# Patient Record
Sex: MALE | Race: WHITE | NOT HISPANIC OR LATINO | Employment: UNEMPLOYED | URBAN - METROPOLITAN AREA
[De-identification: names, ages, dates, MRNs, and addresses within clinical notes are randomized per-mention and may not be internally consistent; named-entity substitution may affect disease eponyms.]

---

## 2023-12-28 ENCOUNTER — HOSPITAL ENCOUNTER (INPATIENT)
Age: 73
Discharge: HOME | DRG: 384 | End: 2023-12-28
Attending: STUDENT IN AN ORGANIZED HEALTH CARE EDUCATION/TRAINING PROGRAM | Admitting: FAMILY MEDICINE

## 2023-12-28 DIAGNOSIS — K25.3 ACUTE GASTRIC ULCER, UNSPECIFIED WHETHER GASTRIC ULCER HEMORRHAGE OR PERFORATION PRESENT: Primary | ICD-10-CM

## 2023-12-28 DIAGNOSIS — N17.9 ACUTE KIDNEY INJURY (CMD): ICD-10-CM

## 2023-12-28 DIAGNOSIS — R33.8 ACUTE URINARY RETENTION: ICD-10-CM

## 2023-12-28 DIAGNOSIS — D64.9 ANEMIA, UNSPECIFIED TYPE: ICD-10-CM

## 2023-12-28 PROCEDURE — 96375 TX/PRO/DX INJ NEW DRUG ADDON: CPT

## 2023-12-28 PROCEDURE — 10002800 HB RX 250 W HCPCS: Performed by: STUDENT IN AN ORGANIZED HEALTH CARE EDUCATION/TRAINING PROGRAM

## 2023-12-28 PROCEDURE — 82272 OCCULT BLD FECES 1-3 TESTS: CPT

## 2023-12-28 PROCEDURE — 86850 RBC ANTIBODY SCREEN: CPT | Performed by: STUDENT IN AN ORGANIZED HEALTH CARE EDUCATION/TRAINING PROGRAM

## 2023-12-28 PROCEDURE — 10002807 HB RX 258: Performed by: STUDENT IN AN ORGANIZED HEALTH CARE EDUCATION/TRAINING PROGRAM

## 2023-12-28 PROCEDURE — 83690 ASSAY OF LIPASE: CPT | Performed by: STUDENT IN AN ORGANIZED HEALTH CARE EDUCATION/TRAINING PROGRAM

## 2023-12-28 PROCEDURE — 85025 COMPLETE CBC W/AUTO DIFF WBC: CPT | Performed by: STUDENT IN AN ORGANIZED HEALTH CARE EDUCATION/TRAINING PROGRAM

## 2023-12-28 PROCEDURE — 99285 EMERGENCY DEPT VISIT HI MDM: CPT

## 2023-12-28 PROCEDURE — 96374 THER/PROPH/DIAG INJ IV PUSH: CPT

## 2023-12-28 PROCEDURE — 80053 COMPREHEN METABOLIC PANEL: CPT | Performed by: STUDENT IN AN ORGANIZED HEALTH CARE EDUCATION/TRAINING PROGRAM

## 2023-12-28 PROCEDURE — 96361 HYDRATE IV INFUSION ADD-ON: CPT

## 2023-12-28 RX ORDER — ONDANSETRON 2 MG/ML
4 INJECTION INTRAMUSCULAR; INTRAVENOUS ONCE
Status: COMPLETED | OUTPATIENT
Start: 2023-12-29 | End: 2023-12-29

## 2023-12-28 RX ADMIN — ONDANSETRON 4 MG: 2 INJECTION INTRAMUSCULAR; INTRAVENOUS at 23:55

## 2023-12-28 RX ADMIN — SODIUM CHLORIDE 1000 ML: 9 INJECTION, SOLUTION INTRAVENOUS at 23:55

## 2023-12-29 ENCOUNTER — APPOINTMENT (OUTPATIENT)
Dept: CARDIOLOGY | Age: 73
DRG: 384 | End: 2023-12-29
Attending: INTERNAL MEDICINE

## 2023-12-29 ENCOUNTER — APPOINTMENT (OUTPATIENT)
Dept: GENERAL RADIOLOGY | Age: 73
DRG: 384 | End: 2023-12-29
Attending: SURGERY

## 2023-12-29 ENCOUNTER — APPOINTMENT (OUTPATIENT)
Dept: CT IMAGING | Age: 73
DRG: 384 | End: 2023-12-29
Attending: STUDENT IN AN ORGANIZED HEALTH CARE EDUCATION/TRAINING PROGRAM

## 2023-12-29 PROBLEM — K25.3 ACUTE GASTRIC ULCER, UNSPECIFIED WHETHER GASTRIC ULCER HEMORRHAGE OR PERFORATION PRESENT: Status: ACTIVE | Noted: 2023-12-29

## 2023-12-29 PROBLEM — K56.609 SBO (SMALL BOWEL OBSTRUCTION) (CMD): Status: ACTIVE | Noted: 2023-12-29

## 2023-12-29 LAB
ABO + RH BLD: NORMAL
ALBUMIN SERPL-MCNC: 2.8 G/DL (ref 3.6–5.1)
ALBUMIN/GLOB SERPL: 0.6 {RATIO} (ref 1–2.4)
ALP SERPL-CCNC: 115 UNITS/L (ref 45–117)
ALT SERPL-CCNC: 26 UNITS/L
ANION GAP SERPL CALC-SCNC: 10 MMOL/L (ref 7–19)
ANION GAP SERPL CALC-SCNC: 11 MMOL/L (ref 7–19)
APPEARANCE UR: CLEAR
AST SERPL-CCNC: 19 UNITS/L
ATRIAL RATE (BPM): 254
BACTERIA #/AREA URNS HPF: ABNORMAL /HPF
BASOPHILS # BLD: 0 K/MCL (ref 0–0.3)
BASOPHILS # BLD: 0.1 K/MCL (ref 0–0.3)
BASOPHILS NFR BLD: 0 %
BASOPHILS NFR BLD: 0 %
BILIRUB SERPL-MCNC: 0.3 MG/DL (ref 0.2–1)
BILIRUB UR QL STRIP: NEGATIVE
BLD GP AB SCN SERPL QL GEL: NEGATIVE
BUN SERPL-MCNC: 40 MG/DL (ref 6–20)
BUN SERPL-MCNC: 44 MG/DL (ref 6–20)
BUN/CREAT SERPL: 26 (ref 7–25)
BUN/CREAT SERPL: 26 (ref 7–25)
CALCIUM SERPL-MCNC: 8.7 MG/DL (ref 8.4–10.2)
CALCIUM SERPL-MCNC: 8.9 MG/DL (ref 8.4–10.2)
CHLORIDE SERPL-SCNC: 112 MMOL/L (ref 97–110)
CHLORIDE SERPL-SCNC: 115 MMOL/L (ref 97–110)
CO2 SERPL-SCNC: 22 MMOL/L (ref 21–32)
CO2 SERPL-SCNC: 23 MMOL/L (ref 21–32)
COLOR UR: ABNORMAL
CREAT SERPL-MCNC: 1.52 MG/DL (ref 0.67–1.17)
CREAT SERPL-MCNC: 1.67 MG/DL (ref 0.67–1.17)
DEPRECATED RDW RBC: 40.8 FL (ref 39–50)
DEPRECATED RDW RBC: 41 FL (ref 39–50)
EGFRCR SERPLBLD CKD-EPI 2021: 43 ML/MIN/{1.73_M2}
EGFRCR SERPLBLD CKD-EPI 2021: 48 ML/MIN/{1.73_M2}
EOSINOPHIL # BLD: 0.2 K/MCL (ref 0–0.5)
EOSINOPHIL # BLD: 0.3 K/MCL (ref 0–0.5)
EOSINOPHIL NFR BLD: 2 %
EOSINOPHIL NFR BLD: 2 %
ERYTHROCYTE [DISTWIDTH] IN BLOOD: 13.6 % (ref 11–15)
ERYTHROCYTE [DISTWIDTH] IN BLOOD: 13.7 % (ref 11–15)
FASTING DURATION TIME PATIENT: ABNORMAL H
FASTING DURATION TIME PATIENT: ABNORMAL H
GLOBULIN SER-MCNC: 4.9 G/DL (ref 2–4)
GLUCOSE BLDC GLUCOMTR-MCNC: 76 MG/DL (ref 70–99)
GLUCOSE SERPL-MCNC: 110 MG/DL (ref 70–99)
GLUCOSE SERPL-MCNC: 112 MG/DL (ref 70–99)
GLUCOSE UR STRIP-MCNC: NEGATIVE MG/DL
HCT VFR BLD CALC: 25.1 % (ref 39–51)
HCT VFR BLD CALC: 25.7 % (ref 39–51)
HGB BLD-MCNC: 7.7 G/DL (ref 13–17)
HGB BLD-MCNC: 7.9 G/DL (ref 13–17)
HGB UR QL STRIP: NEGATIVE
HYALINE CASTS #/AREA URNS LPF: ABNORMAL /LPF
IMM GRANULOCYTES # BLD AUTO: 0 K/MCL (ref 0–0.2)
IMM GRANULOCYTES # BLD AUTO: 0 K/MCL (ref 0–0.2)
IMM GRANULOCYTES # BLD: 0 %
IMM GRANULOCYTES # BLD: 0 %
KETONES UR STRIP-MCNC: NEGATIVE MG/DL
LACTATE BLDV-SCNC: 0.7 MMOL/L (ref 0–2)
LEUKOCYTE ESTERASE UR QL STRIP: ABNORMAL
LIPASE SERPL-CCNC: 110 UNITS/L (ref 15–77)
LYMPHOCYTES # BLD: 1.3 K/MCL (ref 1–4)
LYMPHOCYTES # BLD: 1.3 K/MCL (ref 1–4)
LYMPHOCYTES NFR BLD: 10 %
LYMPHOCYTES NFR BLD: 13 %
MAGNESIUM SERPL-MCNC: 2.1 MG/DL (ref 1.7–2.4)
MCH RBC QN AUTO: 25 PG (ref 26–34)
MCH RBC QN AUTO: 25.5 PG (ref 26–34)
MCHC RBC AUTO-ENTMCNC: 30.7 G/DL (ref 32–36.5)
MCHC RBC AUTO-ENTMCNC: 30.7 G/DL (ref 32–36.5)
MCV RBC AUTO: 81.5 FL (ref 78–100)
MCV RBC AUTO: 82.9 FL (ref 78–100)
MONOCYTES # BLD: 0.8 K/MCL (ref 0.3–0.9)
MONOCYTES # BLD: 0.9 K/MCL (ref 0.3–0.9)
MONOCYTES NFR BLD: 7 %
MONOCYTES NFR BLD: 8 %
MUCOUS THREADS URNS QL MICRO: PRESENT
NEUTROPHILS # BLD: 10.5 K/MCL (ref 1.8–7.7)
NEUTROPHILS # BLD: 7.5 K/MCL (ref 1.8–7.7)
NEUTROPHILS NFR BLD: 77 %
NEUTROPHILS NFR BLD: 81 %
NITRITE UR QL STRIP: NEGATIVE
NRBC BLD MANUAL-RTO: 0 /100 WBC
NRBC BLD MANUAL-RTO: 0 /100 WBC
PH UR STRIP: 5 [PH] (ref 5–7)
PHOSPHATE SERPL-MCNC: 3.9 MG/DL (ref 2.4–4.7)
PLATELET # BLD AUTO: 360 K/MCL (ref 140–450)
PLATELET # BLD AUTO: 425 K/MCL (ref 140–450)
POTASSIUM SERPL-SCNC: 4.4 MMOL/L (ref 3.4–5.1)
POTASSIUM SERPL-SCNC: 4.6 MMOL/L (ref 3.4–5.1)
PROT SERPL-MCNC: 7.7 G/DL (ref 6.4–8.2)
PROT UR STRIP-MCNC: NEGATIVE MG/DL
QRS-INTERVAL (MSEC): 104
QT-INTERVAL (MSEC): 316
QTC: 459
R AXIS (DEGREES): 86
RAINBOW EXTRA TUBES HOLD SPECIMEN: NORMAL
RBC # BLD: 3.08 MIL/MCL (ref 4.5–5.9)
RBC # BLD: 3.1 MIL/MCL (ref 4.5–5.9)
RBC #/AREA URNS HPF: ABNORMAL /HPF
REPORT TEXT: NORMAL
SODIUM SERPL-SCNC: 140 MMOL/L (ref 135–145)
SODIUM SERPL-SCNC: 144 MMOL/L (ref 135–145)
SP GR UR STRIP: 1.01 (ref 1–1.03)
SQUAMOUS #/AREA URNS HPF: ABNORMAL /HPF
T AXIS (DEGREES): -31
TRIGL SERPL-MCNC: 69 MG/DL
TYPE AND SCREEN EXPIRATION DATE: NORMAL
UROBILINOGEN UR STRIP-MCNC: 0.2 MG/DL
VENTRICULAR RATE EKG/MIN (BPM): 127
WBC # BLD: 13.1 K/MCL (ref 4.2–11)
WBC # BLD: 9.9 K/MCL (ref 4.2–11)
WBC #/AREA URNS HPF: ABNORMAL /HPF

## 2023-12-29 PROCEDURE — 10002801 HB RX 250 W/O HCPCS: Performed by: STUDENT IN AN ORGANIZED HEALTH CARE EDUCATION/TRAINING PROGRAM

## 2023-12-29 PROCEDURE — 93005 ELECTROCARDIOGRAM TRACING: CPT | Performed by: STUDENT IN AN ORGANIZED HEALTH CARE EDUCATION/TRAINING PROGRAM

## 2023-12-29 PROCEDURE — 87086 URINE CULTURE/COLONY COUNT: CPT | Performed by: STUDENT IN AN ORGANIZED HEALTH CARE EDUCATION/TRAINING PROGRAM

## 2023-12-29 PROCEDURE — 84100 ASSAY OF PHOSPHORUS: CPT

## 2023-12-29 PROCEDURE — 10004651 HB RX, NO CHARGE ITEM: Performed by: FAMILY MEDICINE

## 2023-12-29 PROCEDURE — 83735 ASSAY OF MAGNESIUM: CPT | Performed by: FAMILY MEDICINE

## 2023-12-29 PROCEDURE — 80048 BASIC METABOLIC PNL TOTAL CA: CPT | Performed by: FAMILY MEDICINE

## 2023-12-29 PROCEDURE — 81001 URINALYSIS AUTO W/SCOPE: CPT | Performed by: STUDENT IN AN ORGANIZED HEALTH CARE EDUCATION/TRAINING PROGRAM

## 2023-12-29 PROCEDURE — 10006031 HB ROOM CHARGE TELEMETRY

## 2023-12-29 PROCEDURE — 87040 BLOOD CULTURE FOR BACTERIA: CPT | Performed by: STUDENT IN AN ORGANIZED HEALTH CARE EDUCATION/TRAINING PROGRAM

## 2023-12-29 PROCEDURE — 84478 ASSAY OF TRIGLYCERIDES: CPT

## 2023-12-29 PROCEDURE — 10002800 HB RX 250 W HCPCS

## 2023-12-29 PROCEDURE — 83605 ASSAY OF LACTIC ACID: CPT | Performed by: STUDENT IN AN ORGANIZED HEALTH CARE EDUCATION/TRAINING PROGRAM

## 2023-12-29 PROCEDURE — 99223 1ST HOSP IP/OBS HIGH 75: CPT | Performed by: SURGERY

## 2023-12-29 PROCEDURE — 10004281 HB COUNTER-STAFF TIME PER 15 MIN

## 2023-12-29 PROCEDURE — 10002805 HB CONTRAST AGENT: Performed by: STUDENT IN AN ORGANIZED HEALTH CARE EDUCATION/TRAINING PROGRAM

## 2023-12-29 PROCEDURE — 3E0336Z INTRODUCTION OF NUTRITIONAL SUBSTANCE INTO PERIPHERAL VEIN, PERCUTANEOUS APPROACH: ICD-10-PCS | Performed by: SURGERY

## 2023-12-29 PROCEDURE — C9113 INJ PANTOPRAZOLE SODIUM, VIA: HCPCS

## 2023-12-29 PROCEDURE — 74177 CT ABD & PELVIS W/CONTRAST: CPT

## 2023-12-29 PROCEDURE — 85025 COMPLETE CBC W/AUTO DIFF WBC: CPT | Performed by: FAMILY MEDICINE

## 2023-12-29 PROCEDURE — 10002800 HB RX 250 W HCPCS: Performed by: STUDENT IN AN ORGANIZED HEALTH CARE EDUCATION/TRAINING PROGRAM

## 2023-12-29 PROCEDURE — 99232 SBSQ HOSP IP/OBS MODERATE 35: CPT | Performed by: STUDENT IN AN ORGANIZED HEALTH CARE EDUCATION/TRAINING PROGRAM

## 2023-12-29 PROCEDURE — 10002801 HB RX 250 W/O HCPCS

## 2023-12-29 PROCEDURE — 10002807 HB RX 258: Performed by: FAMILY MEDICINE

## 2023-12-29 PROCEDURE — 0D9670Z DRAINAGE OF STOMACH WITH DRAINAGE DEVICE, VIA NATURAL OR ARTIFICIAL OPENING: ICD-10-PCS | Performed by: SURGERY

## 2023-12-29 PROCEDURE — 10002807 HB RX 258

## 2023-12-29 PROCEDURE — 99223 1ST HOSP IP/OBS HIGH 75: CPT | Performed by: FAMILY MEDICINE

## 2023-12-29 PROCEDURE — 10002801 HB RX 250 W/O HCPCS: Performed by: FAMILY MEDICINE

## 2023-12-29 PROCEDURE — 10002800 HB RX 250 W HCPCS: Performed by: FAMILY MEDICINE

## 2023-12-29 PROCEDURE — 71045 X-RAY EXAM CHEST 1 VIEW: CPT

## 2023-12-29 PROCEDURE — 10002807 HB RX 258: Performed by: STUDENT IN AN ORGANIZED HEALTH CARE EDUCATION/TRAINING PROGRAM

## 2023-12-29 RX ORDER — ONDANSETRON 4 MG/1
4 TABLET, ORALLY DISINTEGRATING ORAL EVERY 12 HOURS PRN
Status: DISCONTINUED | OUTPATIENT
Start: 2023-12-29 | End: 2024-01-05 | Stop reason: HOSPADM

## 2023-12-29 RX ORDER — BISACODYL 10 MG
10 SUPPOSITORY, RECTAL RECTAL DAILY PRN
Status: DISCONTINUED | OUTPATIENT
Start: 2023-12-29 | End: 2024-01-05 | Stop reason: HOSPADM

## 2023-12-29 RX ORDER — CEFAZOLIN SODIUM/WATER 2 G/20 ML
2000 SYRINGE (ML) INTRAVENOUS ONCE
Status: COMPLETED | OUTPATIENT
Start: 2023-12-29 | End: 2023-12-29

## 2023-12-29 RX ORDER — POLYETHYLENE GLYCOL 3350 17 G/17G
17 POWDER, FOR SOLUTION ORAL DAILY PRN
Status: DISCONTINUED | OUTPATIENT
Start: 2023-12-29 | End: 2024-01-05 | Stop reason: HOSPADM

## 2023-12-29 RX ORDER — ACETAMINOPHEN 325 MG/1
650 TABLET ORAL EVERY 4 HOURS PRN
Status: DISCONTINUED | OUTPATIENT
Start: 2023-12-29 | End: 2024-01-05 | Stop reason: HOSPADM

## 2023-12-29 RX ORDER — ONDANSETRON 2 MG/ML
4 INJECTION INTRAMUSCULAR; INTRAVENOUS EVERY 6 HOURS PRN
Status: DISCONTINUED | OUTPATIENT
Start: 2023-12-29 | End: 2024-01-05 | Stop reason: HOSPADM

## 2023-12-29 RX ORDER — METRONIDAZOLE 500 MG/1
500 TABLET ORAL ONCE
Status: DISCONTINUED | OUTPATIENT
Start: 2023-12-29 | End: 2023-12-29

## 2023-12-29 RX ORDER — CEFAZOLIN SODIUM/WATER 2 G/20 ML
2000 SYRINGE (ML) INTRAVENOUS NIGHTLY
Status: COMPLETED | OUTPATIENT
Start: 2023-12-29 | End: 2024-01-04

## 2023-12-29 RX ORDER — 0.9 % SODIUM CHLORIDE 0.9 %
2 VIAL (ML) INJECTION EVERY 12 HOURS SCHEDULED
Status: DISCONTINUED | OUTPATIENT
Start: 2023-12-29 | End: 2024-01-05 | Stop reason: HOSPADM

## 2023-12-29 RX ORDER — AMOXICILLIN 250 MG
2 CAPSULE ORAL 2 TIMES DAILY PRN
Status: DISCONTINUED | OUTPATIENT
Start: 2023-12-29 | End: 2024-01-05 | Stop reason: HOSPADM

## 2023-12-29 RX ORDER — SODIUM CHLORIDE, SODIUM LACTATE, POTASSIUM CHLORIDE, CALCIUM CHLORIDE 600; 310; 30; 20 MG/100ML; MG/100ML; MG/100ML; MG/100ML
INJECTION, SOLUTION INTRAVENOUS CONTINUOUS
Status: ACTIVE | OUTPATIENT
Start: 2023-12-29 | End: 2023-12-29

## 2023-12-29 RX ORDER — ACETAMINOPHEN 650 MG/1
650 SUPPOSITORY RECTAL EVERY 4 HOURS PRN
Status: DISCONTINUED | OUTPATIENT
Start: 2023-12-29 | End: 2024-01-05 | Stop reason: HOSPADM

## 2023-12-29 RX ORDER — LIDOCAINE HYDROCHLORIDE 20 MG/ML
10 JELLY TOPICAL
Status: DISCONTINUED | OUTPATIENT
Start: 2023-12-29 | End: 2024-01-05 | Stop reason: HOSPADM

## 2023-12-29 RX ORDER — PANTOPRAZOLE SODIUM 40 MG/10ML
40 INJECTION, POWDER, LYOPHILIZED, FOR SOLUTION INTRAVENOUS EVERY 12 HOURS SCHEDULED
Status: DISCONTINUED | OUTPATIENT
Start: 2023-12-29 | End: 2024-01-05 | Stop reason: HOSPADM

## 2023-12-29 RX ORDER — ENOXAPARIN SODIUM 100 MG/ML
40 INJECTION SUBCUTANEOUS DAILY
Status: DISCONTINUED | OUTPATIENT
Start: 2023-12-29 | End: 2024-01-05 | Stop reason: HOSPADM

## 2023-12-29 RX ORDER — DEXTROSE MONOHYDRATE 100 MG/ML
1000 INJECTION, SOLUTION INTRAVENOUS CONTINUOUS PRN
Status: DISCONTINUED | OUTPATIENT
Start: 2023-12-29 | End: 2024-01-05

## 2023-12-29 RX ORDER — LANOLIN ALCOHOL/MO/W.PET/CERES
3 CREAM (GRAM) TOPICAL NIGHTLY PRN
Status: DISCONTINUED | OUTPATIENT
Start: 2023-12-29 | End: 2024-01-05 | Stop reason: HOSPADM

## 2023-12-29 RX ADMIN — SODIUM CHLORIDE, PRESERVATIVE FREE 2 ML: 5 INJECTION INTRAVENOUS at 20:42

## 2023-12-29 RX ADMIN — PANTOPRAZOLE SODIUM 40 MG: 40 INJECTION, POWDER, FOR SOLUTION INTRAVENOUS at 20:36

## 2023-12-29 RX ADMIN — SODIUM CHLORIDE, POTASSIUM CHLORIDE, SODIUM LACTATE AND CALCIUM CHLORIDE: 600; 310; 30; 20 INJECTION, SOLUTION INTRAVENOUS at 14:51

## 2023-12-29 RX ADMIN — IOHEXOL 75 ML: 350 INJECTION, SOLUTION INTRAVENOUS at 01:26

## 2023-12-29 RX ADMIN — METRONIDAZOLE 500 MG: 500 INJECTION, SOLUTION INTRAVENOUS at 02:35

## 2023-12-29 RX ADMIN — MORPHINE SULFATE 2 MG: 2 INJECTION, SOLUTION INTRAMUSCULAR; INTRAVENOUS at 07:25

## 2023-12-29 RX ADMIN — MORPHINE SULFATE 4 MG: 4 INJECTION INTRAVENOUS at 11:25

## 2023-12-29 RX ADMIN — MORPHINE SULFATE 4 MG: 4 INJECTION INTRAVENOUS at 00:05

## 2023-12-29 RX ADMIN — CEFTRIAXONE SODIUM 2000 MG: 10 INJECTION, POWDER, FOR SOLUTION INTRAVENOUS at 20:37

## 2023-12-29 RX ADMIN — METRONIDAZOLE 500 MG: 500 INJECTION, SOLUTION INTRAVENOUS at 20:40

## 2023-12-29 RX ADMIN — METRONIDAZOLE 500 MG: 500 INJECTION, SOLUTION INTRAVENOUS at 11:39

## 2023-12-29 RX ADMIN — SODIUM CHLORIDE, PRESERVATIVE FREE 2 ML: 5 INJECTION INTRAVENOUS at 09:00

## 2023-12-29 RX ADMIN — CEFTRIAXONE SODIUM 2000 MG: 10 INJECTION, POWDER, FOR SOLUTION INTRAVENOUS at 02:33

## 2023-12-29 RX ADMIN — PANTOPRAZOLE SODIUM 40 MG: 40 INJECTION, POWDER, FOR SOLUTION INTRAVENOUS at 08:58

## 2023-12-29 RX ADMIN — SODIUM CHLORIDE 1000 ML: 9 INJECTION, SOLUTION INTRAVENOUS at 01:50

## 2023-12-29 RX ADMIN — SODIUM CHLORIDE, POTASSIUM CHLORIDE, SODIUM LACTATE AND CALCIUM CHLORIDE: 600; 310; 30; 20 INJECTION, SOLUTION INTRAVENOUS at 03:46

## 2023-12-29 RX ADMIN — ASCORBIC ACID, VITAMIN A PALMITATE, CHOLECALCIFEROL, THIAMINE HYDROCHLORIDE, RIBOFLAVIN-5 PHOSPHATE SODIUM, PYRIDOXINE HYDROCHLORIDE, NIACINAMIDE, DEXPANTHENOL, ALPHA-TOCOPHEROL ACETATE, VITAMIN K1, FOLIC ACID, BIOTIN, CYANOCOBALAMIN: 200; 3300; 200; 6; 3.6; 6; 40; 15; 10; 150; 600; 60; 5 INJECTION, SOLUTION INTRAVENOUS at 22:45

## 2023-12-29 RX ADMIN — LIDOCAINE HYDROCHLORIDE 10 ML: 20 JELLY TOPICAL at 02:30

## 2023-12-29 RX ADMIN — Medication 15 ML: at 00:06

## 2023-12-29 RX ADMIN — Medication 10 MG/HR: at 05:31

## 2023-12-29 SDOH — HEALTH STABILITY: PHYSICAL HEALTH: DO YOU HAVE SERIOUS DIFFICULTY WALKING OR CLIMBING STAIRS?: NO

## 2023-12-29 SDOH — ECONOMIC STABILITY: HOUSING INSECURITY: WHAT IS YOUR LIVING SITUATION TODAY?: I HAVE A STEADY PLACE TO LIVE

## 2023-12-29 SDOH — HEALTH STABILITY: GENERAL
BECAUSE OF A PHYSICAL, MENTAL, OR EMOTIONAL CONDITION, DO YOU HAVE SERIOUS DIFFICULTY CONCENTRATING, REMEMBERING OR MAKING DECISIONS?: NO

## 2023-12-29 SDOH — HEALTH STABILITY: PHYSICAL HEALTH: DO YOU HAVE DIFFICULTY DRESSING OR BATHING?: NO

## 2023-12-29 SDOH — ECONOMIC STABILITY: FOOD INSECURITY: WITHIN THE PAST 12 MONTHS, THE FOOD YOU BOUGHT JUST DIDN'T LAST AND YOU DIDN'T HAVE MONEY TO GET MORE.: SOMETIMES TRUE

## 2023-12-29 SDOH — ECONOMIC STABILITY: TRANSPORTATION INSECURITY
IN THE PAST 12 MONTHS, HAS LACK OF RELIABLE TRANSPORTATION KEPT YOU FROM MEDICAL APPOINTMENTS, MEETINGS, WORK OR FROM GETTING THINGS NEEDED FOR DAILY LIVING?: NO

## 2023-12-29 SDOH — SOCIAL STABILITY: SOCIAL INSECURITY: HOW OFTEN DOES ANYONE, INCLUDING FAMILY AND FRIENDS, SCREAM OR CURSE AT YOU?: NEVER

## 2023-12-29 SDOH — SOCIAL STABILITY: SOCIAL NETWORK
HOW OFTEN DO YOU SEE OR TALK TO PEOPLE THAT YOU CARE ABOUT AND FEEL CLOSE TO? (FOR EXAMPLE: TALKING TO FRIENDS ON THE PHONE, VISITING FRIENDS OR FAMILY, GOING TO CHURCH OR CLUB MEETINGS): 1 OR 2 TIMES A WEEK

## 2023-12-29 SDOH — ECONOMIC STABILITY: HOUSING INSECURITY: WHAT IS YOUR LIVING SITUATION TODAY?: SPOUSE

## 2023-12-29 SDOH — ECONOMIC STABILITY: HOUSING INSECURITY: DO YOU HAVE PROBLEMS WITH ANY OF THE FOLLOWING?: NONE OF THE ABOVE

## 2023-12-29 SDOH — ECONOMIC STABILITY: GENERAL

## 2023-12-29 SDOH — ECONOMIC STABILITY: INCOME INSECURITY: IN THE PAST 12 MONTHS, HAS THE ELECTRIC, GAS, OIL, OR WATER COMPANY THREATENED TO SHUT OFF SERVICE IN YOUR HOME?: NO

## 2023-12-29 SDOH — HEALTH STABILITY: GENERAL: BECAUSE OF A PHYSICAL, MENTAL, OR EMOTIONAL CONDITION, DO YOU HAVE DIFFICULTY DOING ERRANDS ALONE?: NO

## 2023-12-29 SDOH — SOCIAL STABILITY: SOCIAL INSECURITY: HOW OFTEN DOES ANYONE, INCLUDING FAMILY AND FRIENDS, THREATEN YOU WITH HARM?: NEVER

## 2023-12-29 SDOH — ECONOMIC STABILITY: GENERAL: WOULD YOU LIKE HELP WITH ANY OF THE FOLLOWING NEEDS?: I DON'T WANT HELP WITH ANY OF THESE

## 2023-12-29 SDOH — ECONOMIC STABILITY: HOUSING INSECURITY: WHAT IS YOUR LIVING SITUATION TODAY?: APARTMENT

## 2023-12-29 SDOH — SOCIAL STABILITY: SOCIAL INSECURITY: HOW OFTEN DOES ANYONE, INCLUDING FAMILY AND FRIENDS, PHYSICALLY HURT YOU?: NEVER

## 2023-12-29 SDOH — SOCIAL STABILITY: SOCIAL INSECURITY: HOW OFTEN DOES ANYONE, INCLUDING FAMILY AND FRIENDS, INSULT OR TALK DOWN TO YOU?: NEVER

## 2023-12-29 SDOH — SOCIAL STABILITY: SOCIAL NETWORK: SUPPORT SYSTEMS: CHILDREN;FAMILY MEMBERS;SPOUSE

## 2023-12-29 ASSESSMENT — PAIN SCALES - GENERAL
PAINLEVEL_OUTOF10: 5
PAINLEVEL_OUTOF10: 8
PAINLEVEL_OUTOF10: 8
PAINLEVEL_OUTOF10: 0
PAINLEVEL_OUTOF10: 6
PAINLEVEL_OUTOF10: 7

## 2023-12-29 ASSESSMENT — LIFESTYLE VARIABLES
HOW OFTEN DO YOU HAVE A DRINK CONTAINING ALCOHOL: MONTHLY OR LESS
HOW OFTEN DO YOU HAVE 6 OR MORE DRINKS ON ONE OCCASION: NEVER
HOW MANY STANDARD DRINKS CONTAINING ALCOHOL DO YOU HAVE ON A TYPICAL DAY: 0,1 OR 2
ALCOHOL_USE_STATUS: NO OR LOW RISK WITH VALIDATED TOOL
HOW OFTEN DO YOU HAVE A DRINK CONTAINING ALCOHOL: NEVER
HOW OFTEN DO YOU HAVE 6 OR MORE DRINKS ON ONE OCCASION: NEVER
HOW OFTEN DO YOU HAVE A DRINK CONTAINING ALCOHOL: MONTHLY OR LESS
HOW MANY STANDARD DRINKS CONTAINING ALCOHOL DO YOU HAVE ON A TYPICAL DAY: 0,1 OR 2
HOW MANY STANDARD DRINKS CONTAINING ALCOHOL DO YOU HAVE ON A TYPICAL DAY: 0,1 OR 2
AUDIT-C TOTAL SCORE: 1
ALCOHOL_USE_STATUS: NO OR LOW RISK WITH VALIDATED TOOL
HOW OFTEN DO YOU HAVE 6 OR MORE DRINKS ON ONE OCCASION: NEVER
AUDIT-C TOTAL SCORE: 1
ALCOHOL_USE_STATUS: NO OR LOW RISK WITH VALIDATED TOOL
AUDIT-C TOTAL SCORE: 0

## 2023-12-29 ASSESSMENT — ACTIVITIES OF DAILY LIVING (ADL)
RECENT_DECLINE_ADL: NO
ADL_SHORT_OF_BREATH: NO
ADL_SHORT_OF_BREATH: NO
ADL_SCORE: 12
ADL_BEFORE_ADMISSION: INDEPENDENT
RECENT_DECLINE_ADL: NO
ADL_SCORE: 12
ADL_BEFORE_ADMISSION: INDEPENDENT

## 2023-12-29 ASSESSMENT — COLUMBIA-SUICIDE SEVERITY RATING SCALE - C-SSRS
IS THE PATIENT ABLE TO COMPLETE C-SSRS: YES
1. WITHIN THE PAST MONTH, HAVE YOU WISHED YOU WERE DEAD OR WISHED YOU COULD GO TO SLEEP AND NOT WAKE UP?: NO
6. HAVE YOU EVER DONE ANYTHING, STARTED TO DO ANYTHING, OR PREPARED TO DO ANYTHING TO END YOUR LIFE?: NO
2. HAVE YOU ACTUALLY HAD ANY THOUGHTS OF KILLING YOURSELF?: NO

## 2023-12-29 ASSESSMENT — ORIENTATION MEMORY CONCENTRATION TEST (OMCT)
COUNT BACKWARDS FROM 20 TO 1: CORRECT
OMCT SCORE: 0
OMCT INTERPRETATION: 0-6: NO SIGNIFICANT IMPAIRMENT
REPEAT THE NAME AND ADDRESS I ASKED YOU TO REMEMBER: CORRECT
SAY THE MONTHS IN REVERSE ORDER STARTING WITH LAST MONTH: CORRECT
WHAT TIME IS IT (NO WATCH OR CLOCK): CORRECT
WHAT MONTH IS IT NOW: CORRECT
WHAT YEAR IS IT NOW (MUST BE EXACT): CORRECT

## 2023-12-29 ASSESSMENT — PATIENT HEALTH QUESTIONNAIRE - PHQ9
1. LITTLE INTEREST OR PLEASURE IN DOING THINGS: NOT AT ALL
IS PATIENT ABLE TO COMPLETE PHQ2 OR PHQ9: YES
SUM OF ALL RESPONSES TO PHQ9 QUESTIONS 1 AND 2: 0
2. FEELING DOWN, DEPRESSED OR HOPELESS: NOT AT ALL
CLINICAL INTERPRETATION OF PHQ2 SCORE: NO FURTHER SCREENING NEEDED
SUM OF ALL RESPONSES TO PHQ9 QUESTIONS 1 AND 2: 0

## 2023-12-29 ASSESSMENT — ENCOUNTER SYMPTOMS
NAUSEA: 1
ABDOMINAL PAIN: 1

## 2023-12-29 ASSESSMENT — COGNITIVE AND FUNCTIONAL STATUS - GENERAL
BECAUSE OF A PHYSICAL, MENTAL, OR EMOTIONAL CONDITION, DO YOU HAVE SERIOUS DIFFICULTY CONCENTRATING, REMEMBERING OR MAKING DECISIONS: NO
DO YOU HAVE SERIOUS DIFFICULTY WALKING OR CLIMBING STAIRS: NO
BECAUSE OF A PHYSICAL, MENTAL, OR EMOTIONAL CONDITION, DO YOU HAVE DIFFICULTY DOING ERRANDS ALONE: NO

## 2023-12-30 ENCOUNTER — APPOINTMENT (OUTPATIENT)
Dept: CARDIOLOGY | Age: 73
DRG: 384 | End: 2023-12-30
Attending: INTERNAL MEDICINE

## 2023-12-30 VITALS
DIASTOLIC BLOOD PRESSURE: 70 MMHG | TEMPERATURE: 98.6 F | SYSTOLIC BLOOD PRESSURE: 145 MMHG | OXYGEN SATURATION: 95 % | BODY MASS INDEX: 22.25 KG/M2 | HEART RATE: 78 BPM | HEIGHT: 72 IN | RESPIRATION RATE: 16 BRPM | WEIGHT: 164.24 LBS

## 2023-12-30 LAB
ANION GAP SERPL CALC-SCNC: 10 MMOL/L (ref 7–19)
AORTIC VALVE AREA (AVA): 0.55
AORTIC VALVE AREA: 3.22
AV MEAN GRADIENT (AVMG): 4
AV MEAN VELOCITY (AVMV): 0.98
AV PEAK GRADIENT (AVPG): 9
AV PEAK VELOCITY (AVPV): 1.51
AV STENOSIS SEVERITY TEXT: NORMAL
AVI LVOT PEAK GRADIENT (LVOTMG): 0.9
BACTERIA BLD CULT: NORMAL
BACTERIA BLD CULT: NORMAL
BACTERIA UR CULT: NO GROWTH
BUN SERPL-MCNC: 32 MG/DL (ref 6–20)
BUN/CREAT SERPL: 23 (ref 7–25)
CALCIUM SERPL-MCNC: 8.9 MG/DL (ref 8.4–10.2)
CHLORIDE SERPL-SCNC: 112 MMOL/L (ref 97–110)
CO2 SERPL-SCNC: 25 MMOL/L (ref 21–32)
CREAT SERPL-MCNC: 1.4 MG/DL (ref 0.67–1.17)
DEPRECATED RDW RBC: 41.2 FL (ref 39–50)
E WAVE DECELARATION TIME (MDT): 10.28
EGFRCR SERPLBLD CKD-EPI 2021: 53 ML/MIN/{1.73_M2}
ERYTHROCYTE [DISTWIDTH] IN BLOOD: 13.5 % (ref 11–15)
FASTING DURATION TIME PATIENT: ABNORMAL H
GLUCOSE BLDC GLUCOMTR-MCNC: 138 MG/DL (ref 70–99)
GLUCOSE BLDC GLUCOMTR-MCNC: 150 MG/DL (ref 70–99)
GLUCOSE BLDC GLUCOMTR-MCNC: 155 MG/DL (ref 70–99)
GLUCOSE BLDC GLUCOMTR-MCNC: 160 MG/DL (ref 70–99)
GLUCOSE SERPL-MCNC: 137 MG/DL (ref 70–99)
HCT VFR BLD CALC: 25.5 % (ref 39–51)
HGB BLD-MCNC: 7.8 G/DL (ref 13–17)
INTERVENTRICULAR SEPTUM IN END DIASTOLE (IVSD): 3.11
LEFT INTERNAL DIMENSION IN SYSTOLE (LVSD): 1.2
LEFT VENTRICULAR INTERNAL DIMENSION IN DIASTOLE (LVDD): 3.6
LEFT VENTRICULAR POSTERIOR WALL IN END DIASTOLE (LVPW): 5.3
LV EF: NORMAL %
LVOT 2D (LVOTD): 20.3
LVOT VTI (LVOTVTI): 1.1
MAGNESIUM SERPL-MCNC: 1.8 MG/DL (ref 1.7–2.4)
MCH RBC QN AUTO: 25.7 PG (ref 26–34)
MCHC RBC AUTO-ENTMCNC: 30.6 G/DL (ref 32–36.5)
MCV RBC AUTO: 83.9 FL (ref 78–100)
MV E TISSUE VEL MED (MESV): 10.4
MV E WAVE VEL/E TISSUE VEL MED(MSR): 5.33
MV PEAK A VELOCITY (MVPAV): 227
MV PEAK E VELOCITY (MVPEV): 0.71
NRBC BLD MANUAL-RTO: 0 /100 WBC
PHOSPHATE SERPL-MCNC: 4 MG/DL (ref 2.4–4.7)
PLATELET # BLD AUTO: 460 K/MCL (ref 140–450)
POTASSIUM SERPL-SCNC: 4.3 MMOL/L (ref 3.4–5.1)
RAINBOW EXTRA TUBES HOLD SPECIMEN: NORMAL
RBC # BLD: 3.04 MIL/MCL (ref 4.5–5.9)
RV END SYSTOLIC LONGITUDINAL STRAIN FREE WALL (RVGS): 2.4
SODIUM SERPL-SCNC: 143 MMOL/L (ref 135–145)
TRICUSPID VALVE PEAK REGURGITATION VELOCITY (TRPV): 3.3
TV ESTIMATED RIGHT ARTERIAL PRESSURE (RAP): 17.3
WBC # BLD: 8.7 K/MCL (ref 4.2–11)

## 2023-12-30 PROCEDURE — 80048 BASIC METABOLIC PNL TOTAL CA: CPT

## 2023-12-30 PROCEDURE — 36415 COLL VENOUS BLD VENIPUNCTURE: CPT | Performed by: FAMILY MEDICINE

## 2023-12-30 PROCEDURE — 93306 TTE W/DOPPLER COMPLETE: CPT | Performed by: INTERNAL MEDICINE

## 2023-12-30 PROCEDURE — 83735 ASSAY OF MAGNESIUM: CPT

## 2023-12-30 PROCEDURE — 76376 3D RENDER W/INTRP POSTPROCES: CPT | Performed by: INTERNAL MEDICINE

## 2023-12-30 PROCEDURE — 84100 ASSAY OF PHOSPHORUS: CPT

## 2023-12-30 PROCEDURE — 10004281 HB COUNTER-STAFF TIME PER 15 MIN

## 2023-12-30 PROCEDURE — 10002801 HB RX 250 W/O HCPCS: Performed by: FAMILY MEDICINE

## 2023-12-30 PROCEDURE — 76376 3D RENDER W/INTRP POSTPROCES: CPT

## 2023-12-30 PROCEDURE — 10004651 HB RX, NO CHARGE ITEM: Performed by: FAMILY MEDICINE

## 2023-12-30 PROCEDURE — 10002801 HB RX 250 W/O HCPCS

## 2023-12-30 PROCEDURE — 10002807 HB RX 258

## 2023-12-30 PROCEDURE — 99233 SBSQ HOSP IP/OBS HIGH 50: CPT

## 2023-12-30 PROCEDURE — 93306 TTE W/DOPPLER COMPLETE: CPT

## 2023-12-30 PROCEDURE — 85027 COMPLETE CBC AUTOMATED: CPT

## 2023-12-30 PROCEDURE — 10002800 HB RX 250 W HCPCS

## 2023-12-30 PROCEDURE — 10006031 HB ROOM CHARGE TELEMETRY

## 2023-12-30 PROCEDURE — C9113 INJ PANTOPRAZOLE SODIUM, VIA: HCPCS

## 2023-12-30 PROCEDURE — 10002800 HB RX 250 W HCPCS: Performed by: FAMILY MEDICINE

## 2023-12-30 PROCEDURE — 96372 THER/PROPH/DIAG INJ SC/IM: CPT | Performed by: FAMILY MEDICINE

## 2023-12-30 PROCEDURE — 99233 SBSQ HOSP IP/OBS HIGH 50: CPT | Performed by: FAMILY MEDICINE

## 2023-12-30 RX ADMIN — CEFTRIAXONE SODIUM 2000 MG: 10 INJECTION, POWDER, FOR SOLUTION INTRAVENOUS at 21:45

## 2023-12-30 RX ADMIN — SODIUM CHLORIDE, PRESERVATIVE FREE 2 ML: 5 INJECTION INTRAVENOUS at 21:50

## 2023-12-30 RX ADMIN — SOYBEAN OIL 250 ML: 20 INJECTION, SOLUTION INTRAVENOUS at 22:04

## 2023-12-30 RX ADMIN — SODIUM CHLORIDE, PRESERVATIVE FREE 2 ML: 5 INJECTION INTRAVENOUS at 09:02

## 2023-12-30 RX ADMIN — PANTOPRAZOLE SODIUM 40 MG: 40 INJECTION, POWDER, FOR SOLUTION INTRAVENOUS at 21:44

## 2023-12-30 RX ADMIN — ENOXAPARIN SODIUM 40 MG: 100 INJECTION SUBCUTANEOUS at 09:02

## 2023-12-30 RX ADMIN — METRONIDAZOLE 500 MG: 500 INJECTION, SOLUTION INTRAVENOUS at 05:31

## 2023-12-30 RX ADMIN — PANTOPRAZOLE SODIUM 40 MG: 40 INJECTION, POWDER, FOR SOLUTION INTRAVENOUS at 09:02

## 2023-12-30 RX ADMIN — ASCORBIC ACID, VITAMIN A PALMITATE, CHOLECALCIFEROL, THIAMINE HYDROCHLORIDE, RIBOFLAVIN-5 PHOSPHATE SODIUM, PYRIDOXINE HYDROCHLORIDE, NIACINAMIDE, DEXPANTHENOL, ALPHA-TOCOPHEROL ACETATE, VITAMIN K1, FOLIC ACID, BIOTIN, CYANOCOBALAMIN: 200; 3300; 200; 6; 3.6; 6; 40; 15; 10; 150; 600; 60; 5 INJECTION, SOLUTION INTRAVENOUS at 21:58

## 2023-12-30 RX ADMIN — METRONIDAZOLE 500 MG: 500 INJECTION, SOLUTION INTRAVENOUS at 21:53

## 2023-12-30 ASSESSMENT — PAIN SCALES - GENERAL
PAINLEVEL_OUTOF10: 0
PAINLEVEL_OUTOF10: 0

## 2023-12-31 LAB
ANION GAP SERPL CALC-SCNC: 9 MMOL/L (ref 7–19)
BUN SERPL-MCNC: 34 MG/DL (ref 6–20)
BUN/CREAT SERPL: 24 (ref 7–25)
CALCIUM SERPL-MCNC: 9.4 MG/DL (ref 8.4–10.2)
CHLORIDE SERPL-SCNC: 111 MMOL/L (ref 97–110)
CO2 SERPL-SCNC: 27 MMOL/L (ref 21–32)
CREAT SERPL-MCNC: 1.39 MG/DL (ref 0.67–1.17)
DEPRECATED RDW RBC: 40.8 FL (ref 39–50)
EGFRCR SERPLBLD CKD-EPI 2021: 54 ML/MIN/{1.73_M2}
ERYTHROCYTE [DISTWIDTH] IN BLOOD: 13.6 % (ref 11–15)
FASTING DURATION TIME PATIENT: ABNORMAL H
GLUCOSE BLDC GLUCOMTR-MCNC: 164 MG/DL (ref 70–99)
GLUCOSE BLDC GLUCOMTR-MCNC: 165 MG/DL (ref 70–99)
GLUCOSE BLDC GLUCOMTR-MCNC: 169 MG/DL (ref 70–99)
GLUCOSE SERPL-MCNC: 168 MG/DL (ref 70–99)
HCT VFR BLD CALC: 25.6 % (ref 39–51)
HGB BLD-MCNC: 8 G/DL (ref 13–17)
MAGNESIUM SERPL-MCNC: 1.7 MG/DL (ref 1.7–2.4)
MCH RBC QN AUTO: 25.6 PG (ref 26–34)
MCHC RBC AUTO-ENTMCNC: 31.3 G/DL (ref 32–36.5)
MCV RBC AUTO: 82.1 FL (ref 78–100)
NRBC BLD MANUAL-RTO: 0 /100 WBC
PHOSPHATE SERPL-MCNC: 4 MG/DL (ref 2.4–4.7)
PLATELET # BLD AUTO: 509 K/MCL (ref 140–450)
POTASSIUM SERPL-SCNC: 4.4 MMOL/L (ref 3.4–5.1)
RAINBOW EXTRA TUBES HOLD SPECIMEN: NORMAL
RBC # BLD: 3.12 MIL/MCL (ref 4.5–5.9)
SODIUM SERPL-SCNC: 143 MMOL/L (ref 135–145)
WBC # BLD: 11.2 K/MCL (ref 4.2–11)

## 2023-12-31 PROCEDURE — 36415 COLL VENOUS BLD VENIPUNCTURE: CPT

## 2023-12-31 PROCEDURE — 10002801 HB RX 250 W/O HCPCS

## 2023-12-31 PROCEDURE — 99233 SBSQ HOSP IP/OBS HIGH 50: CPT | Performed by: SURGERY

## 2023-12-31 PROCEDURE — 80048 BASIC METABOLIC PNL TOTAL CA: CPT

## 2023-12-31 PROCEDURE — 10004651 HB RX, NO CHARGE ITEM: Performed by: FAMILY MEDICINE

## 2023-12-31 PROCEDURE — 10002801 HB RX 250 W/O HCPCS: Performed by: FAMILY MEDICINE

## 2023-12-31 PROCEDURE — C9113 INJ PANTOPRAZOLE SODIUM, VIA: HCPCS

## 2023-12-31 PROCEDURE — 84100 ASSAY OF PHOSPHORUS: CPT

## 2023-12-31 PROCEDURE — 10002800 HB RX 250 W HCPCS: Performed by: FAMILY MEDICINE

## 2023-12-31 PROCEDURE — 10002807 HB RX 258

## 2023-12-31 PROCEDURE — 96372 THER/PROPH/DIAG INJ SC/IM: CPT | Performed by: FAMILY MEDICINE

## 2023-12-31 PROCEDURE — 83735 ASSAY OF MAGNESIUM: CPT

## 2023-12-31 PROCEDURE — 10002800 HB RX 250 W HCPCS

## 2023-12-31 PROCEDURE — 10006031 HB ROOM CHARGE TELEMETRY

## 2023-12-31 PROCEDURE — 85027 COMPLETE CBC AUTOMATED: CPT

## 2023-12-31 PROCEDURE — 99233 SBSQ HOSP IP/OBS HIGH 50: CPT | Performed by: FAMILY MEDICINE

## 2023-12-31 RX ADMIN — SODIUM CHLORIDE, PRESERVATIVE FREE 2 ML: 5 INJECTION INTRAVENOUS at 10:11

## 2023-12-31 RX ADMIN — CEFTRIAXONE SODIUM 2000 MG: 10 INJECTION, POWDER, FOR SOLUTION INTRAVENOUS at 20:48

## 2023-12-31 RX ADMIN — ENOXAPARIN SODIUM 40 MG: 100 INJECTION SUBCUTANEOUS at 10:10

## 2023-12-31 RX ADMIN — METRONIDAZOLE 500 MG: 500 INJECTION, SOLUTION INTRAVENOUS at 05:55

## 2023-12-31 RX ADMIN — SODIUM CHLORIDE, PRESERVATIVE FREE 2 ML: 5 INJECTION INTRAVENOUS at 20:48

## 2023-12-31 RX ADMIN — MAGNESIUM SULFATE HEPTAHYDRATE 2 G: 40 INJECTION, SOLUTION INTRAVENOUS at 10:18

## 2023-12-31 RX ADMIN — PANTOPRAZOLE SODIUM 40 MG: 40 INJECTION, POWDER, FOR SOLUTION INTRAVENOUS at 10:04

## 2023-12-31 RX ADMIN — SOYBEAN OIL 250 ML: 20 INJECTION, SOLUTION INTRAVENOUS at 22:19

## 2023-12-31 RX ADMIN — METRONIDAZOLE 500 MG: 500 INJECTION, SOLUTION INTRAVENOUS at 22:19

## 2023-12-31 RX ADMIN — PANTOPRAZOLE SODIUM 40 MG: 40 INJECTION, POWDER, FOR SOLUTION INTRAVENOUS at 20:48

## 2023-12-31 RX ADMIN — METRONIDAZOLE 500 MG: 500 INJECTION, SOLUTION INTRAVENOUS at 14:55

## 2023-12-31 RX ADMIN — ASCORBIC ACID, VITAMIN A PALMITATE, CHOLECALCIFEROL, THIAMINE HYDROCHLORIDE, RIBOFLAVIN-5 PHOSPHATE SODIUM, PYRIDOXINE HYDROCHLORIDE, NIACINAMIDE, DEXPANTHENOL, ALPHA-TOCOPHEROL ACETATE, VITAMIN K1, FOLIC ACID, BIOTIN, CYANOCOBALAMIN: 200; 3300; 200; 6; 3.6; 6; 40; 15; 10; 150; 600; 60; 5 INJECTION, SOLUTION INTRAVENOUS at 22:20

## 2023-12-31 ASSESSMENT — PAIN SCALES - GENERAL
PAINLEVEL_OUTOF10: 0
PAINLEVEL_OUTOF10: 0

## 2024-01-01 LAB
ALBUMIN SERPL-MCNC: 2.7 G/DL (ref 3.6–5.1)
ALBUMIN/GLOB SERPL: 0.5 {RATIO} (ref 1–2.4)
ALP SERPL-CCNC: 93 UNITS/L (ref 45–117)
ALT SERPL-CCNC: 20 UNITS/L
ANION GAP SERPL CALC-SCNC: 10 MMOL/L (ref 7–19)
AST SERPL-CCNC: 17 UNITS/L
BASOPHILS # BLD: 0.1 K/MCL (ref 0–0.3)
BASOPHILS NFR BLD: 1 %
BILIRUB SERPL-MCNC: 0.1 MG/DL (ref 0.2–1)
BUN SERPL-MCNC: 41 MG/DL (ref 6–20)
BUN/CREAT SERPL: 26 (ref 7–25)
CA-I ADJ PH7.4 SERPL-SCNC: 1.23 MMOL/L (ref 1.15–1.29)
CA-I SERPL ISE-SCNC: 1.27 MMOL/L (ref 1.15–1.29)
CALCIUM SERPL-MCNC: 9.3 MG/DL (ref 8.4–10.2)
CHLORIDE SERPL-SCNC: 111 MMOL/L (ref 97–110)
CO2 SERPL-SCNC: 27 MMOL/L (ref 21–32)
CREAT SERPL-MCNC: 1.55 MG/DL (ref 0.67–1.17)
DEPRECATED RDW RBC: 41.2 FL (ref 39–50)
EGFRCR SERPLBLD CKD-EPI 2021: 47 ML/MIN/{1.73_M2}
EOSINOPHIL # BLD: 0.6 K/MCL (ref 0–0.5)
EOSINOPHIL NFR BLD: 6 %
ERYTHROCYTE [DISTWIDTH] IN BLOOD: 13.6 % (ref 11–15)
FASTING DURATION TIME PATIENT: ABNORMAL H
GLOBULIN SER-MCNC: 5.4 G/DL (ref 2–4)
GLUCOSE BLDC GLUCOMTR-MCNC: 144 MG/DL (ref 70–99)
GLUCOSE BLDC GLUCOMTR-MCNC: 152 MG/DL (ref 70–99)
GLUCOSE BLDC GLUCOMTR-MCNC: 152 MG/DL (ref 70–99)
GLUCOSE SERPL-MCNC: 167 MG/DL (ref 70–99)
HCT VFR BLD CALC: 27.7 % (ref 39–51)
HGB BLD-MCNC: 8.4 G/DL (ref 13–17)
IMM GRANULOCYTES # BLD AUTO: 0 K/MCL (ref 0–0.2)
IMM GRANULOCYTES # BLD: 0 %
INR PPP: 1.2
LYMPHOCYTES # BLD: 1.3 K/MCL (ref 1–4)
LYMPHOCYTES NFR BLD: 12 %
MAGNESIUM SERPL-MCNC: 2.1 MG/DL (ref 1.7–2.4)
MAGNESIUM SERPL-MCNC: 2.1 MG/DL (ref 1.7–2.4)
MCH RBC QN AUTO: 25.2 PG (ref 26–34)
MCHC RBC AUTO-ENTMCNC: 30.3 G/DL (ref 32–36.5)
MCV RBC AUTO: 83.2 FL (ref 78–100)
MONOCYTES # BLD: 0.9 K/MCL (ref 0.3–0.9)
MONOCYTES NFR BLD: 8 %
NEUTROPHILS # BLD: 8.1 K/MCL (ref 1.8–7.7)
NEUTROPHILS NFR BLD: 73 %
NRBC BLD MANUAL-RTO: 0 /100 WBC
PHOSPHATE SERPL-MCNC: 4.6 MG/DL (ref 2.4–4.7)
PLATELET # BLD AUTO: 483 K/MCL (ref 140–450)
POTASSIUM SERPL-SCNC: 4.2 MMOL/L (ref 3.4–5.1)
PROT SERPL-MCNC: 8.1 G/DL (ref 6.4–8.2)
PROTHROMBIN TIME: 12.8 SEC (ref 9.7–11.8)
RBC # BLD: 3.33 MIL/MCL (ref 4.5–5.9)
SODIUM SERPL-SCNC: 144 MMOL/L (ref 135–145)
TRIGL SERPL-MCNC: 172 MG/DL
WBC # BLD: 11.1 K/MCL (ref 4.2–11)

## 2024-01-01 PROCEDURE — 10002800 HB RX 250 W HCPCS: Performed by: FAMILY MEDICINE

## 2024-01-01 PROCEDURE — 99232 SBSQ HOSP IP/OBS MODERATE 35: CPT | Performed by: FAMILY MEDICINE

## 2024-01-01 PROCEDURE — 10006031 HB ROOM CHARGE TELEMETRY

## 2024-01-01 PROCEDURE — 83735 ASSAY OF MAGNESIUM: CPT

## 2024-01-01 PROCEDURE — 85025 COMPLETE CBC W/AUTO DIFF WBC: CPT

## 2024-01-01 PROCEDURE — 10004651 HB RX, NO CHARGE ITEM: Performed by: FAMILY MEDICINE

## 2024-01-01 PROCEDURE — C9113 INJ PANTOPRAZOLE SODIUM, VIA: HCPCS

## 2024-01-01 PROCEDURE — 10002800 HB RX 250 W HCPCS

## 2024-01-01 PROCEDURE — 85610 PROTHROMBIN TIME: CPT

## 2024-01-01 PROCEDURE — 83735 ASSAY OF MAGNESIUM: CPT | Performed by: FAMILY MEDICINE

## 2024-01-01 PROCEDURE — 36415 COLL VENOUS BLD VENIPUNCTURE: CPT | Performed by: FAMILY MEDICINE

## 2024-01-01 PROCEDURE — 10002807 HB RX 258

## 2024-01-01 PROCEDURE — 80053 COMPREHEN METABOLIC PANEL: CPT

## 2024-01-01 PROCEDURE — 96372 THER/PROPH/DIAG INJ SC/IM: CPT | Performed by: FAMILY MEDICINE

## 2024-01-01 PROCEDURE — 99233 SBSQ HOSP IP/OBS HIGH 50: CPT | Performed by: SURGERY

## 2024-01-01 PROCEDURE — 84100 ASSAY OF PHOSPHORUS: CPT

## 2024-01-01 PROCEDURE — 10002801 HB RX 250 W/O HCPCS: Performed by: FAMILY MEDICINE

## 2024-01-01 PROCEDURE — 10002801 HB RX 250 W/O HCPCS

## 2024-01-01 PROCEDURE — 82330 ASSAY OF CALCIUM: CPT

## 2024-01-01 PROCEDURE — 84478 ASSAY OF TRIGLYCERIDES: CPT

## 2024-01-01 RX ADMIN — METRONIDAZOLE 500 MG: 500 INJECTION, SOLUTION INTRAVENOUS at 05:56

## 2024-01-01 RX ADMIN — SOYBEAN OIL 250 ML: 20 INJECTION, SOLUTION INTRAVENOUS at 22:03

## 2024-01-01 RX ADMIN — METRONIDAZOLE 500 MG: 500 INJECTION, SOLUTION INTRAVENOUS at 21:56

## 2024-01-01 RX ADMIN — MORPHINE SULFATE 4 MG: 4 INJECTION INTRAVENOUS at 05:53

## 2024-01-01 RX ADMIN — SODIUM CHLORIDE, PRESERVATIVE FREE 2 ML: 5 INJECTION INTRAVENOUS at 21:58

## 2024-01-01 RX ADMIN — PANTOPRAZOLE SODIUM 40 MG: 40 INJECTION, POWDER, FOR SOLUTION INTRAVENOUS at 21:48

## 2024-01-01 RX ADMIN — ASCORBIC ACID, VITAMIN A PALMITATE, CHOLECALCIFEROL, THIAMINE HYDROCHLORIDE, RIBOFLAVIN-5 PHOSPHATE SODIUM, PYRIDOXINE HYDROCHLORIDE, NIACINAMIDE, DEXPANTHENOL, ALPHA-TOCOPHEROL ACETATE, VITAMIN K1, FOLIC ACID, BIOTIN, CYANOCOBALAMIN: 200; 3300; 200; 6; 3.6; 6; 40; 15; 10; 150; 600; 60; 5 INJECTION, SOLUTION INTRAVENOUS at 22:01

## 2024-01-01 RX ADMIN — SODIUM CHLORIDE, PRESERVATIVE FREE 2 ML: 5 INJECTION INTRAVENOUS at 09:11

## 2024-01-01 RX ADMIN — PANTOPRAZOLE SODIUM 40 MG: 40 INJECTION, POWDER, FOR SOLUTION INTRAVENOUS at 09:07

## 2024-01-01 RX ADMIN — METRONIDAZOLE 500 MG: 500 INJECTION, SOLUTION INTRAVENOUS at 14:18

## 2024-01-01 RX ADMIN — ENOXAPARIN SODIUM 40 MG: 100 INJECTION SUBCUTANEOUS at 09:11

## 2024-01-01 RX ADMIN — CEFTRIAXONE SODIUM 2000 MG: 10 INJECTION, POWDER, FOR SOLUTION INTRAVENOUS at 21:48

## 2024-01-01 ASSESSMENT — PAIN SCALES - GENERAL
PAINLEVEL_OUTOF10: 2
PAINLEVEL_OUTOF10: 7
PAINLEVEL_OUTOF10: 2
PAINLEVEL_OUTOF10: 4

## 2024-01-02 ENCOUNTER — TELEPHONE (OUTPATIENT)
Dept: GASTROENTEROLOGY | Age: 74
End: 2024-01-02

## 2024-01-02 LAB
ALBUMIN SERPL-MCNC: 2.7 G/DL (ref 3.6–5.1)
ALBUMIN/GLOB SERPL: 0.5 {RATIO} (ref 1–2.4)
ALP SERPL-CCNC: 89 UNITS/L (ref 45–117)
ALT SERPL-CCNC: 23 UNITS/L
ANION GAP SERPL CALC-SCNC: 9 MMOL/L (ref 7–19)
AST SERPL-CCNC: 22 UNITS/L
BILIRUB SERPL-MCNC: 0.2 MG/DL (ref 0.2–1)
BUN SERPL-MCNC: 51 MG/DL (ref 6–20)
BUN/CREAT SERPL: 34 (ref 7–25)
CALCIUM SERPL-MCNC: 9.1 MG/DL (ref 8.4–10.2)
CHLORIDE SERPL-SCNC: 112 MMOL/L (ref 97–110)
CO2 SERPL-SCNC: 26 MMOL/L (ref 21–32)
CREAT SERPL-MCNC: 1.5 MG/DL (ref 0.67–1.17)
DEPRECATED RDW RBC: 41.6 FL (ref 39–50)
EGFRCR SERPLBLD CKD-EPI 2021: 49 ML/MIN/{1.73_M2}
ERYTHROCYTE [DISTWIDTH] IN BLOOD: 13.7 % (ref 11–15)
FASTING DURATION TIME PATIENT: ABNORMAL H
GLOBULIN SER-MCNC: 5.2 G/DL (ref 2–4)
GLUCOSE BLDC GLUCOMTR-MCNC: 172 MG/DL (ref 70–99)
GLUCOSE BLDC GLUCOMTR-MCNC: 58 MG/DL (ref 70–99)
GLUCOSE SERPL-MCNC: 158 MG/DL (ref 70–99)
HCT VFR BLD CALC: 28.1 % (ref 39–51)
HGB BLD-MCNC: 8.5 G/DL (ref 13–17)
MAGNESIUM SERPL-MCNC: 2.2 MG/DL (ref 1.7–2.4)
MCH RBC QN AUTO: 25.1 PG (ref 26–34)
MCHC RBC AUTO-ENTMCNC: 30.2 G/DL (ref 32–36.5)
MCV RBC AUTO: 82.9 FL (ref 78–100)
NRBC BLD MANUAL-RTO: 0 /100 WBC
PHOSPHATE SERPL-MCNC: 4.7 MG/DL (ref 2.4–4.7)
PLATELET # BLD AUTO: 538 K/MCL (ref 140–450)
POTASSIUM SERPL-SCNC: 4.1 MMOL/L (ref 3.4–5.1)
PROT SERPL-MCNC: 7.9 G/DL (ref 6.4–8.2)
RAINBOW EXTRA TUBES HOLD SPECIMEN: NORMAL
RBC # BLD: 3.39 MIL/MCL (ref 4.5–5.9)
SODIUM SERPL-SCNC: 143 MMOL/L (ref 135–145)
WBC # BLD: 10.6 K/MCL (ref 4.2–11)

## 2024-01-02 PROCEDURE — 10002800 HB RX 250 W HCPCS: Performed by: FAMILY MEDICINE

## 2024-01-02 PROCEDURE — 80053 COMPREHEN METABOLIC PANEL: CPT

## 2024-01-02 PROCEDURE — 83735 ASSAY OF MAGNESIUM: CPT

## 2024-01-02 PROCEDURE — 99233 SBSQ HOSP IP/OBS HIGH 50: CPT

## 2024-01-02 PROCEDURE — 85027 COMPLETE CBC AUTOMATED: CPT | Performed by: NURSE PRACTITIONER

## 2024-01-02 PROCEDURE — 10004651 HB RX, NO CHARGE ITEM: Performed by: FAMILY MEDICINE

## 2024-01-02 PROCEDURE — 10006031 HB ROOM CHARGE TELEMETRY

## 2024-01-02 PROCEDURE — 99232 SBSQ HOSP IP/OBS MODERATE 35: CPT | Performed by: FAMILY MEDICINE

## 2024-01-02 PROCEDURE — 10006023 HB SUPPLY 272

## 2024-01-02 PROCEDURE — 36415 COLL VENOUS BLD VENIPUNCTURE: CPT

## 2024-01-02 PROCEDURE — C1751 CATH, INF, PER/CENT/MIDLINE: HCPCS

## 2024-01-02 PROCEDURE — 10002801 HB RX 250 W/O HCPCS: Performed by: FAMILY MEDICINE

## 2024-01-02 PROCEDURE — 10004281 HB COUNTER-STAFF TIME PER 15 MIN

## 2024-01-02 PROCEDURE — 10002807 HB RX 258

## 2024-01-02 PROCEDURE — 84100 ASSAY OF PHOSPHORUS: CPT

## 2024-01-02 PROCEDURE — 10002801 HB RX 250 W/O HCPCS: Performed by: INTERNAL MEDICINE

## 2024-01-02 PROCEDURE — C9113 INJ PANTOPRAZOLE SODIUM, VIA: HCPCS

## 2024-01-02 PROCEDURE — 99223 1ST HOSP IP/OBS HIGH 75: CPT | Performed by: INTERNAL MEDICINE

## 2024-01-02 PROCEDURE — 10002800 HB RX 250 W HCPCS

## 2024-01-02 PROCEDURE — 36573 INSJ PICC RS&I 5 YR+: CPT

## 2024-01-02 PROCEDURE — 96372 THER/PROPH/DIAG INJ SC/IM: CPT | Performed by: FAMILY MEDICINE

## 2024-01-02 RX ORDER — 0.9 % SODIUM CHLORIDE 0.9 %
10 VIAL (ML) INJECTION PRN
Status: DISCONTINUED | OUTPATIENT
Start: 2024-01-02 | End: 2024-01-05 | Stop reason: HOSPADM

## 2024-01-02 RX ORDER — DEXTROSE MONOHYDRATE 25 G/50ML
25 INJECTION, SOLUTION INTRAVENOUS PRN
Status: DISCONTINUED | OUTPATIENT
Start: 2024-01-02 | End: 2024-01-05 | Stop reason: HOSPADM

## 2024-01-02 RX ORDER — 0.9 % SODIUM CHLORIDE 0.9 %
10 VIAL (ML) INJECTION EVERY 12 HOURS SCHEDULED
Status: DISCONTINUED | OUTPATIENT
Start: 2024-01-02 | End: 2024-01-05 | Stop reason: HOSPADM

## 2024-01-02 RX ORDER — NICOTINE POLACRILEX 4 MG
15 LOZENGE BUCCAL PRN
Status: DISCONTINUED | OUTPATIENT
Start: 2024-01-02 | End: 2024-01-05 | Stop reason: HOSPADM

## 2024-01-02 RX ORDER — NICOTINE POLACRILEX 4 MG
30 LOZENGE BUCCAL PRN
Status: DISCONTINUED | OUTPATIENT
Start: 2024-01-02 | End: 2024-01-05 | Stop reason: HOSPADM

## 2024-01-02 RX ORDER — DEXTROSE MONOHYDRATE 25 G/50ML
12.5 INJECTION, SOLUTION INTRAVENOUS PRN
Status: DISCONTINUED | OUTPATIENT
Start: 2024-01-02 | End: 2024-01-05 | Stop reason: HOSPADM

## 2024-01-02 RX ORDER — 0.9 % SODIUM CHLORIDE 0.9 %
20 VIAL (ML) INJECTION PRN
Status: DISCONTINUED | OUTPATIENT
Start: 2024-01-02 | End: 2024-01-05 | Stop reason: HOSPADM

## 2024-01-02 RX ADMIN — LYSINE, LEUCINE, PHENYLALANINE, VALINE, HISTIDINE, ISOLEUCINE, METHIONINE, THREONINE, TRYPTOPHAN, ALANINE, ARGININE, GLYCINE, PROLINE, GLUTAMIC ACID, SERINE, ASPARTIC ACID, TYROSINE
1.18; 1.04; 1.04; 960; 894; 749; 749; 749; 250; 2.17; 1.47; 1.04; 894; 749; 592; 434; 39 INJECTION, SOLUTION INTRAVENOUS at 22:05

## 2024-01-02 RX ADMIN — PANTOPRAZOLE SODIUM 40 MG: 40 INJECTION, POWDER, FOR SOLUTION INTRAVENOUS at 08:30

## 2024-01-02 RX ADMIN — METRONIDAZOLE 500 MG: 500 INJECTION, SOLUTION INTRAVENOUS at 13:51

## 2024-01-02 RX ADMIN — SODIUM CHLORIDE, PRESERVATIVE FREE 10 ML: 5 INJECTION INTRAVENOUS at 13:52

## 2024-01-02 RX ADMIN — CEFTRIAXONE SODIUM 2000 MG: 10 INJECTION, POWDER, FOR SOLUTION INTRAVENOUS at 20:58

## 2024-01-02 RX ADMIN — DEXTROSE MONOHYDRATE 12.5 G: 25 INJECTION, SOLUTION INTRAVENOUS at 21:52

## 2024-01-02 RX ADMIN — METRONIDAZOLE 500 MG: 500 INJECTION, SOLUTION INTRAVENOUS at 21:55

## 2024-01-02 RX ADMIN — SODIUM CHLORIDE, PRESERVATIVE FREE 2 ML: 5 INJECTION INTRAVENOUS at 21:53

## 2024-01-02 RX ADMIN — PANTOPRAZOLE SODIUM 40 MG: 40 INJECTION, POWDER, FOR SOLUTION INTRAVENOUS at 20:55

## 2024-01-02 RX ADMIN — SOYBEAN OIL 250 ML: 20 INJECTION, SOLUTION INTRAVENOUS at 22:05

## 2024-01-02 RX ADMIN — ENOXAPARIN SODIUM 40 MG: 100 INJECTION SUBCUTANEOUS at 08:30

## 2024-01-02 RX ADMIN — METRONIDAZOLE 500 MG: 500 INJECTION, SOLUTION INTRAVENOUS at 06:00

## 2024-01-02 RX ADMIN — SODIUM CHLORIDE, PRESERVATIVE FREE 2 ML: 5 INJECTION INTRAVENOUS at 08:30

## 2024-01-02 RX ADMIN — SODIUM CHLORIDE, PRESERVATIVE FREE 10 ML: 5 INJECTION INTRAVENOUS at 20:55

## 2024-01-02 ASSESSMENT — PAIN SCALES - GENERAL
PAINLEVEL_OUTOF10: 3
PAINLEVEL_OUTOF10: 2

## 2024-01-03 LAB
ALBUMIN SERPL-MCNC: 2.5 G/DL (ref 3.6–5.1)
ALBUMIN/GLOB SERPL: 0.5 {RATIO} (ref 1–2.4)
ALP SERPL-CCNC: 83 UNITS/L (ref 45–117)
ALT SERPL-CCNC: 21 UNITS/L
ANION GAP SERPL CALC-SCNC: 9 MMOL/L (ref 7–19)
AST SERPL-CCNC: 16 UNITS/L
BACTERIA BLD CULT: NORMAL
BACTERIA BLD CULT: NORMAL
BILIRUB SERPL-MCNC: 0.2 MG/DL (ref 0.2–1)
BUN SERPL-MCNC: 54 MG/DL (ref 6–20)
BUN/CREAT SERPL: 41 (ref 7–25)
CALCIUM SERPL-MCNC: 8.3 MG/DL (ref 8.4–10.2)
CHLORIDE SERPL-SCNC: 114 MMOL/L (ref 97–110)
CO2 SERPL-SCNC: 25 MMOL/L (ref 21–32)
CREAT SERPL-MCNC: 1.33 MG/DL (ref 0.67–1.17)
EGFRCR SERPLBLD CKD-EPI 2021: 57 ML/MIN/{1.73_M2}
FASTING DURATION TIME PATIENT: ABNORMAL H
GLOBULIN SER-MCNC: 4.7 G/DL (ref 2–4)
GLUCOSE BLDC GLUCOMTR-MCNC: 119 MG/DL (ref 70–99)
GLUCOSE BLDC GLUCOMTR-MCNC: 128 MG/DL (ref 70–99)
GLUCOSE BLDC GLUCOMTR-MCNC: 139 MG/DL (ref 70–99)
GLUCOSE BLDC GLUCOMTR-MCNC: 166 MG/DL (ref 70–99)
GLUCOSE SERPL-MCNC: 160 MG/DL (ref 70–99)
MAGNESIUM SERPL-MCNC: 2 MG/DL (ref 1.7–2.4)
PHOSPHATE SERPL-MCNC: 3.8 MG/DL (ref 2.4–4.7)
POTASSIUM SERPL-SCNC: 3.6 MMOL/L (ref 3.4–5.1)
PROT SERPL-MCNC: 7.2 G/DL (ref 6.4–8.2)
SODIUM SERPL-SCNC: 144 MMOL/L (ref 135–145)

## 2024-01-03 PROCEDURE — 99233 SBSQ HOSP IP/OBS HIGH 50: CPT | Performed by: FAMILY MEDICINE

## 2024-01-03 PROCEDURE — 10004651 HB RX, NO CHARGE ITEM: Performed by: FAMILY MEDICINE

## 2024-01-03 PROCEDURE — 96372 THER/PROPH/DIAG INJ SC/IM: CPT | Performed by: FAMILY MEDICINE

## 2024-01-03 PROCEDURE — 83735 ASSAY OF MAGNESIUM: CPT

## 2024-01-03 PROCEDURE — 99232 SBSQ HOSP IP/OBS MODERATE 35: CPT | Performed by: STUDENT IN AN ORGANIZED HEALTH CARE EDUCATION/TRAINING PROGRAM

## 2024-01-03 PROCEDURE — 10002801 HB RX 250 W/O HCPCS: Performed by: FAMILY MEDICINE

## 2024-01-03 PROCEDURE — 10006031 HB ROOM CHARGE TELEMETRY

## 2024-01-03 PROCEDURE — 80053 COMPREHEN METABOLIC PANEL: CPT

## 2024-01-03 PROCEDURE — 10002800 HB RX 250 W HCPCS: Performed by: FAMILY MEDICINE

## 2024-01-03 PROCEDURE — 84100 ASSAY OF PHOSPHORUS: CPT

## 2024-01-03 PROCEDURE — 10002807 HB RX 258

## 2024-01-03 PROCEDURE — C9113 INJ PANTOPRAZOLE SODIUM, VIA: HCPCS

## 2024-01-03 PROCEDURE — 10002800 HB RX 250 W HCPCS

## 2024-01-03 RX ORDER — POTASSIUM CHLORIDE 14.9 MG/ML
20 INJECTION INTRAVENOUS ONCE
Status: COMPLETED | OUTPATIENT
Start: 2024-01-03 | End: 2024-01-03

## 2024-01-03 RX ADMIN — SODIUM CHLORIDE, PRESERVATIVE FREE 10 ML: 5 INJECTION INTRAVENOUS at 08:46

## 2024-01-03 RX ADMIN — SODIUM CHLORIDE, PRESERVATIVE FREE 10 ML: 5 INJECTION INTRAVENOUS at 21:43

## 2024-01-03 RX ADMIN — ENOXAPARIN SODIUM 40 MG: 100 INJECTION SUBCUTANEOUS at 08:47

## 2024-01-03 RX ADMIN — METRONIDAZOLE 500 MG: 500 INJECTION, SOLUTION INTRAVENOUS at 21:32

## 2024-01-03 RX ADMIN — CEFTRIAXONE SODIUM 2000 MG: 10 INJECTION, POWDER, FOR SOLUTION INTRAVENOUS at 21:25

## 2024-01-03 RX ADMIN — PANTOPRAZOLE SODIUM 40 MG: 40 INJECTION, POWDER, FOR SOLUTION INTRAVENOUS at 21:28

## 2024-01-03 RX ADMIN — SODIUM CHLORIDE, PRESERVATIVE FREE 2 ML: 5 INJECTION INTRAVENOUS at 08:46

## 2024-01-03 RX ADMIN — LYSINE, LEUCINE, PHENYLALANINE, VALINE, HISTIDINE, ISOLEUCINE, METHIONINE, THREONINE, TRYPTOPHAN, ALANINE, ARGININE, GLYCINE, PROLINE, GLUTAMIC ACID, SERINE, ASPARTIC ACID, TYROSINE
1.18; 1.04; 1.04; 960; 894; 749; 749; 749; 250; 2.17; 1.47; 1.04; 894; 749; 592; 434; 39 INJECTION, SOLUTION INTRAVENOUS at 21:38

## 2024-01-03 RX ADMIN — POTASSIUM CHLORIDE 20 MEQ: 14.9 INJECTION, SOLUTION INTRAVENOUS at 12:23

## 2024-01-03 RX ADMIN — PANTOPRAZOLE SODIUM 40 MG: 40 INJECTION, POWDER, FOR SOLUTION INTRAVENOUS at 08:47

## 2024-01-03 RX ADMIN — SODIUM CHLORIDE, PRESERVATIVE FREE 2 ML: 5 INJECTION INTRAVENOUS at 21:43

## 2024-01-03 RX ADMIN — SOYBEAN OIL 250 ML: 20 INJECTION, SOLUTION INTRAVENOUS at 21:42

## 2024-01-03 RX ADMIN — METRONIDAZOLE 500 MG: 500 INJECTION, SOLUTION INTRAVENOUS at 05:51

## 2024-01-03 RX ADMIN — POTASSIUM CHLORIDE 20 MEQ: 14.9 INJECTION, SOLUTION INTRAVENOUS at 10:19

## 2024-01-03 RX ADMIN — METRONIDAZOLE 500 MG: 500 INJECTION, SOLUTION INTRAVENOUS at 13:35

## 2024-01-03 ASSESSMENT — PAIN SCALES - GENERAL
PAINLEVEL_OUTOF10: 0
PAINLEVEL_OUTOF10: 0

## 2024-01-04 ENCOUNTER — APPOINTMENT (OUTPATIENT)
Dept: GENERAL RADIOLOGY | Age: 74
DRG: 384 | End: 2024-01-04

## 2024-01-04 LAB
ALBUMIN SERPL-MCNC: 2.7 G/DL (ref 3.6–5.1)
ALBUMIN/GLOB SERPL: 0.5 {RATIO} (ref 1–2.4)
ALP SERPL-CCNC: 92 UNITS/L (ref 45–117)
ALT SERPL-CCNC: 22 UNITS/L
ANION GAP SERPL CALC-SCNC: 9 MMOL/L (ref 7–19)
AST SERPL-CCNC: 16 UNITS/L
BILIRUB SERPL-MCNC: 0.3 MG/DL (ref 0.2–1)
BUN SERPL-MCNC: 55 MG/DL (ref 6–20)
BUN/CREAT SERPL: 40 (ref 7–25)
CALCIUM SERPL-MCNC: 9.2 MG/DL (ref 8.4–10.2)
CHLORIDE SERPL-SCNC: 111 MMOL/L (ref 97–110)
CO2 SERPL-SCNC: 28 MMOL/L (ref 21–32)
CREAT SERPL-MCNC: 1.39 MG/DL (ref 0.67–1.17)
EGFRCR SERPLBLD CKD-EPI 2021: 54 ML/MIN/{1.73_M2}
FASTING DURATION TIME PATIENT: ABNORMAL H
GLOBULIN SER-MCNC: 5.2 G/DL (ref 2–4)
GLUCOSE BLDC GLUCOMTR-MCNC: 134 MG/DL (ref 70–99)
GLUCOSE BLDC GLUCOMTR-MCNC: 149 MG/DL (ref 70–99)
GLUCOSE SERPL-MCNC: 153 MG/DL (ref 70–99)
MAGNESIUM SERPL-MCNC: 2.2 MG/DL (ref 1.7–2.4)
MAGNESIUM SERPL-MCNC: 2.2 MG/DL (ref 1.7–2.4)
PHOSPHATE SERPL-MCNC: 3.7 MG/DL (ref 2.4–4.7)
PHOSPHATE SERPL-MCNC: 3.8 MG/DL (ref 2.4–4.7)
POTASSIUM SERPL-SCNC: 4 MMOL/L (ref 3.4–5.1)
PROT SERPL-MCNC: 7.9 G/DL (ref 6.4–8.2)
SODIUM SERPL-SCNC: 144 MMOL/L (ref 135–145)

## 2024-01-04 PROCEDURE — 10002805 HB CONTRAST AGENT

## 2024-01-04 PROCEDURE — 10002807 HB RX 258

## 2024-01-04 PROCEDURE — 10006031 HB ROOM CHARGE TELEMETRY

## 2024-01-04 PROCEDURE — C9113 INJ PANTOPRAZOLE SODIUM, VIA: HCPCS

## 2024-01-04 PROCEDURE — 10004651 HB RX, NO CHARGE ITEM: Performed by: FAMILY MEDICINE

## 2024-01-04 PROCEDURE — 10002801 HB RX 250 W/O HCPCS: Performed by: FAMILY MEDICINE

## 2024-01-04 PROCEDURE — 84100 ASSAY OF PHOSPHORUS: CPT

## 2024-01-04 PROCEDURE — 74240 X-RAY XM UPR GI TRC 1CNTRST: CPT

## 2024-01-04 PROCEDURE — 80053 COMPREHEN METABOLIC PANEL: CPT

## 2024-01-04 PROCEDURE — 99232 SBSQ HOSP IP/OBS MODERATE 35: CPT | Performed by: FAMILY MEDICINE

## 2024-01-04 PROCEDURE — 96372 THER/PROPH/DIAG INJ SC/IM: CPT | Performed by: FAMILY MEDICINE

## 2024-01-04 PROCEDURE — 99233 SBSQ HOSP IP/OBS HIGH 50: CPT

## 2024-01-04 PROCEDURE — 10002800 HB RX 250 W HCPCS: Performed by: FAMILY MEDICINE

## 2024-01-04 PROCEDURE — 83735 ASSAY OF MAGNESIUM: CPT

## 2024-01-04 PROCEDURE — 10002800 HB RX 250 W HCPCS

## 2024-01-04 RX ADMIN — SODIUM CHLORIDE, PRESERVATIVE FREE 10 ML: 5 INJECTION INTRAVENOUS at 22:46

## 2024-01-04 RX ADMIN — IOHEXOL 100 ML: 350 INJECTION, SOLUTION INTRAVENOUS at 09:51

## 2024-01-04 RX ADMIN — PANTOPRAZOLE SODIUM 40 MG: 40 INJECTION, POWDER, FOR SOLUTION INTRAVENOUS at 08:00

## 2024-01-04 RX ADMIN — METRONIDAZOLE 500 MG: 500 INJECTION, SOLUTION INTRAVENOUS at 13:34

## 2024-01-04 RX ADMIN — CEFTRIAXONE SODIUM 2000 MG: 10 INJECTION, POWDER, FOR SOLUTION INTRAVENOUS at 21:03

## 2024-01-04 RX ADMIN — PANTOPRAZOLE SODIUM 40 MG: 40 INJECTION, POWDER, FOR SOLUTION INTRAVENOUS at 21:02

## 2024-01-04 RX ADMIN — LYSINE, LEUCINE, PHENYLALANINE, VALINE, HISTIDINE, ISOLEUCINE, METHIONINE, THREONINE, TRYPTOPHAN, ALANINE, ARGININE, GLYCINE, PROLINE, GLUTAMIC ACID, SERINE, ASPARTIC ACID, TYROSINE
1.18; 1.04; 1.04; 960; 894; 749; 749; 749; 250; 2.17; 1.47; 1.04; 894; 749; 592; 434; 39 INJECTION, SOLUTION INTRAVENOUS at 22:43

## 2024-01-04 RX ADMIN — SODIUM CHLORIDE, PRESERVATIVE FREE 10 ML: 5 INJECTION INTRAVENOUS at 08:00

## 2024-01-04 RX ADMIN — SODIUM CHLORIDE, PRESERVATIVE FREE 2 ML: 5 INJECTION INTRAVENOUS at 22:46

## 2024-01-04 RX ADMIN — METRONIDAZOLE 500 MG: 500 INJECTION, SOLUTION INTRAVENOUS at 05:23

## 2024-01-04 RX ADMIN — ENOXAPARIN SODIUM 40 MG: 100 INJECTION SUBCUTANEOUS at 08:00

## 2024-01-04 RX ADMIN — SOYBEAN OIL 250 ML: 20 INJECTION, SOLUTION INTRAVENOUS at 22:46

## 2024-01-04 RX ADMIN — METRONIDAZOLE 500 MG: 500 INJECTION, SOLUTION INTRAVENOUS at 21:06

## 2024-01-04 RX ADMIN — SODIUM CHLORIDE, PRESERVATIVE FREE 10 ML: 5 INJECTION INTRAVENOUS at 08:04

## 2024-01-04 RX ADMIN — SODIUM CHLORIDE, PRESERVATIVE FREE 2 ML: 5 INJECTION INTRAVENOUS at 08:04

## 2024-01-04 ASSESSMENT — PAIN SCALES - GENERAL
PAINLEVEL_OUTOF10: 0
PAINLEVEL_OUTOF10: 0

## 2024-01-05 ENCOUNTER — APPOINTMENT (OUTPATIENT)
Dept: CARDIOLOGY | Age: 74
DRG: 384 | End: 2024-01-05
Attending: NURSE PRACTITIONER

## 2024-01-05 VITALS
DIASTOLIC BLOOD PRESSURE: 61 MMHG | RESPIRATION RATE: 16 BRPM | OXYGEN SATURATION: 94 % | HEIGHT: 72 IN | WEIGHT: 169.75 LBS | BODY MASS INDEX: 22.99 KG/M2 | HEART RATE: 78 BPM | TEMPERATURE: 98.6 F | SYSTOLIC BLOOD PRESSURE: 125 MMHG

## 2024-01-05 LAB
ANION GAP SERPL CALC-SCNC: 10 MMOL/L (ref 7–19)
BUN SERPL-MCNC: 57 MG/DL (ref 6–20)
BUN/CREAT SERPL: 40 (ref 7–25)
CALCIUM SERPL-MCNC: 8.6 MG/DL (ref 8.4–10.2)
CHLORIDE SERPL-SCNC: 107 MMOL/L (ref 97–110)
CO2 SERPL-SCNC: 28 MMOL/L (ref 21–32)
CREAT SERPL-MCNC: 1.43 MG/DL (ref 0.67–1.17)
EGFRCR SERPLBLD CKD-EPI 2021: 52 ML/MIN/{1.73_M2}
FASTING DURATION TIME PATIENT: ABNORMAL H
GLUCOSE BLDC GLUCOMTR-MCNC: 170 MG/DL (ref 70–99)
GLUCOSE SERPL-MCNC: 175 MG/DL (ref 70–99)
MAGNESIUM SERPL-MCNC: 1.9 MG/DL (ref 1.7–2.4)
PHOSPHATE SERPL-MCNC: 3.4 MG/DL (ref 2.4–4.7)
POTASSIUM SERPL-SCNC: 3.9 MMOL/L (ref 3.4–5.1)
SODIUM SERPL-SCNC: 141 MMOL/L (ref 135–145)

## 2024-01-05 PROCEDURE — 84100 ASSAY OF PHOSPHORUS: CPT

## 2024-01-05 PROCEDURE — 99233 SBSQ HOSP IP/OBS HIGH 50: CPT

## 2024-01-05 PROCEDURE — 10002800 HB RX 250 W HCPCS

## 2024-01-05 PROCEDURE — C9113 INJ PANTOPRAZOLE SODIUM, VIA: HCPCS

## 2024-01-05 PROCEDURE — 83735 ASSAY OF MAGNESIUM: CPT

## 2024-01-05 PROCEDURE — 10004651 HB RX, NO CHARGE ITEM: Performed by: FAMILY MEDICINE

## 2024-01-05 PROCEDURE — 10002801 HB RX 250 W/O HCPCS: Performed by: FAMILY MEDICINE

## 2024-01-05 PROCEDURE — 10002800 HB RX 250 W HCPCS: Performed by: FAMILY MEDICINE

## 2024-01-05 PROCEDURE — 99239 HOSP IP/OBS DSCHRG MGMT >30: CPT | Performed by: FAMILY MEDICINE

## 2024-01-05 PROCEDURE — 96372 THER/PROPH/DIAG INJ SC/IM: CPT | Performed by: FAMILY MEDICINE

## 2024-01-05 PROCEDURE — 80048 BASIC METABOLIC PNL TOTAL CA: CPT

## 2024-01-05 RX ORDER — OMEPRAZOLE 40 MG/1
40 CAPSULE, DELAYED RELEASE ORAL 2 TIMES DAILY
Qty: 60 CAPSULE | Refills: 1 | Status: SHIPPED | OUTPATIENT
Start: 2024-01-05 | End: 2024-03-05

## 2024-01-05 RX ADMIN — SODIUM CHLORIDE, PRESERVATIVE FREE 2 ML: 5 INJECTION INTRAVENOUS at 08:34

## 2024-01-05 RX ADMIN — METRONIDAZOLE 500 MG: 500 INJECTION, SOLUTION INTRAVENOUS at 06:04

## 2024-01-05 RX ADMIN — SODIUM CHLORIDE, PRESERVATIVE FREE 10 ML: 5 INJECTION INTRAVENOUS at 08:33

## 2024-01-05 RX ADMIN — SODIUM CHLORIDE, PRESERVATIVE FREE 10 ML: 5 INJECTION INTRAVENOUS at 08:34

## 2024-01-05 RX ADMIN — PANTOPRAZOLE SODIUM 40 MG: 40 INJECTION, POWDER, FOR SOLUTION INTRAVENOUS at 08:29

## 2024-01-05 RX ADMIN — ENOXAPARIN SODIUM 40 MG: 100 INJECTION SUBCUTANEOUS at 08:29

## 2024-01-05 ASSESSMENT — PAIN SCALES - GENERAL: PAINLEVEL_OUTOF10: 0

## 2024-01-10 ENCOUNTER — TELEPHONE (OUTPATIENT)
Dept: CARDIOLOGY | Age: 74
End: 2024-01-10

## 2024-01-12 ENCOUNTER — TELEPHONE (OUTPATIENT)
Dept: CARDIOLOGY | Age: 74
End: 2024-01-12

## 2024-01-15 ENCOUNTER — TELEPHONE (OUTPATIENT)
Dept: GASTROENTEROLOGY | Age: 74
End: 2024-01-15

## 2024-01-15 DIAGNOSIS — K25.9 GASTRIC ULCER, UNSPECIFIED CHRONICITY, UNSPECIFIED WHETHER GASTRIC ULCER HEMORRHAGE OR PERFORATION PRESENT: Primary | ICD-10-CM

## 2024-01-24 ENCOUNTER — OFFICE VISIT (OUTPATIENT)
Dept: CARDIOLOGY | Age: 74
End: 2024-01-24

## 2024-01-24 VITALS
WEIGHT: 178.9 LBS | DIASTOLIC BLOOD PRESSURE: 67 MMHG | HEART RATE: 85 BPM | HEIGHT: 72 IN | SYSTOLIC BLOOD PRESSURE: 141 MMHG | OXYGEN SATURATION: 97 % | BODY MASS INDEX: 24.23 KG/M2

## 2024-01-24 DIAGNOSIS — I48.92 ATRIAL FLUTTER, UNSPECIFIED TYPE (CMD): ICD-10-CM

## 2024-01-24 DIAGNOSIS — K25.3 ACUTE GASTRIC ULCER, UNSPECIFIED WHETHER GASTRIC ULCER HEMORRHAGE OR PERFORATION PRESENT: ICD-10-CM

## 2024-01-24 DIAGNOSIS — N17.9 AKI (ACUTE KIDNEY INJURY) (CMD): ICD-10-CM

## 2024-01-24 DIAGNOSIS — Z09 HOSPITAL DISCHARGE FOLLOW-UP: Primary | ICD-10-CM

## 2024-01-24 PROCEDURE — 99204 OFFICE O/P NEW MOD 45 MIN: CPT | Performed by: NURSE PRACTITIONER

## 2024-01-24 RX ORDER — TAMSULOSIN HYDROCHLORIDE 0.4 MG/1
0.4 CAPSULE ORAL DAILY
COMMUNITY
Start: 2024-01-22

## 2024-01-24 SDOH — HEALTH STABILITY: PHYSICAL HEALTH: ON AVERAGE, HOW MANY MINUTES DO YOU ENGAGE IN EXERCISE AT THIS LEVEL?: 0 MIN

## 2024-01-24 SDOH — HEALTH STABILITY: PHYSICAL HEALTH: ON AVERAGE, HOW MANY DAYS PER WEEK DO YOU ENGAGE IN MODERATE TO STRENUOUS EXERCISE (LIKE A BRISK WALK)?: 0 DAYS

## 2024-01-24 ASSESSMENT — PATIENT HEALTH QUESTIONNAIRE - PHQ9
2. FEELING DOWN, DEPRESSED OR HOPELESS: NOT AT ALL
1. LITTLE INTEREST OR PLEASURE IN DOING THINGS: NOT AT ALL
SUM OF ALL RESPONSES TO PHQ9 QUESTIONS 1 AND 2: 0
SUM OF ALL RESPONSES TO PHQ9 QUESTIONS 1 AND 2: 0
CLINICAL INTERPRETATION OF PHQ2 SCORE: NO FURTHER SCREENING NEEDED

## 2024-02-05 DIAGNOSIS — R31.0 GROSS HEMATURIA: Primary | ICD-10-CM

## 2024-02-08 ENCOUNTER — TELEPHONE (OUTPATIENT)
Dept: CARDIOLOGY | Age: 74
End: 2024-02-08

## 2024-02-13 ENCOUNTER — MED REC SCAN ONLY (OUTPATIENT)
Dept: INTERNAL MEDICINE CLINIC | Facility: CLINIC | Age: 74
End: 2024-02-13

## 2024-02-14 ENCOUNTER — ANESTHESIA EVENT (OUTPATIENT)
Dept: GASTROENTEROLOGY | Age: 74
End: 2024-02-14

## 2024-02-14 ENCOUNTER — HOSPITAL ENCOUNTER (OUTPATIENT)
Dept: GASTROENTEROLOGY | Age: 74
Discharge: HOME | End: 2024-02-14
Attending: HOSPITALIST

## 2024-02-14 ENCOUNTER — ANESTHESIA (OUTPATIENT)
Dept: GASTROENTEROLOGY | Age: 74
End: 2024-02-14

## 2024-02-14 ENCOUNTER — TELEPHONE (OUTPATIENT)
Dept: GASTROENTEROLOGY | Age: 74
End: 2024-02-14

## 2024-02-14 VITALS
RESPIRATION RATE: 18 BRPM | WEIGHT: 186.29 LBS | OXYGEN SATURATION: 99 % | BODY MASS INDEX: 25.23 KG/M2 | SYSTOLIC BLOOD PRESSURE: 167 MMHG | TEMPERATURE: 97.7 F | HEART RATE: 54 BPM | HEIGHT: 72 IN | DIASTOLIC BLOOD PRESSURE: 88 MMHG

## 2024-02-14 DIAGNOSIS — K25.9 GASTRIC ULCER, UNSPECIFIED CHRONICITY, UNSPECIFIED WHETHER GASTRIC ULCER HEMORRHAGE OR PERFORATION PRESENT: Primary | ICD-10-CM

## 2024-02-14 DIAGNOSIS — K25.9 GASTRIC ULCER, UNSPECIFIED CHRONICITY, UNSPECIFIED WHETHER GASTRIC ULCER HEMORRHAGE OR PERFORATION PRESENT: ICD-10-CM

## 2024-02-14 PROCEDURE — 10004451 HB PACU RECOVERY 1ST 30 MINUTES

## 2024-02-14 PROCEDURE — 10002800 HB RX 250 W HCPCS

## 2024-02-14 PROCEDURE — 10002807 HB RX 258: Performed by: HOSPITALIST

## 2024-02-14 PROCEDURE — 10002807 HB RX 258

## 2024-02-14 PROCEDURE — 13000008 HB ANESTHESIA MAC OUTSIDE OR

## 2024-02-14 PROCEDURE — 13000001 HB PHASE II RECOVERY EA 30 MINUTES

## 2024-02-14 PROCEDURE — 13000024 HB GI COMPLEX CASE S/U + 1ST 15 MIN

## 2024-02-14 RX ORDER — SODIUM CHLORIDE 9 MG/ML
INJECTION, SOLUTION INTRAVENOUS CONTINUOUS
Status: DISCONTINUED | OUTPATIENT
Start: 2024-02-14 | End: 2024-02-16 | Stop reason: HOSPADM

## 2024-02-14 RX ORDER — SODIUM CHLORIDE 9 MG/ML
INJECTION, SOLUTION INTRAVENOUS CONTINUOUS PRN
Status: DISCONTINUED | OUTPATIENT
Start: 2024-02-14 | End: 2024-02-14

## 2024-02-14 RX ORDER — MIDAZOLAM HYDROCHLORIDE 1 MG/ML
INJECTION, SOLUTION INTRAMUSCULAR; INTRAVENOUS PRN
Status: DISCONTINUED | OUTPATIENT
Start: 2024-02-14 | End: 2024-02-14

## 2024-02-14 RX ADMIN — SODIUM CHLORIDE: 9 INJECTION, SOLUTION INTRAVENOUS at 13:03

## 2024-02-14 RX ADMIN — MIDAZOLAM HYDROCHLORIDE 2 MG: 1 INJECTION, SOLUTION INTRAMUSCULAR; INTRAVENOUS at 13:12

## 2024-02-14 RX ADMIN — PROPOFOL 50 MCG/KG/MIN: 10 INJECTION, EMULSION INTRAVENOUS at 13:13

## 2024-02-14 RX ADMIN — SODIUM CHLORIDE: 9 INJECTION, SOLUTION INTRAVENOUS at 12:19

## 2024-02-14 SDOH — SOCIAL STABILITY: SOCIAL INSECURITY: HOW OFTEN DOES ANYONE, INCLUDING FAMILY AND FRIENDS, INSULT OR TALK DOWN TO YOU?: NEVER

## 2024-02-14 SDOH — SOCIAL STABILITY: SOCIAL INSECURITY: HOW OFTEN DOES ANYONE, INCLUDING FAMILY AND FRIENDS, SCREAM OR CURSE AT YOU?: NEVER

## 2024-02-14 SDOH — SOCIAL STABILITY: SOCIAL INSECURITY: HOW OFTEN DOES ANYONE, INCLUDING FAMILY AND FRIENDS, PHYSICALLY HURT YOU?: NEVER

## 2024-02-14 SDOH — SOCIAL STABILITY: SOCIAL INSECURITY: HOW OFTEN DOES ANYONE, INCLUDING FAMILY AND FRIENDS, THREATEN YOU WITH HARM?: NEVER

## 2024-02-14 ASSESSMENT — ACTIVITIES OF DAILY LIVING (ADL)
HISTORY OF FALLING IN THE LAST YEAR (PRIOR TO ADMISSION): NO
ADL_BEFORE_ADMISSION: INDEPENDENT
RECENT_DECLINE_ADL: NO
ADL_SHORT_OF_BREATH: NO
SENSORY_SUPPORT_DEVICES: DENTURES
CHRONIC_PAIN_PRESENT: NO
ADL_SCORE: 12
NEEDS_ASSIST: NO

## 2024-02-14 ASSESSMENT — COGNITIVE AND FUNCTIONAL STATUS - GENERAL
ARE YOU BLIND OR DO YOU HAVE SERIOUS DIFFICULTY SEEING, EVEN WHEN WEARING GLASSES: NO
ARE YOU DEAF OR DO YOU HAVE SERIOUS DIFFICULTY  HEARING: NO

## 2024-02-14 ASSESSMENT — PAIN SCALES - WONG BAKER: WONGBAKER_NUMERICALRESPONSE: 0

## 2024-02-14 ASSESSMENT — PAIN SCALES - GENERAL
PAINLEVEL_OUTOF10: 0

## 2024-02-19 LAB
ASR DISCLAIMER: NORMAL
CASE RPRT: NORMAL
CLINICAL INFO: NORMAL
PATH REPORT.FINAL DX SPEC: NORMAL
PATH REPORT.GROSS SPEC: NORMAL

## 2024-02-21 ENCOUNTER — LAB ENCOUNTER (OUTPATIENT)
Dept: LAB | Age: 74
End: 2024-02-21
Attending: INTERNAL MEDICINE
Payer: COMMERCIAL

## 2024-02-21 ENCOUNTER — HOSPITAL ENCOUNTER (OUTPATIENT)
Dept: GENERAL RADIOLOGY | Age: 74
Discharge: HOME | End: 2024-02-21
Attending: INTERNAL MEDICINE
Payer: COMMERCIAL

## 2024-02-21 ENCOUNTER — OFFICE VISIT (OUTPATIENT)
Dept: INTERNAL MEDICINE CLINIC | Facility: CLINIC | Age: 74
End: 2024-02-21

## 2024-02-21 VITALS
DIASTOLIC BLOOD PRESSURE: 76 MMHG | BODY MASS INDEX: 24.24 KG/M2 | HEART RATE: 72 BPM | SYSTOLIC BLOOD PRESSURE: 148 MMHG | HEIGHT: 72 IN | WEIGHT: 179 LBS

## 2024-02-21 DIAGNOSIS — K25.4 GASTRIC ULCER WITH HEMORRHAGE, UNSPECIFIED CHRONICITY: Primary | ICD-10-CM

## 2024-02-21 DIAGNOSIS — I10 ESSENTIAL HYPERTENSION WITH GOAL BLOOD PRESSURE LESS THAN 130/80: ICD-10-CM

## 2024-02-21 DIAGNOSIS — N40.0 BENIGN PROSTATIC HYPERPLASIA WITHOUT LOWER URINARY TRACT SYMPTOMS: ICD-10-CM

## 2024-02-21 DIAGNOSIS — K21.9 GASTROESOPHAGEAL REFLUX DISEASE WITHOUT ESOPHAGITIS: ICD-10-CM

## 2024-02-21 DIAGNOSIS — I49.8 OTHER CARDIAC ARRHYTHMIA: ICD-10-CM

## 2024-02-21 DIAGNOSIS — M25.561 RECURRENT PAIN OF RIGHT KNEE: ICD-10-CM

## 2024-02-21 DIAGNOSIS — N18.30 STAGE 3 CHRONIC KIDNEY DISEASE, UNSPECIFIED WHETHER STAGE 3A OR 3B CKD (HCC): ICD-10-CM

## 2024-02-21 LAB
ALBUMIN SERPL-MCNC: 4 G/DL (ref 3.2–4.8)
ALBUMIN/GLOB SERPL: 1 {RATIO} (ref 1–2)
ALP LIVER SERPL-CCNC: 105 U/L
ALT SERPL-CCNC: <7 U/L
ANION GAP SERPL CALC-SCNC: 7 MMOL/L (ref 0–18)
AST SERPL-CCNC: 11 U/L (ref ?–34)
BASOPHILS # BLD AUTO: 0.04 X10(3) UL (ref 0–0.2)
BASOPHILS NFR BLD AUTO: 0.5 %
BILIRUB SERPL-MCNC: 0.6 MG/DL (ref 0.2–1.1)
BUN BLD-MCNC: 16 MG/DL (ref 9–23)
BUN/CREAT SERPL: 12.9 (ref 10–20)
CALCIUM BLD-MCNC: 9.4 MG/DL (ref 8.7–10.4)
CHLORIDE SERPL-SCNC: 109 MMOL/L (ref 98–112)
CHOLEST SERPL-MCNC: 165 MG/DL (ref ?–200)
CO2 SERPL-SCNC: 28 MMOL/L (ref 21–32)
CREAT BLD-MCNC: 1.24 MG/DL
DEPRECATED RDW RBC AUTO: 46.1 FL (ref 35.1–46.3)
EGFRCR SERPLBLD CKD-EPI 2021: 61 ML/MIN/1.73M2 (ref 60–?)
EOSINOPHIL # BLD AUTO: 0.21 X10(3) UL (ref 0–0.7)
EOSINOPHIL NFR BLD AUTO: 2.8 %
ERYTHROCYTE [DISTWIDTH] IN BLOOD BY AUTOMATED COUNT: 15.9 % (ref 11–15)
FASTING PATIENT LIPID ANSWER: YES
FASTING STATUS PATIENT QL REPORTED: YES
GLOBULIN PLAS-MCNC: 3.9 G/DL (ref 2.8–4.4)
GLUCOSE BLD-MCNC: 110 MG/DL (ref 70–99)
HCT VFR BLD AUTO: 29.5 %
HDLC SERPL-MCNC: 45 MG/DL (ref 40–59)
HGB BLD-MCNC: 8.8 G/DL
IMM GRANULOCYTES # BLD AUTO: 0.02 X10(3) UL (ref 0–1)
IMM GRANULOCYTES NFR BLD: 0.3 %
LDLC SERPL CALC-MCNC: 106 MG/DL (ref ?–100)
LYMPHOCYTES # BLD AUTO: 1.23 X10(3) UL (ref 1–4)
LYMPHOCYTES NFR BLD AUTO: 16.5 %
MCH RBC QN AUTO: 23.9 PG (ref 26–34)
MCHC RBC AUTO-ENTMCNC: 29.8 G/DL (ref 31–37)
MCV RBC AUTO: 80.2 FL
MONOCYTES # BLD AUTO: 0.68 X10(3) UL (ref 0.1–1)
MONOCYTES NFR BLD AUTO: 9.1 %
NEUTROPHILS # BLD AUTO: 5.29 X10 (3) UL (ref 1.5–7.7)
NEUTROPHILS # BLD AUTO: 5.29 X10(3) UL (ref 1.5–7.7)
NEUTROPHILS NFR BLD AUTO: 70.8 %
NONHDLC SERPL-MCNC: 120 MG/DL (ref ?–130)
OSMOLALITY SERPL CALC.SUM OF ELEC: 300 MOSM/KG (ref 275–295)
PLATELET # BLD AUTO: 284 10(3)UL (ref 150–450)
POTASSIUM SERPL-SCNC: 3.9 MMOL/L (ref 3.5–5.1)
PROT SERPL-MCNC: 7.9 G/DL (ref 5.7–8.2)
RBC # BLD AUTO: 3.68 X10(6)UL
SODIUM SERPL-SCNC: 144 MMOL/L (ref 136–145)
T3FREE SERPL-MCNC: 2.82 PG/ML (ref 2.4–4.2)
T4 FREE SERPL-MCNC: 1 NG/DL (ref 0.8–1.7)
TRIGL SERPL-MCNC: 71 MG/DL (ref 30–149)
TSI SER-ACNC: 0.23 MIU/ML (ref 0.55–4.78)
URATE SERPL-MCNC: 9.3 MG/DL
VLDLC SERPL CALC-MCNC: 12 MG/DL (ref 0–30)
WBC # BLD AUTO: 7.5 X10(3) UL (ref 4–11)

## 2024-02-21 PROCEDURE — 84481 FREE ASSAY (FT-3): CPT

## 2024-02-21 PROCEDURE — 3078F DIAST BP <80 MM HG: CPT | Performed by: INTERNAL MEDICINE

## 2024-02-21 PROCEDURE — 84550 ASSAY OF BLOOD/URIC ACID: CPT

## 2024-02-21 PROCEDURE — 1111F DSCHRG MED/CURRENT MED MERGE: CPT | Performed by: INTERNAL MEDICINE

## 2024-02-21 PROCEDURE — 80053 COMPREHEN METABOLIC PANEL: CPT

## 2024-02-21 PROCEDURE — 73562 X-RAY EXAM OF KNEE 3: CPT | Performed by: INTERNAL MEDICINE

## 2024-02-21 PROCEDURE — 3077F SYST BP >= 140 MM HG: CPT | Performed by: INTERNAL MEDICINE

## 2024-02-21 PROCEDURE — 84439 ASSAY OF FREE THYROXINE: CPT

## 2024-02-21 PROCEDURE — 99215 OFFICE O/P EST HI 40 MIN: CPT | Performed by: INTERNAL MEDICINE

## 2024-02-21 PROCEDURE — 80061 LIPID PANEL: CPT

## 2024-02-21 PROCEDURE — 84443 ASSAY THYROID STIM HORMONE: CPT

## 2024-02-21 PROCEDURE — 85025 COMPLETE CBC W/AUTO DIFF WBC: CPT

## 2024-02-21 PROCEDURE — 36415 COLL VENOUS BLD VENIPUNCTURE: CPT

## 2024-02-21 PROCEDURE — 3008F BODY MASS INDEX DOCD: CPT | Performed by: INTERNAL MEDICINE

## 2024-02-21 RX ORDER — OMEPRAZOLE 40 MG/1
CAPSULE, DELAYED RELEASE ORAL
COMMUNITY

## 2024-02-21 RX ORDER — TAMSULOSIN HYDROCHLORIDE 0.4 MG/1
0.8 CAPSULE ORAL NIGHTLY
COMMUNITY

## 2024-02-21 NOTE — PROGRESS NOTES
Subjective:   Patient ID: Ashley Matos is a 74 year old male.  Chief Complaint   Patient presents with    Hospital F/U     Admitted to [facility] on 12/28/23   S/p hopitalisation on   12/28 /23 - patient was in ER [facility]  - due abdominal pain   Was  diagnosed with Gastric ulcers  was  admitted  to hospital for  treatment for almost 5-6 days  Patient  received treatment - had  EGD  and was  bleeding and had bx - no  results available yet .  Dr CHASE Verde  is gastroenterologist    Was asked to f.u in 3  months         Cardiologist patient was seen has holter monitor -\[facility]\"" f/u visit   3/24 for    f/u visit   hospital     Pre-op Diagnosis: GASTRIC ULCER on imaging  Proposed Surgery: EGD    Patient states feeling well otherwise. Denies chest pain, shortnesss of breath, palpitations, or abdominal pain, denies UTI symptoms fever or chills.         Past Medical History:  Diagnosis Date  Difficulty in urination  Enlarged prostate  Essential (primary) hypertension  Gastric ulcer  Gastroesophageal reflux disease  Primary generalized (osteo)arthritis        Diagnosis  Acute gastric ulcer, unspecified whether gastric ulcer hemorrhage or perforation present  SBO (small bowel obstruction) (CMD)  Atrial flutter (CMD)  SETH (acute kidney injury) (CMD)    Current Outpatient Medications  Medication Sig Dispense Refill  tamsulosin (FLOMAX) 0.4 MG Cap Take 0.4 mg by mouth daily.  metoPROLOL tartrate (LOPRESSOR) 25 MG tablet Take 1 tablet by mouth every 12 hours. 60 tablet 3  omeprazole (PriLOSEC) 40 MG capsule Take 1 capsule by mouth in the morning and 1 capsule in the evening. 60 capsule 1  Misc Natural Products (Pumpkin Seed Oil) Cap Take 1 capsule by mouth daily.      ALLERGIES:  No Known Allergies        History/Other:   Review of Systems   Constitutional:  Negative for chills, fatigue and fever.   HENT:  Negative for ear pain and sore throat.    Eyes:  Negative for pain and redness.    Respiratory:  Negative for cough, shortness of breath and wheezing.    Cardiovascular:  Negative for chest pain, palpitations and leg swelling.   Gastrointestinal:  Negative for abdominal pain, constipation, diarrhea, nausea and vomiting.   Genitourinary:  Negative for dysuria and frequency.   Skin:  Negative for pallor.   Neurological:  Negative for dizziness and headaches.   Psychiatric/Behavioral:  Negative for confusion. The patient is not nervous/anxious.      Current Outpatient Medications   Medication Sig Dispense Refill    Omeprazole 40 MG Oral Capsule Delayed Release TAKE 1 CAPSULE BY MOUTH IN THE MORNING AND 1 CAP IN THE EVENING      metoprolol tartrate 25 MG Oral Tab Take 1 tablet (25 mg total) by mouth Q12H.      Misc Natural Products (PUMPKIN SEED OIL OR) Take 1 capsule by mouth daily.      tamsulosin 0.4 MG Oral Cap Take 2 capsules (0.8 mg total) by mouth nightly. TAKE AT BEDTIME       Allergies:No Known Allergies    Objective:   Physical Exam  Vitals and nursing note reviewed.   Constitutional:       General: He is not in acute distress.  HENT:      Head: Normocephalic and atraumatic.      Right Ear: Tympanic membrane, ear canal and external ear normal.      Left Ear: Tympanic membrane, ear canal and external ear normal.      Nose: Nose normal.      Mouth/Throat:      Pharynx: Uvula midline. No oropharyngeal exudate or posterior oropharyngeal erythema.   Eyes:      General: No scleral icterus.        Right eye: No discharge.         Left eye: No discharge.   Cardiovascular:      Rate and Rhythm: Normal rate and regular rhythm.      Heart sounds: Normal heart sounds. No murmur heard.  Pulmonary:      Effort: Pulmonary effort is normal. No respiratory distress.      Breath sounds: Normal breath sounds. No wheezing or rales.   Abdominal:      Palpations: Abdomen is soft. There is no mass.      Tenderness: There is no abdominal tenderness. There is no right CVA tenderness or left CVA tenderness.       Comments: No hepatomegaly, no splenomegaly   Musculoskeletal:      Cervical back: Neck supple.      Right knee: Swelling present. No bony tenderness. Decreased range of motion.      Right lower leg: No edema.      Left lower leg: No edema.   Lymphadenopathy:      Cervical: No cervical adenopathy.   Skin:     General: Skin is warm and dry.   Neurological:      Mental Status: He is alert and oriented to person, place, and time.   Psychiatric:         Behavior: Behavior normal.       Blood pressure 148/76, pulse 72, height 6' (1.829 m), weight 179 lb (81.2 kg).    Assessment & Plan:     1. Gastric ulcer with hemorrhage, unspecified chronicity  Patient is seeing the gastroenterologist patient to complete  Omeprazole 40 mg twice daily  Diet education avoid spicy and acidic avoid NSAIDs ibuprofen  And aspirin-based medications  Avoid alcohol  Patient and his family wife and son-in-law verbalized understanding and compliance follow-up with the gastroenterologist  As scheduled in March 2024  Patient might have CT angiogram abdomen due to severe gastric ulceration with contrast due to patient decreased kidney function not advisable to complete the blood I will leave that up to the gastroenterologist and need for further testing.  Patient otherwise improving  Will complete today blood test to evaluate on the blood counts  - Gastro Referral - In Network    2. Essential hypertension with goal blood pressure less than 130/80  History of atrial flutter  Patient has Holter monitor-was not read yet  Continue with metoprolol 25 mg twice daily  Referred to see the cardiologist as scheduled in [facility] but patient eventually elects to come back in the area since he lives here referral provided to patient to schedule afterwards his cardiology visit.    - Cardio Referral - Internal    3. Benign prostatic hyperplasia without lower urinary tract symptoms  Patient is on tamsulosin 0.5 mg 2 capsules at night  Tolerating medication  well  Stable symptoms continue present management follow-up with urology      4. Gastroesophageal reflux disease without esophagitis  Patient advised to avoid spicy food, coffee, tea, caffeinated drinks, alcohol, acidic food/juices  Advised to avoid NSAID's - Aspirin-based medications  Advised to avoid wearing  tight clothes   Advised to elevate the head of the bed   Avoid eating at least 3 hours before bedtime   Counseling on ideal weight/BMI  Take omeprazole 40 mg twice daily 30-60  minutes before breakfast and dinner   Side effects and directions of medication discussed with patient. Patient verbalized understanding and compliance.    - Gastro Referral - In Network    5. Recurrent pain of right knee  X-ray right knee ordered  Can give Tylenol 500 mg once daily as needed  Labs   Patient seen today in significant pain-cannot be released to work he could not barely stand up from the chair to the table  Recommend patient to use Tylenol as needed and follow-up with physical therapy doctor for further evaluation and treatment  Patient also to discuss for more regarding going back to work.  With his gastroenterologist and if any bothersome still with physiatry.    6. Stage 3 chronic kidney disease, unspecified whether stage 3a or 3b CKD (HCC)  Labs to complete today  Last GFR was in 50s  Keep with good water hydration and avoid NSAIDs      7. Other cardiac arrhythmia  History in the hospital of a flutter patient has Holter monitor to follow-up with his cardiologist continue metoprolol titrate 25 mg    1 tablet twice daily  - Cardio Referral - Internal anemia likely due to bleeding -gastric  Last hemoglobin on 12/31   -8.0  --8.4 -11/24  Labs today to complete    If any persistent worsening symptoms patient to go to emergency room         Orders Placed This Encounter   Procedures    Comp Metabolic Panel (14)    CBC With Differential With Platelet    Lipid Panel    TSH W Reflex To Free T4    Uric Acid       Meds This  Visit:  Requested Prescriptions      No prescriptions requested or ordered in this encounter       Imaging & Referrals:  GASTRO - INTERNAL  CARDIO - INTERNAL  PHYSIATRY - INTERNAL

## 2024-02-22 DIAGNOSIS — M25.561 ACUTE PAIN OF RIGHT KNEE: Primary | ICD-10-CM

## 2024-02-24 ENCOUNTER — APPOINTMENT (OUTPATIENT)
Dept: ULTRASOUND IMAGING | Age: 74
End: 2024-02-24
Attending: UROLOGY

## 2024-02-24 ENCOUNTER — HOSPITAL ENCOUNTER (OUTPATIENT)
Dept: LAB | Age: 74
Discharge: HOME | End: 2024-02-24

## 2024-02-24 DIAGNOSIS — N40.1 BENIGN PROSTATIC HYPERPLASIA WITH LOWER URINARY TRACT SYMPTOMS: ICD-10-CM

## 2024-02-24 DIAGNOSIS — N40.1 BENIGN PROSTATIC HYPERPLASIA WITH LOWER URINARY TRACT SYMPTOMS: Primary | ICD-10-CM

## 2024-02-24 LAB — PSA SERPL-MCNC: 23.2 NG/ML

## 2024-02-24 PROCEDURE — 36415 COLL VENOUS BLD VENIPUNCTURE: CPT | Performed by: UROLOGY

## 2024-02-24 PROCEDURE — 84153 ASSAY OF PSA TOTAL: CPT | Performed by: UROLOGY

## 2024-03-04 DIAGNOSIS — K25.9 GASTRIC ULCER, UNSPECIFIED CHRONICITY, UNSPECIFIED WHETHER GASTRIC ULCER HEMORRHAGE OR PERFORATION PRESENT: Primary | ICD-10-CM

## 2024-03-13 ENCOUNTER — APPOINTMENT (OUTPATIENT)
Dept: CARDIOLOGY | Age: 74
End: 2024-03-13

## 2024-03-18 RX ORDER — OMEPRAZOLE 40 MG/1
40 CAPSULE, DELAYED RELEASE ORAL 2 TIMES DAILY
Qty: 180 CAPSULE | Refills: 1 | Status: SHIPPED | OUTPATIENT
Start: 2024-03-18 | End: 2024-09-14

## 2024-04-04 ENCOUNTER — HOSPITAL ENCOUNTER (OUTPATIENT)
Dept: ULTRASOUND IMAGING | Age: 74
Discharge: HOME | End: 2024-04-04
Attending: UROLOGY

## 2024-04-04 DIAGNOSIS — R31.0 GROSS HEMATURIA: ICD-10-CM

## 2024-04-04 PROCEDURE — 76775 US EXAM ABDO BACK WALL LIM: CPT

## 2024-04-17 ENCOUNTER — OFFICE VISIT (OUTPATIENT)
Dept: INTERNAL MEDICINE CLINIC | Facility: CLINIC | Age: 74
End: 2024-04-17
Payer: COMMERCIAL

## 2024-04-17 VITALS
HEART RATE: 59 BPM | WEIGHT: 180 LBS | BODY MASS INDEX: 24.38 KG/M2 | DIASTOLIC BLOOD PRESSURE: 70 MMHG | HEIGHT: 72 IN | SYSTOLIC BLOOD PRESSURE: 127 MMHG

## 2024-04-17 DIAGNOSIS — I10 ESSENTIAL HYPERTENSION WITH GOAL BLOOD PRESSURE LESS THAN 130/80: Primary | ICD-10-CM

## 2024-04-17 DIAGNOSIS — K21.9 GASTROESOPHAGEAL REFLUX DISEASE WITHOUT ESOPHAGITIS: ICD-10-CM

## 2024-04-17 DIAGNOSIS — R73.03 PREDIABETES: ICD-10-CM

## 2024-04-17 DIAGNOSIS — D50.8 OTHER IRON DEFICIENCY ANEMIA: ICD-10-CM

## 2024-04-17 PROCEDURE — 3008F BODY MASS INDEX DOCD: CPT | Performed by: INTERNAL MEDICINE

## 2024-04-17 PROCEDURE — 3078F DIAST BP <80 MM HG: CPT | Performed by: INTERNAL MEDICINE

## 2024-04-17 PROCEDURE — G2211 COMPLEX E/M VISIT ADD ON: HCPCS | Performed by: INTERNAL MEDICINE

## 2024-04-17 PROCEDURE — 99215 OFFICE O/P EST HI 40 MIN: CPT | Performed by: INTERNAL MEDICINE

## 2024-04-17 PROCEDURE — 3074F SYST BP LT 130 MM HG: CPT | Performed by: INTERNAL MEDICINE

## 2024-04-17 NOTE — PROGRESS NOTES
Subjective:   Patient ID: Ashley Matos is a 74 year old male.  Chief Complaint   Patient presents with    Hypertension     Last labs completed on 2/21/24     Patient presents today for second  f/u visit gastric ulcer /bleed   Patient is limited with language she speaks French and his daughter is on the phone interpreting-    Patient states feels much better , he is eating well and denies any stomach  pains  denies any nausea ,vomiting ,diarrhea ,constipation or blood in stool.      Patient states he has been working without difficulties just he  chronic r knee pain improved ,but not taking any ibuprofen or aspirin-based medications.  He takes Tylenol as needed for pain.    Patient states feeling well otherwise. Pt also Denies chest pain, shortnesss of breath, palpitations  -taking Metoprolol  25 mg daily   Denies abdominal pain, denies UTI symptoms fever or chills .         Past Medical History:  Diagnosis Date  Difficulty in urination  Enlarged prostate  Essential (primary) hypertension  Gastric ulcer  Gastroesophageal reflux disease  Primary generalized (osteo)arthritis        Diagnosis  Acute gastric ulcer, unspecified whether gastric ulcer hemorrhage or perforation present  SBO (small bowel obstruction) (CMD)  Atrial flutter (CMD)  SETH (acute kidney injury) (CMD)    Current Outpatient Medications  Medication Sig Dispense Refill  tamsulosin (FLOMAX) 0.4 MG Cap Take 0.4 mg by mouth daily.  metoPROLOL tartrate (LOPRESSOR) 25 MG tablet Take 1 tablet by mouth every 12 hours. 60 tablet 3  omeprazole (PriLOSEC) 40 MG capsule Take 1 capsule by mouth in the morning and 1 capsule in the evening. 60 capsule 1  Misc Natural Products (Pumpkin Seed Oil) Cap Take 1 capsule by mouth daily.      ALLERGIES:  No Known Allergies        History/Other:   Review of Systems   Constitutional:  Negative for chills, fatigue and fever.   HENT:  Negative for ear pain and sore throat.    Eyes:  Negative for pain and redness.    Respiratory:  Negative for cough, shortness of breath and wheezing.    Cardiovascular:  Negative for chest pain, palpitations and leg swelling.   Gastrointestinal:  Negative for abdominal pain, anal bleeding, blood in stool, constipation, diarrhea, nausea, rectal pain and vomiting.        Denies heartburns   Genitourinary:  Negative for dysuria and frequency.   Skin:  Negative for pallor.   Neurological:  Negative for dizziness and headaches.   Psychiatric/Behavioral:  Negative for confusion. The patient is not nervous/anxious.      Current Outpatient Medications   Medication Sig Dispense Refill    Omeprazole 40 MG Oral Capsule Delayed Release TAKE 1 CAPSULE BY MOUTH IN THE MORNING AND 1 CAP IN THE EVENING      metoprolol tartrate 25 MG Oral Tab Take 1 tablet (25 mg total) by mouth Q12H.      Misc Natural Products (PUMPKIN SEED OIL OR) Take 1 capsule by mouth daily.      tamsulosin 0.4 MG Oral Cap Take 2 capsules (0.8 mg total) by mouth nightly. TAKE AT BEDTIME       Allergies:No Known Allergies    Objective:   Physical Exam  Vitals and nursing note reviewed.   Constitutional:       General: He is not in acute distress.  HENT:      Head: Normocephalic and atraumatic.   Eyes:      General: No scleral icterus.        Right eye: No discharge.         Left eye: No discharge.   Cardiovascular:      Rate and Rhythm: Normal rate and regular rhythm.      Heart sounds: Normal heart sounds. No murmur heard.  Pulmonary:      Effort: Pulmonary effort is normal. No respiratory distress.      Breath sounds: Normal breath sounds. No wheezing or rales.   Abdominal:      Palpations: Abdomen is soft. There is no mass.      Tenderness: There is no abdominal tenderness. There is no right CVA tenderness or left CVA tenderness.      Comments: No hepatomegaly, no splenomegaly   Musculoskeletal:      Cervical back: Neck supple.      Right knee: No swelling or bony tenderness. Decreased range of motion.      Right lower leg: No edema.       Left lower leg: No edema.   Lymphadenopathy:      Cervical: No cervical adenopathy.   Skin:     General: Skin is warm and dry.   Neurological:      Mental Status: He is alert and oriented to person, place, and time.   Psychiatric:         Behavior: Behavior normal.       Blood pressure 127/70, pulse 59, height 6' (1.829 m), weight 180 lb (81.6 kg).    Assessment & Plan:     1. Gastric ulcer with hemorrhage, unspecified chronicity-stable now  Patient is seeing the gastroenterologist patient to complete  Omeprazole 40 mg twice daily--- then once daily  Diet education avoid spicy and acidic avoid NSAIDs ibuprofen  And aspirin-based medications  Avoid alcohol  follow-up with the gastroenterologist   Patient otherwise improving  Will complete today blood test to evaluate on the blood counts   - Gastro Referral - In Network  ,labs to complete      Essential hypertension with goal blood pressure less than 130/80  History of atrial flutter  Patient has Holter monitor-showers occasional  PSVT   Continue with metoprolol 25 mg twice daily   F/u with  cardiologist   - Cardio Referral - Internal     Benign prostatic hyperplasia without lower urinary tract symptoms  Patient is on tamsulosin 0.5 mg 2 capsules at night  Tolerating medication well  Stable symptoms continue present management follow-up with urology  Fluids   Stable cpm      Gastroesophageal reflux disease without esophagitis  Patient advised to avoid spicy food, coffee, tea, caffeinated drinks, alcohol, acidic food/juices  Advised to avoid NSAID's - Aspirin-based medications  Advised to avoid wearing  tight clothes   Advised to elevate the head of the bed   Avoid eating at least 3 hours before bedtime   Counseling on ideal weight/BMI  Take omeprazole 40 mg twice daily 30-60  minutes before breakfast and dinner   Side effects and directions of medication discussed with patient. Patient verbalized understanding and compliance.    Stable cpm   Labs   - Gastro Referral  - In Network    5. Right knee pain - improved   Elevated uric acid 9.3   Referred to Rheumatology   X-ray right knee - moderate fluid    Can give Tylenol 500 mg once daily for now  as needed  Avoid salty food and red meat rich food ,dry meat ,saltines..   Stable cpm         6. Stage 3 chronic kidney disease, unspecified whether stage 3a or 3b CKD (HCC)  Labs to complete today  Last GFR was in 50s  Keep with good water hydration and avoid NSAIDs       Anemia   likely due to bleeding -gastric  Last hemoglobin on 12/31   -8.0  --8.4 -11/24  Labs today to complete    If any persistent worsening symptoms patient to go to emergency room         Orders Placed This Encounter   Procedures    Comp Metabolic Panel (14)    CBC With Differential With Platelet    TSH W Reflex To Free T4       Meds This Visit:  Requested Prescriptions      No prescriptions requested or ordered in this encounter       Imaging & Referrals:  None

## 2024-04-18 DIAGNOSIS — R97.20 ELEVATED PROSTATE SPECIFIC ANTIGEN (PSA): Primary | ICD-10-CM

## 2024-05-01 ENCOUNTER — HOSPITAL ENCOUNTER (OUTPATIENT)
Dept: GASTROENTEROLOGY | Age: 74
Discharge: HOME | End: 2024-05-01
Attending: HOSPITALIST

## 2024-05-01 ENCOUNTER — TELEPHONE (OUTPATIENT)
Dept: GASTROENTEROLOGY | Age: 74
End: 2024-05-01

## 2024-05-13 ENCOUNTER — LAB ENCOUNTER (OUTPATIENT)
Dept: LAB | Age: 74
End: 2024-05-13
Attending: INTERNAL MEDICINE

## 2024-05-13 DIAGNOSIS — R73.03 PREDIABETES: ICD-10-CM

## 2024-05-13 DIAGNOSIS — D50.8 OTHER IRON DEFICIENCY ANEMIA: ICD-10-CM

## 2024-05-13 DIAGNOSIS — I10 ESSENTIAL HYPERTENSION WITH GOAL BLOOD PRESSURE LESS THAN 130/80: ICD-10-CM

## 2024-05-13 LAB
ALBUMIN SERPL-MCNC: 4.1 G/DL (ref 3.2–4.8)
ALBUMIN/GLOB SERPL: 1.2 {RATIO} (ref 1–2)
ALP LIVER SERPL-CCNC: 112 U/L
ALT SERPL-CCNC: 8 U/L
ANION GAP SERPL CALC-SCNC: 7 MMOL/L (ref 0–18)
AST SERPL-CCNC: 13 U/L (ref ?–34)
BASOPHILS # BLD AUTO: 0.05 X10(3) UL (ref 0–0.2)
BASOPHILS NFR BLD AUTO: 0.9 %
BILIRUB SERPL-MCNC: 0.3 MG/DL (ref 0.2–1.1)
BUN BLD-MCNC: 23 MG/DL (ref 9–23)
BUN/CREAT SERPL: 16.1 (ref 10–20)
CALCIUM BLD-MCNC: 9 MG/DL (ref 8.7–10.4)
CHLORIDE SERPL-SCNC: 113 MMOL/L (ref 98–112)
CO2 SERPL-SCNC: 26 MMOL/L (ref 21–32)
CREAT BLD-MCNC: 1.43 MG/DL
DEPRECATED RDW RBC AUTO: 43.3 FL (ref 35.1–46.3)
EGFRCR SERPLBLD CKD-EPI 2021: 52 ML/MIN/1.73M2 (ref 60–?)
EOSINOPHIL # BLD AUTO: 0.43 X10(3) UL (ref 0–0.7)
EOSINOPHIL NFR BLD AUTO: 7.8 %
ERYTHROCYTE [DISTWIDTH] IN BLOOD BY AUTOMATED COUNT: 15.9 % (ref 11–15)
EST. AVERAGE GLUCOSE BLD GHB EST-MCNC: 134 MG/DL (ref 68–126)
FASTING STATUS PATIENT QL REPORTED: YES
GLOBULIN PLAS-MCNC: 3.5 G/DL (ref 2–3.5)
GLUCOSE BLD-MCNC: 101 MG/DL (ref 70–99)
HBA1C MFR BLD: 6.3 % (ref ?–5.7)
HCT VFR BLD AUTO: 31.1 %
HGB BLD-MCNC: 9.2 G/DL
IMM GRANULOCYTES # BLD AUTO: 0.01 X10(3) UL (ref 0–1)
IMM GRANULOCYTES NFR BLD: 0.2 %
LYMPHOCYTES # BLD AUTO: 1.52 X10(3) UL (ref 1–4)
LYMPHOCYTES NFR BLD AUTO: 27.7 %
MCH RBC QN AUTO: 22.4 PG (ref 26–34)
MCHC RBC AUTO-ENTMCNC: 29.6 G/DL (ref 31–37)
MCV RBC AUTO: 75.7 FL
MONOCYTES # BLD AUTO: 0.39 X10(3) UL (ref 0.1–1)
MONOCYTES NFR BLD AUTO: 7.1 %
NEUTROPHILS # BLD AUTO: 3.08 X10 (3) UL (ref 1.5–7.7)
NEUTROPHILS # BLD AUTO: 3.08 X10(3) UL (ref 1.5–7.7)
NEUTROPHILS NFR BLD AUTO: 56.3 %
OSMOLALITY SERPL CALC.SUM OF ELEC: 306 MOSM/KG (ref 275–295)
PLATELET # BLD AUTO: 273 10(3)UL (ref 150–450)
POTASSIUM SERPL-SCNC: 4.3 MMOL/L (ref 3.5–5.1)
PROT SERPL-MCNC: 7.6 G/DL (ref 5.7–8.2)
RBC # BLD AUTO: 4.11 X10(6)UL
SODIUM SERPL-SCNC: 146 MMOL/L (ref 136–145)
TSI SER-ACNC: 0.7 MIU/ML (ref 0.55–4.78)
WBC # BLD AUTO: 5.5 X10(3) UL (ref 4–11)

## 2024-05-13 PROCEDURE — 80053 COMPREHEN METABOLIC PANEL: CPT

## 2024-05-13 PROCEDURE — 83036 HEMOGLOBIN GLYCOSYLATED A1C: CPT

## 2024-05-13 PROCEDURE — 36415 COLL VENOUS BLD VENIPUNCTURE: CPT

## 2024-05-13 PROCEDURE — 84443 ASSAY THYROID STIM HORMONE: CPT

## 2024-05-13 PROCEDURE — 85025 COMPLETE CBC W/AUTO DIFF WBC: CPT

## 2024-05-14 ENCOUNTER — E-ADVICE (OUTPATIENT)
Dept: GASTROENTEROLOGY | Age: 74
End: 2024-05-14

## 2024-07-03 ENCOUNTER — OFFICE VISIT (OUTPATIENT)
Dept: INTERNAL MEDICINE CLINIC | Facility: CLINIC | Age: 74
End: 2024-07-03
Payer: COMMERCIAL

## 2024-07-03 VITALS
HEART RATE: 60 BPM | SYSTOLIC BLOOD PRESSURE: 165 MMHG | BODY MASS INDEX: 25.33 KG/M2 | HEIGHT: 72 IN | DIASTOLIC BLOOD PRESSURE: 76 MMHG | WEIGHT: 187 LBS

## 2024-07-03 DIAGNOSIS — K21.9 GASTROESOPHAGEAL REFLUX DISEASE WITHOUT ESOPHAGITIS: ICD-10-CM

## 2024-07-03 DIAGNOSIS — M25.561 PAIN IN BOTH KNEES, UNSPECIFIED CHRONICITY: ICD-10-CM

## 2024-07-03 DIAGNOSIS — I10 ESSENTIAL HYPERTENSION WITH GOAL BLOOD PRESSURE LESS THAN 130/80: Primary | ICD-10-CM

## 2024-07-03 DIAGNOSIS — E55.9 VITAMIN D DEFICIENCY: ICD-10-CM

## 2024-07-03 DIAGNOSIS — M25.562 PAIN IN BOTH KNEES, UNSPECIFIED CHRONICITY: ICD-10-CM

## 2024-07-03 PROCEDURE — 3008F BODY MASS INDEX DOCD: CPT | Performed by: INTERNAL MEDICINE

## 2024-07-03 PROCEDURE — 3078F DIAST BP <80 MM HG: CPT | Performed by: INTERNAL MEDICINE

## 2024-07-03 PROCEDURE — 3077F SYST BP >= 140 MM HG: CPT | Performed by: INTERNAL MEDICINE

## 2024-07-03 PROCEDURE — 99215 OFFICE O/P EST HI 40 MIN: CPT | Performed by: INTERNAL MEDICINE

## 2024-07-03 NOTE — PROGRESS NOTES
Subjective:   Patient ID: Ashley Matos is a 74 year old male.  Chief Complaint   Patient presents with    Hypertension       Patient is limited with language he speaks [place]ian and his daughter is on the phone interpreting-    Patient states feels much better , he is eating well and denies any stomach  pains  denies any nausea ,vomiting ,diarrhea ,constipation or blood in stool.      Patient states he has been working without difficulties   just he has  chronic r knee pain improved ,but not taking any ibuprofen or aspirin-based medications.  He takes Tylenol as needed for pain.    Patient states feeling well otherwise. Pt also Denies chest pain, shortnesss of breath, palpitations    He state did not take for some time -run out of refill   Metoprolol  25 mg daily  Bp elevated today    Denies abdominal pain, denies UTI symptoms fever or chills .         Past Medical History:  Diagnosis Date  Difficulty in urination  Enlarged prostate  Essential (primary) hypertension  Gastric ulcer  Gastroesophageal reflux disease  Primary generalized (osteo)arthritis        Diagnosis  Acute gastric ulcer, unspecified whether gastric ulcer hemorrhage or perforation present  SBO (small bowel obstruction) (CMD)  Atrial flutter (CMD)  SETH (acute kidney injury) (CMD)    Current Outpatient Medications  Medication Sig Dispense Refill  tamsulosin (FLOMAX) 0.4 MG Cap Take 0.4 mg by mouth daily.  metoPROLOL tartrate (LOPRESSOR) 25 MG tablet Take 1 tablet by mouth every 12 hours. 60 tablet 3  omeprazole (PriLOSEC) 40 MG capsule Take 1 capsule by mouth in the morning and 1 capsule in the evening. 60 capsule 1  Misc Natural Products (Pumpkin Seed Oil) Cap Take 1 capsule by mouth daily.      ALLERGIES:  No Known Allergies        History/Other:   Review of Systems   Constitutional:  Negative for chills, fatigue and fever.   HENT:  Negative for ear pain and sore throat.    Eyes:  Negative for pain and redness.   Respiratory:  Negative for cough,  shortness of breath and wheezing.    Cardiovascular:  Negative for chest pain, palpitations and leg swelling.   Gastrointestinal:  Negative for abdominal pain, anal bleeding, blood in stool, constipation, diarrhea, nausea, rectal pain and vomiting.        Denies heartburns   Genitourinary:  Negative for dysuria and frequency.   Skin:  Negative for pallor.   Neurological:  Negative for dizziness and headaches.   Psychiatric/Behavioral:  Negative for confusion. The patient is not nervous/anxious.      Current Outpatient Medications   Medication Sig Dispense Refill    metoprolol tartrate 25 MG Oral Tab Take 1 tablet (25 mg total) by mouth Q12H. 180 tablet 0    Omeprazole 40 MG Oral Capsule Delayed Release TAKE 1 CAPSULE BY MOUTH IN THE MORNING AND 1 CAP IN THE EVENING      Misc Natural Products (PUMPKIN SEED OIL OR) Take 1 capsule by mouth daily.      tamsulosin 0.4 MG Oral Cap Take 2 capsules (0.8 mg total) by mouth nightly. TAKE AT BEDTIME       Allergies:No Known Allergies    Objective:   Physical Exam  Vitals and nursing note reviewed.   Constitutional:       General: He is not in acute distress.  HENT:      Head: Normocephalic and atraumatic.   Eyes:      General: No scleral icterus.        Right eye: No discharge.         Left eye: No discharge.   Cardiovascular:      Rate and Rhythm: Normal rate and regular rhythm.      Heart sounds: Normal heart sounds. No murmur heard.  Pulmonary:      Effort: Pulmonary effort is normal. No respiratory distress.      Breath sounds: Normal breath sounds. No wheezing or rales.   Abdominal:      Palpations: Abdomen is soft. There is no mass.      Tenderness: There is no abdominal tenderness. There is no right CVA tenderness or left CVA tenderness.      Comments: No hepatomegaly, no splenomegaly   Musculoskeletal:      Cervical back: Neck supple.      Right knee: Swelling (mild  erythema tendern to touch - gout ?) and bony tenderness present. No erythema or crepitus. Decreased  range of motion.      Right lower leg: No edema.      Left lower leg: No edema.   Lymphadenopathy:      Cervical: No cervical adenopathy.   Skin:     General: Skin is warm and dry.   Neurological:      Mental Status: He is alert and oriented to person, place, and time.   Psychiatric:         Behavior: Behavior normal.       Blood pressure (!) 165/76, pulse 60, height 6' (1.829 m), weight 187 lb (84.8 kg).    Assessment & Plan:       Essential hypertension with goal blood pressure less than 130/80  History of atrial flutter  Patient has Holter monitor-showers occasional  PSVT   Continue with metoprolol 25 mg twice daily  -restart   Send  refill   F/u with  cardiologist soon   - Cardio Referral - Internal     Benign prostatic hyperplasia without lower urinary tract symptoms  Patient is on tamsulosin 0.5 mg 2 capsules at night  Tolerating medication well  Stable symptoms continue present management follow-up with urology  Fluids   Stable cpm      Gastroesophageal reflux disease without esophagitis  Patient advised to avoid spicy food, coffee, tea, caffeinated drinks, alcohol, acidic food/juices  Advised to avoid NSAID's - Aspirin-based medications  Advised to avoid wearing  tight clothes   Advised to elevate the head of the bed   Avoid eating at least 3 hours before bedtime   Counseling on ideal weight/BMI  Take omeprazole 40 mg twice daily 30-60  minutes before breakfast and dinner   Side effects and directions of medication discussed with patient. Patient verbalized understanding and compliance.    Stable cpm   Labs     - Gastro Referral - has apt  8/19      Right knee pain - improved   Elevated uric acid 9.3   Gout likely   Referred to Rheumatology   - Dr Kayode KHALIL   Recommeded to schedule apt  soon !!!!   X-ray right knee - moderate fluid    Can give Tylenol 500 mg once daily for now  as needed   Start allopurinol when stable and pain medication - hx  bleeding ulcer   Avoid nsaids   Avoid salty food and red meat  rich food ,dry meat ,saltines..   Stable cpm               Stage 3 chronic kidney disease, unspecified whether stage 3a or 3b CKD (HCC)  Labs to complete today  Last GFR was in 50s  Keep with good water hydration and avoid NSAIDs       Anemia   likely due to bleeding -gastric  Last hemoglobin on 12/31   -8.0--  5/24 -9.2   Labs today to complete  Hematology - has apt  with Dr Barrett -7/24   If any persistent worsening symptoms patient to go to emergency room         Total time spent caring for the patient on the day of the encounter:  40 min  This includes pre-charting, reviewing and obtaining results, exam, plan, notes, and counseling.      Orders Placed This Encounter   Procedures    Sed Rate, Westergren (Automated)    C-RP/High Sensitivity    Uric Acid    Comp Metabolic Panel (14)    CBC With Differential With Platelet    TSH W Reflex To Free T4    Rheumatoid Arthritis Factor [E]    Connective Tissue Disease (PARTHA) Screen, Reflex Specific Antibody    Cyclic Citrullinate Pep. IGG [E]    Vitamin D       Meds This Visit:  Requested Prescriptions     Signed Prescriptions Disp Refills    metoprolol tartrate 25 MG Oral Tab 180 tablet 0     Sig: Take 1 tablet (25 mg total) by mouth Q12H.       Imaging & Referrals:  RHEUMATOLOGY - INTERNAL

## 2024-07-20 ENCOUNTER — HOSPITAL ENCOUNTER (OUTPATIENT)
Age: 74
End: 2024-07-20
Payer: COMMERCIAL

## 2024-07-20 ENCOUNTER — LAB ENCOUNTER (OUTPATIENT)
Dept: LAB | Age: 74
End: 2024-07-20
Attending: INTERNAL MEDICINE
Payer: COMMERCIAL

## 2024-07-20 DIAGNOSIS — N18.30 STAGE 3 CHRONIC KIDNEY DISEASE, UNSPECIFIED WHETHER STAGE 3A OR 3B CKD (HCC): ICD-10-CM

## 2024-07-20 DIAGNOSIS — M25.562 PAIN IN BOTH KNEES, UNSPECIFIED CHRONICITY: ICD-10-CM

## 2024-07-20 DIAGNOSIS — I10 ESSENTIAL HYPERTENSION WITH GOAL BLOOD PRESSURE LESS THAN 130/80: ICD-10-CM

## 2024-07-20 DIAGNOSIS — M25.561 PAIN IN BOTH KNEES, UNSPECIFIED CHRONICITY: ICD-10-CM

## 2024-07-20 DIAGNOSIS — K25.4 GASTRIC ULCER WITH HEMORRHAGE, UNSPECIFIED CHRONICITY: ICD-10-CM

## 2024-07-20 DIAGNOSIS — D50.8 OTHER IRON DEFICIENCY ANEMIA: ICD-10-CM

## 2024-07-20 DIAGNOSIS — E55.9 VITAMIN D DEFICIENCY: ICD-10-CM

## 2024-07-20 LAB
ALBUMIN SERPL-MCNC: 4.3 G/DL (ref 3.2–4.8)
ALBUMIN/GLOB SERPL: 1.3 {RATIO} (ref 1–2)
ALP LIVER SERPL-CCNC: 126 U/L
ALT SERPL-CCNC: 7 U/L
ANION GAP SERPL CALC-SCNC: 7 MMOL/L (ref 0–18)
AST SERPL-CCNC: 14 U/L (ref ?–34)
BASOPHILS # BLD AUTO: 0.03 X10(3) UL (ref 0–0.2)
BASOPHILS NFR BLD AUTO: 0.6 %
BILIRUB SERPL-MCNC: 0.3 MG/DL (ref 0.2–1.1)
BUN BLD-MCNC: 28 MG/DL (ref 9–23)
BUN/CREAT SERPL: 15.4 (ref 10–20)
CALCIUM BLD-MCNC: 8.9 MG/DL (ref 8.7–10.4)
CHLORIDE SERPL-SCNC: 112 MMOL/L (ref 98–112)
CO2 SERPL-SCNC: 27 MMOL/L (ref 21–32)
CREAT BLD-MCNC: 1.82 MG/DL
CRP SERPL HS-MCNC: 7.96 MG/L (ref ?–3)
DEPRECATED RDW RBC AUTO: 51 FL (ref 35.1–46.3)
EGFRCR SERPLBLD CKD-EPI 2021: 39 ML/MIN/1.73M2 (ref 60–?)
EOSINOPHIL # BLD AUTO: 0.31 X10(3) UL (ref 0–0.7)
EOSINOPHIL NFR BLD AUTO: 6.1 %
ERYTHROCYTE [DISTWIDTH] IN BLOOD BY AUTOMATED COUNT: 17.5 % (ref 11–15)
ERYTHROCYTE [SEDIMENTATION RATE] IN BLOOD: 27 MM/HR
FASTING STATUS PATIENT QL REPORTED: YES
GLOBULIN PLAS-MCNC: 3.4 G/DL (ref 2–3.5)
GLUCOSE BLD-MCNC: 100 MG/DL (ref 70–99)
HCT VFR BLD AUTO: 29.9 %
HGB BLD-MCNC: 8.9 G/DL
IMM GRANULOCYTES # BLD AUTO: 0 X10(3) UL (ref 0–1)
IMM GRANULOCYTES NFR BLD: 0 %
LYMPHOCYTES # BLD AUTO: 1.2 X10(3) UL (ref 1–4)
LYMPHOCYTES NFR BLD AUTO: 23.7 %
MCH RBC QN AUTO: 23.7 PG (ref 26–34)
MCHC RBC AUTO-ENTMCNC: 29.8 G/DL (ref 31–37)
MCV RBC AUTO: 79.5 FL
MONOCYTES # BLD AUTO: 0.43 X10(3) UL (ref 0.1–1)
MONOCYTES NFR BLD AUTO: 8.5 %
NEUTROPHILS # BLD AUTO: 3.1 X10 (3) UL (ref 1.5–7.7)
NEUTROPHILS # BLD AUTO: 3.1 X10(3) UL (ref 1.5–7.7)
NEUTROPHILS NFR BLD AUTO: 61.1 %
OSMOLALITY SERPL CALC.SUM OF ELEC: 308 MOSM/KG (ref 275–295)
PLATELET # BLD AUTO: 216 10(3)UL (ref 150–450)
POTASSIUM SERPL-SCNC: 4.6 MMOL/L (ref 3.5–5.1)
PROT SERPL-MCNC: 7.7 G/DL (ref 5.7–8.2)
RBC # BLD AUTO: 3.76 X10(6)UL
RHEUMATOID FACT SERPL-ACNC: <3.5 IU/ML (ref ?–14)
SODIUM SERPL-SCNC: 146 MMOL/L (ref 136–145)
TSI SER-ACNC: 0.63 MIU/ML (ref 0.55–4.78)
URATE SERPL-MCNC: 8.2 MG/DL
VIT D+METAB SERPL-MCNC: 9 NG/ML (ref 30–100)
WBC # BLD AUTO: 5.1 X10(3) UL (ref 4–11)

## 2024-07-20 PROCEDURE — 86200 CCP ANTIBODY: CPT

## 2024-07-20 PROCEDURE — 80053 COMPREHEN METABOLIC PANEL: CPT

## 2024-07-20 PROCEDURE — 84550 ASSAY OF BLOOD/URIC ACID: CPT

## 2024-07-20 PROCEDURE — 86431 RHEUMATOID FACTOR QUANT: CPT

## 2024-07-20 PROCEDURE — 86141 C-REACTIVE PROTEIN HS: CPT

## 2024-07-20 PROCEDURE — 85025 COMPLETE CBC W/AUTO DIFF WBC: CPT

## 2024-07-20 PROCEDURE — 86225 DNA ANTIBODY NATIVE: CPT

## 2024-07-20 PROCEDURE — 82306 VITAMIN D 25 HYDROXY: CPT

## 2024-07-20 PROCEDURE — 86038 ANTINUCLEAR ANTIBODIES: CPT

## 2024-07-20 PROCEDURE — 84443 ASSAY THYROID STIM HORMONE: CPT

## 2024-07-20 PROCEDURE — 85652 RBC SED RATE AUTOMATED: CPT

## 2024-07-20 PROCEDURE — 36415 COLL VENOUS BLD VENIPUNCTURE: CPT

## 2024-07-22 LAB
CCP IGG SERPL-ACNC: 5.6 U/ML (ref 0–6.9)
DSDNA IGG SERPL IA-ACNC: 127 IU/ML
ENA AB SER QL IA: 0.6 UG/L
ENA AB SER QL IA: POSITIVE

## 2024-07-26 ENCOUNTER — TELEPHONE (OUTPATIENT)
Dept: HEMATOLOGY/ONCOLOGY | Facility: HOSPITAL | Age: 74
End: 2024-07-26

## 2024-07-26 NOTE — TELEPHONE ENCOUNTER
I called the patient with an [facility]  and he stated he missed his appointment with Dr. Barrett on 7/24/24, because he did not know where to go and he stated that if it is not in [place] he can not come to see Dr. Barrett. I told him that we would notify Dr. TIFFANY Delaney that he would like to see a DrQuynh In [place]. Called 7/26/24 GR

## 2024-07-29 ENCOUNTER — TELEPHONE (OUTPATIENT)
Facility: CLINIC | Age: 74
End: 2024-07-29

## 2024-07-29 ENCOUNTER — TELEPHONE (OUTPATIENT)
Dept: INTERNAL MEDICINE CLINIC | Facility: CLINIC | Age: 74
End: 2024-07-29

## 2024-07-29 DIAGNOSIS — R76.8 POSITIVE ANA (ANTINUCLEAR ANTIBODY): Primary | ICD-10-CM

## 2024-07-29 NOTE — TELEPHONE ENCOUNTER
Language line interpretor  #[id number]    Unable to reach patient and patient's daughter  LMTCB on both numbers

## 2024-07-29 NOTE — TELEPHONE ENCOUNTER
Called patient's daughter Lary who has release of information permission.,verified  patient's name and date of birth.  Reviewed Dr Delaney's  recommendation from the note below to reschedule with Dr Barrett and keep gastroenterology appointment.  Provides contact information, addresses and parking instructions for both Dr Barrett and Dr Arora.  Lary verbalizes understanding. She Will talk to patient and attempt to get a helper to drive him to these appointment because it's difficult for him to navigate to a new location because of language barrier.

## 2024-07-29 NOTE — PROGRESS NOTES
Hello-could someone please reach out to the patient?  I can fit them in at my [place] clinic on Friday, 8/16 at 9:15 AM.  Additionally, if someone could please asked the patient to have nonfasting labs drawn prior to the appointment that would be helpful.  Thank you.

## 2024-07-29 NOTE — TELEPHONE ENCOUNTER
RE: Consult Referral Update  Received: 2 days ago  Marcie Delaney MD Diaz, Mariko, RN; P Em Rn Triage; P Em Onc Arnold Rns; P Em Gi Clinical Staff  Cc: Boone Barrett MD  Referrals and  address  was discuses with daughter   was given instructions and  address for  Dr Barrett and  other visits   no  hematology is available in [place] .    Patient  need  to schedule visit asap ,  since his anemia is getting  worse .  Hx   stomach ulcer and bleed .   To  see  gastro also asap          Previous Messages       ----- Message -----  From: Rebeca Sarabia RN  Sent: 7/26/2024  10:23 AM CDT  To: Boone Barrett MD; Marcie Delaney MD  Subject: Consult Referral Update                          Hi Dr. Delaney,    This new consult patient you referred to Dr. Barrett for iron deficiency anemia missed his consult appointment on 7/24/24. Our call center tried to reschedule him, but he stated he missed his appointment with Dr. Barrett on 7/24/24, because he did not know where to go and he stated that if it is not in [place] he can not come to see Dr. Barrett. He stated he would like to see a doctor in [place]. See telephone encounter from 7/26/2024.    ThanksRebeca RN

## 2024-07-29 NOTE — TELEPHONE ENCOUNTER
Received message below via Staff Messages.     Patient has appt with Dr. Payne on 8/19/2024.     Message requesting sooner appt for worsening anemia.

## 2024-07-29 NOTE — TELEPHONE ENCOUNTER
----- Message from Marcie Delaney sent at 7/27/2024  7:37 AM CDT -----  Regarding: RE: Consult Referral Update  Referrals and  address  was discuses with daughter   was given instructions and  address for  Dr Barrett and  other visits    no  hematology is available in [place] .    Patient  need  to schedule visit asap ,  since his anemia is getting  worse .  Hx   stomach ulcer and bleed .   To  see  gastro also asap  ----- Message -----  From: Rebeca Sarabia RN  Sent: 7/26/2024  10:23 AM CDT  To: Boone Barrett MD; Marcie Delaney MD  Subject: Consult Referral Update                          Hi Dr. Delaney,    This new consult patient you referred to Dr. Barrett for iron deficiency anemia missed his consult appointment on 7/24/24. Our call center tried to reschedule him, but he stated he missed his appointment with Dr. Barrett on 7/24/24, because he did not know where to go and he stated that if it is not in [place] he can not come to see Dr. Barrett. He stated he would like to see a doctor in [place]. See telephone encounter from 7/26/2024.     Rebeca Arias RN

## 2024-07-30 ENCOUNTER — TELEPHONE (OUTPATIENT)
Dept: RHEUMATOLOGY | Facility: CLINIC | Age: 74
End: 2024-07-30

## 2024-07-30 NOTE — TELEPHONE ENCOUNTER
----- Message from Harvey Headley sent at 7/29/2024  4:37 PM CDT -----  Hello-could someone please reach out to the patient?  I can fit them in at my [place] clinic on Friday, 8/16 at 9:15 AM.  Additionally, if someone could please asked the patient to have nonfasting labs drawn prior to the appointment that would be hel  pful.  Thank you.

## 2024-07-30 NOTE — TELEPHONE ENCOUNTER
Left message using Cam  to call back and let us know if he can come in 8/16/24 at 9:15 am in [facility]? If so informed patient to have labs drawn prior to appointment.    If agreeable please schedule patient as a CONSULT.

## 2024-08-01 ENCOUNTER — TELEPHONE (OUTPATIENT)
Dept: INTERNAL MEDICINE CLINIC | Facility: CLINIC | Age: 74
End: 2024-08-01

## 2024-08-01 NOTE — TELEPHONE ENCOUNTER
----- Message from Boone Barrett sent at 7/30/2024  5:25 PM CDT -----  Regarding: RE: Consult Referral Update  Hi Marcie,      We do not have any hematologists in [place]. We only see patients here in [facility] or [place] from the [facility].    I would be happy to help if he comes to this location. Thank you for the referral.    Boone  ----- Message -----  From: Marcie Delaney MD  Sent: 7/27/2024   7:42 AM CDT  To: Boone Barrett MD; Rebeca Sarabia, RN; Em Rn Triage; #  Subject: RE: Consult Referral Update                      Referrals and  address  was discuses with daughter   was given instructions and  address for  Dr Barrett and  other visits    no  hematology is available in [place] .    Patient  need  to schedule visit asap ,  since his anemia is getting  worse .  Hx   stomach ulcer and bleed .   To  see  gastro also asap  ----- Message -----  From: eRbeca Sarabia RN  Sent: 7/26/2024  10:23 AM CDT  To: Boone Barrett MD; Marcie Delaney MD  Subject: Consult Referral Update                          Hi Dr. Delaney,    This new consult patient you referred to Dr. Barrett for iron deficiency anemia missed his consult appointment on 7/24/24. Our call center tried to reschedule him, but he stated he missed his appointment with Dr. Barrett on 7/24/24, because he did not know where to go and he stated that if it is not in [place] he can not come to see Dr. Barrett. He stated he would like to see a doctor in [place]. See telephone encounter from 7/26/2024.     Rebeca Arias RN

## 2024-08-02 ENCOUNTER — LAB ENCOUNTER (OUTPATIENT)
Dept: LAB | Age: 74
End: 2024-08-02
Attending: INTERNAL MEDICINE
Payer: COMMERCIAL

## 2024-08-02 DIAGNOSIS — R76.8 POSITIVE ANA (ANTINUCLEAR ANTIBODY): ICD-10-CM

## 2024-08-02 LAB
BILIRUB UR QL: NEGATIVE
C3 SERPL-MCNC: 112 MG/DL (ref 90–170)
C4 SERPL-MCNC: 29.7 MG/DL (ref 12–36)
CLARITY UR: CLEAR
COLOR UR: COLORLESS
GLUCOSE UR-MCNC: NORMAL MG/DL
HGB UR QL STRIP.AUTO: NEGATIVE
KETONES UR-MCNC: NEGATIVE MG/DL
LEUKOCYTE ESTERASE UR QL STRIP.AUTO: NEGATIVE
NITRITE UR QL STRIP.AUTO: NEGATIVE
PH UR: 7 [PH] (ref 5–8)
PROT UR-MCNC: NEGATIVE MG/DL
SP GR UR STRIP: 1.01 (ref 1–1.03)
UROBILINOGEN UR STRIP-ACNC: NORMAL

## 2024-08-02 PROCEDURE — 81003 URINALYSIS AUTO W/O SCOPE: CPT | Performed by: INTERNAL MEDICINE

## 2024-08-02 PROCEDURE — 86235 NUCLEAR ANTIGEN ANTIBODY: CPT | Performed by: INTERNAL MEDICINE

## 2024-08-02 PROCEDURE — 86038 ANTINUCLEAR ANTIBODIES: CPT | Performed by: INTERNAL MEDICINE

## 2024-08-02 PROCEDURE — 86039 ANTINUCLEAR ANTIBODIES (ANA): CPT | Performed by: INTERNAL MEDICINE

## 2024-08-02 PROCEDURE — 86160 COMPLEMENT ANTIGEN: CPT

## 2024-08-02 PROCEDURE — 36415 COLL VENOUS BLD VENIPUNCTURE: CPT | Performed by: INTERNAL MEDICINE

## 2024-08-02 PROCEDURE — 86225 DNA ANTIBODY NATIVE: CPT | Performed by: INTERNAL MEDICINE

## 2024-08-02 NOTE — TELEPHONE ENCOUNTER
Spoke to Lary- on release- verified name and date of birth. She is calling from [place] and is trying to help her dad the best she can. She thinks he will be able to go to the [place] address if he understands where to go. She is going to call him soon to discuss this with him.

## 2024-08-02 NOTE — TELEPHONE ENCOUNTER
Language line interpretor  Sulema #[id number]    LMTCB  Attempt #3    Patient has an appt scheduled for 8/19/2024  Your Appointments        Monday August 19, 2024 3:30 PM  Consult with Racquel Payne MD  [facility] ([facility]) [facility] [address] [address]  [phone]        Sending out no response letter and closing TE

## 2024-08-05 LAB — NUCLEAR IGG TITR SER IF: POSITIVE {TITER}

## 2024-08-06 LAB
CENTROMERE IGG SER-ACNC: 0.7 U/ML
DSDNA AB TITR SER: <10 {TITER}
ENA JO1 AB SER IA-ACNC: 0.4 U/ML
ENA RNP IGG SER IA-ACNC: 4.4 U/ML
ENA SCL70 IGG SER IA-ACNC: 1.8 U/ML
ENA SM IGG SER IA-ACNC: 0.8 U/ML
ENA SS-A IGG SER IA-ACNC: 0.5 U/ML
ENA SS-B IGG SER IA-ACNC: 0.7 U/ML
U1 SNRNP IGG SER IA-ACNC: 3.2 U/ML

## 2024-08-07 LAB — ANA NUCLEOLAR TITR SER IF: 160 {TITER}

## 2024-08-13 ENCOUNTER — OFFICE VISIT (OUTPATIENT)
Dept: INTERNAL MEDICINE CLINIC | Facility: CLINIC | Age: 74
End: 2024-08-13

## 2024-08-13 VITALS
BODY MASS INDEX: 24.52 KG/M2 | WEIGHT: 181 LBS | DIASTOLIC BLOOD PRESSURE: 80 MMHG | SYSTOLIC BLOOD PRESSURE: 159 MMHG | HEIGHT: 72 IN | HEART RATE: 58 BPM

## 2024-08-13 DIAGNOSIS — I10 ESSENTIAL HYPERTENSION WITH GOAL BLOOD PRESSURE LESS THAN 130/80: Primary | ICD-10-CM

## 2024-08-13 DIAGNOSIS — Z86.79 H/O CARDIAC ARRHYTHMIA: ICD-10-CM

## 2024-08-13 DIAGNOSIS — D64.9 ANEMIA, UNSPECIFIED TYPE: ICD-10-CM

## 2024-08-13 DIAGNOSIS — R73.03 PREDIABETES: ICD-10-CM

## 2024-08-13 PROCEDURE — 3079F DIAST BP 80-89 MM HG: CPT | Performed by: INTERNAL MEDICINE

## 2024-08-13 PROCEDURE — 3008F BODY MASS INDEX DOCD: CPT | Performed by: INTERNAL MEDICINE

## 2024-08-13 PROCEDURE — 99215 OFFICE O/P EST HI 40 MIN: CPT | Performed by: INTERNAL MEDICINE

## 2024-08-13 PROCEDURE — 3077F SYST BP >= 140 MM HG: CPT | Performed by: INTERNAL MEDICINE

## 2024-08-13 RX ORDER — HYDRALAZINE HYDROCHLORIDE 25 MG/1
25 TABLET, FILM COATED ORAL 3 TIMES DAILY
Qty: 60 TABLET | Refills: 0 | Status: SHIPPED | OUTPATIENT
Start: 2024-08-13

## 2024-08-13 NOTE — PROGRESS NOTES
Subjective:   Patient ID: Ashley Matos is a 74 year old male.  Chief Complaint   Patient presents with    Hypertension     Most recent labs completed on 7/20/24       Patient is limited with language he speaks [place] and his daughter is on the phone interpreting-    Patient states feels well  , denies concerns or symptoms today   F/u labs   he is eating well and denies any stomach  pains  denies any nausea ,vomiting ,diarrhea ,constipation or blood in stool.      Patient states he has been working without difficulties   just he has  chronic r knee pain improved ,but not taking any ibuprofen or aspirin-based medications.  He takes Tylenol as needed for pain.    Patient states feeling well otherwise. Pt also Denies chest pain, shortnesss of breath, palpitations    He state did not take for some time -run out of refill   Metoprolol  25 mg twice daily   Bp elevated today    Denies abdominal pain, denies UTI symptoms fever or chills .         Past Medical History:  Diagnosis Date  Difficulty in urination  Enlarged prostate  Essential (primary) hypertension  Gastric ulcer  Gastroesophageal reflux disease  Primary generalized (osteo)arthritis        Diagnosis  Acute gastric ulcer, unspecified whether gastric ulcer hemorrhage or perforation present  SBO (small bowel obstruction) (CMD)  Atrial flutter (CMD)  SETH (acute kidney injury) (CMD)    Current Outpatient Medications  Medication Sig Dispense Refill  tamsulosin (FLOMAX) 0.4 MG Cap Take 0.4 mg by mouth daily.  metoPROLOL tartrate (LOPRESSOR) 25 MG tablet Take 1 tablet by mouth every 12 hours. 60 tablet 3  omeprazole (PriLOSEC) 40 MG capsule Take 1 capsule by mouth in the morning and 1 capsule in the evening. 60 capsule 1  Misc Natural Products (Pumpkin Seed Oil) Cap Take 1 capsule by mouth daily.      ALLERGIES:  No Known Allergies        History/Other:   Review of Systems   Constitutional:  Negative for chills, fatigue and fever.   HENT:  Negative for ear pain and  sore throat.    Eyes:  Negative for pain and redness.   Respiratory:  Negative for cough, shortness of breath and wheezing.    Cardiovascular:  Negative for chest pain, palpitations and leg swelling.   Gastrointestinal:  Negative for abdominal pain, anal bleeding, blood in stool, constipation, diarrhea, nausea, rectal pain and vomiting.        Denies heartburns   Genitourinary:  Negative for dysuria and frequency.   Skin:  Negative for pallor.   Neurological:  Negative for dizziness and headaches.   Psychiatric/Behavioral:  Negative for confusion. The patient is not nervous/anxious.      Current Outpatient Medications   Medication Sig Dispense Refill    hydrALAZINE 25 MG Oral Tab Take 1 tablet (25 mg total) by mouth 3 (three) times daily. 60 tablet 0    metoprolol tartrate 25 MG Oral Tab Take 1 tablet (25 mg total) by mouth Q12H. 180 tablet 0    Omeprazole 40 MG Oral Capsule Delayed Release Take 1 capsule by mouth once a week      Misc Natural Products (PUMPKIN SEED OIL OR) Take 1 capsule by mouth daily.      tamsulosin 0.4 MG Oral Cap Take 2 capsules (0.8 mg total) by mouth nightly. TAKE AT BEDTIME       Allergies:No Known Allergies    Objective:   Physical Exam  Vitals and nursing note reviewed.   Constitutional:       General: He is not in acute distress.  HENT:      Head: Normocephalic and atraumatic.   Eyes:      General: No scleral icterus.        Right eye: No discharge.         Left eye: No discharge.   Cardiovascular:      Rate and Rhythm: Normal rate and regular rhythm.      Heart sounds: Normal heart sounds. No murmur heard.  Pulmonary:      Effort: Pulmonary effort is normal. No respiratory distress.      Breath sounds: Normal breath sounds. No wheezing or rales.   Abdominal:      Palpations: Abdomen is soft. There is no mass.      Tenderness: There is no abdominal tenderness. There is no right CVA tenderness or left CVA tenderness.      Comments: No hepatomegaly, no splenomegaly   Musculoskeletal:       Cervical back: Neck supple.      Right knee: No swelling, erythema, bony tenderness or crepitus. Decreased range of motion.      Right lower leg: No edema.      Left lower leg: No edema.   Lymphadenopathy:      Cervical: No cervical adenopathy.   Skin:     General: Skin is warm and dry.   Neurological:      Mental Status: He is alert and oriented to person, place, and time.   Psychiatric:         Behavior: Behavior normal.       Blood pressure 159/80, pulse 58, height 6' (1.829 m), weight 181 lb (82.1 kg).    Assessment & Plan:       Essential hypertension with goal blood pressure less than 130/80  History of atrial flutter  Patient has Holter monitor-showers occasional  PSVT   Continue with metoprolol 25 mg twice daily  -restart Hydralazine 25   bid    Send  refill   F/u with  cardiologist soon   - Cardio Referral - again   Internal     Benign prostatic hyperplasia without lower urinary tract symptoms  Patient is on tamsulosin 0.5 mg 2 capsules at night  Stable symptoms continue present management follow-up with urology  Fluids   Stable cpm      Gastroesophageal reflux disease without esophagitis  Patient advised to avoid spicy food, coffee, tea, caffeinated drinks, alcohol, acidic food/juices  Advised to avoid NSAID's - Aspirin-based medications  Advised to avoid wearing  tight clothes   Advised to elevate the head of the bed   Avoid eating at least 3 hours before bedtime   Counseling on ideal weight/BMI  Patient not -Taking  omeprazole 40 mg twice daily 30-60  minutes before breakfast and dinner  Stoped state makes him sick feeling ..   Side effects and directions of medication discussed with patient. Patient verbalized understanding and compliance.    Stable cpm on diet   Labs     - Gastro Referral - has apt  8/19      Right knee pain - improved   mat Dr Headley R   X-ray right knee - moderate fluid    Can give Tylenol 500 mg once daily for now  as needed   Avoid nsaids   Avoid salty food and red meat rich food  ,dry meat ,saltines..   Stable cpm       Prediabetes   Decrease sugar and carbohydrate diet   Labs to follow   A1c 6.3        Stage 3 chronic kidney disease, unspecified whether stage 3a or 3b CKD (HCC)  Labs to complete today  Last GFR was in 50s  Keep with good water hydration and avoid NSAIDs       Anemia   likely due to bleeding -gastric  Last hemoglobin on 12/31   -8.0--  5/24 -9.2 --8.9   Labs today to complete  Hematology - has apt  with Dr Barrett -7/24 - pt  went to  different location  had  to reschedule apt   Referred   If any persistent worsening symptoms patient to go to emergency room         Total time spent caring for the patient on the day of the encounter:  40 min  This includes pre-charting, reviewing and obtaining results, exam, plan, notes, and counseling.      No orders of the defined types were placed in this encounter.      Meds This Visit:  Requested Prescriptions     Signed Prescriptions Disp Refills    hydrALAZINE 25 MG Oral Tab 60 tablet 0     Sig: Take 1 tablet (25 mg total) by mouth 3 (three) times daily.       Imaging & Referrals:  OP REFERRAL TO [facility]COLOGY [facility]  CARDIO - INTERNAL

## 2024-08-15 NOTE — PROGRESS NOTES
RHEUMATOLOGY CLINIC- NEW PATIENT    Ashley Matos is a 74 year old male.    ASSESSMENT/PLAN:       ICD-10-CM    1. Positive PARTHA (antinuclear antibody)  R76.8       2. Chronic pain of both knees  M25.561     M25.562     G89.29       3. Gastric ulcer with hemorrhage, unspecified chronicity  K25.4       4. Gastroesophageal reflux disease without esophagitis  K21.9       5. Anemia, unspecified type  D64.9     GI appt 8/19/24        DISCUSSION:  Patient presents as a new outpatient referral from his PCP for evaluation of abnormal PARTHA with positive dsDNA.  Subsequent testing notable for negative dsDNA by IFA and only low positive PARTHA that I do not think is clinically significant.  No overt synovitis on current exam or other findings diagnostic of an underlying connective tissue disease nor inflammatory arthritis and patient is not currently in pain.  Discussed with patient conservative management for now with Tylenol arthritis and topical Voltaren.  Can contact office if symptoms recur and we will consider aspiration/injection at that time.    PLAN:  - For pain relief, patient can take Tylenol-arthritis strength at 2 tablets every 8 hours as needed. May also apply topical Voltaren (diclofenac gel) to their affected areas of pain up to 4 times daily  -Patient to contact office if symptoms recur and can consider CSI/aspiration at the time  - Consult/evaluation communicated with referring physician/provider.    Pt will f/u as needed    Harvey Headley DO  8/15/2024   5:23 PM  There is a longitudinal care relationship with me, the care plan reflects the ongoing nature of the continuous relationship of care, and the medical record indicates that there is ongoing treatment of a serious/complex medical condition which I am currently managing.  is Applicable.         HPI:     8/15/2024 Initial Consult: Cam : [id number]    I had the pleasure of seeing Ashley Matos on 8/15/2024 as a new outpatient consultation  for abnormal PARTHA by KEN with dsDNA. The patient was originally referred by his PCP.     74 year old male w/ PMH HTN, atrial flutter, SBO, gastric ulcer with hemorrhage, GERD, BPH, anemia who presents to clinic today.  Patient noting on and off knee pain but a severe episode starting about 1 month prior.  Notes that symptoms have almost completely resolved once stopping omeprazole.  Currently reports 0/10 pain.  Subsequent workup given his arthralgias notable for abnormal PARTHA with positive dsDNA despite negative PARTHA.  Repeat serologies for dsDNA negative by IFA and only a low positive PARTHA.  Otherwise, patient reports occasional red spots on his hands and shins (not photosensitive) but slightly itchy.    Current Medications:  Tylenol as needed    Medication History:  N/A    Interval History:   See Above    HISTORY:  No past medical history on file.   Social Hx Reviewed   Family Hx Reviewed     Medications (Active prior to today's visit):  Current Outpatient Medications   Medication Sig Dispense Refill    hydrALAZINE 25 MG Oral Tab Take 1 tablet (25 mg total) by mouth 3 (three) times daily. 60 tablet 0    metoprolol tartrate 25 MG Oral Tab Take 1 tablet (25 mg total) by mouth Q12H. 180 tablet 0    Omeprazole 40 MG Oral Capsule Delayed Release Take 1 capsule by mouth once a week      Misc Natural Products (PUMPKIN SEED OIL OR) Take 1 capsule by mouth daily.      tamsulosin 0.4 MG Oral Cap Take 2 capsules (0.8 mg total) by mouth nightly. TAKE AT BEDTIME         Allergies:  No Known Allergies      ROS:   Review of Systems   Constitutional:  Negative for chills and fever.   HENT:  Negative for congestion, hearing loss, mouth sores, nosebleeds and trouble swallowing.    Eyes:  Negative for photophobia, pain, redness and visual disturbance.   Respiratory:  Negative for cough and shortness of breath.    Cardiovascular:  Negative for chest pain, palpitations and leg swelling.   Gastrointestinal:  Negative for abdominal pain,  blood in stool, diarrhea and nausea.   Endocrine: Negative for cold intolerance and heat intolerance.   Genitourinary:  Negative for dysuria, frequency and hematuria.   Musculoskeletal:  Negative for arthralgias, back pain, gait problem, joint swelling, myalgias, neck pain and neck stiffness.   Skin:  Negative for color change and rash.   Neurological:  Negative for dizziness, weakness, numbness and headaches.   Psychiatric/Behavioral:  Negative for confusion and dysphoric mood.         PHYSICAL EXAM:     Constitutional:  Well developed, Well nourished, No acute distress  HENT:  Normocephalic, Atraumatic, Bilateral external ears normal, Oropharynx moist, No oral exudates.  Neck: Normal range of motion, No tenderness, Supple, No stridor.  Eyes:  PERRL, EOMI, Conjunctiva normal, No discharge.  Respiratory:  Normal breath sounds, No respiratory distress, No wheezing.  Cardiovascular:  Normal heart rate, Normal rhythm, No murmurs, No rubs, No gallops.  GI:  Bowel sounds normal, Soft, No tenderness, No masses, No pulsatile masses.  : No CVA tenderness.  Musculoskeletal:  A comprehensive 28 count joint exam was done and was negative for swelling or tenderness except as noted. Inspections for misalignment, asymmetry, crepitation, defects, tenderness, masses, nodules, effusions, range of motion, and stability in the upper and lower extremities bilaterally are all normal unless noted.           Integument:  Warm, Dry,  eczematous changes of his bilateral hand and left shin  Lymphatic:  No lymphadenopathy noted.  Neurologic:  Alert & oriented x 3, Normal motor function, Normal sensory function, No focal deficits noted.  Psychiatric:  Affect normal, Judgment normal, Mood normal.    LABS:     Prior lab work reviewed and notable for:     8/2/2024:  PARTHA 1: 160 speckled with reflex antibodies negative including dsDNA by IFA  C3 112 WNL, C4 29.7 WNL    UA negative blood/protein    7/20/2024:  CCP 5.6 WNL, RF less than 3.5  WNL  PARTHA by KEN with negative PARTHA but dsDNA 827 (high  TSH 0.634 WNL  Uric acid 8.2 WNL  High-sensitivity CRP 7.96 (high), ESR 27 slightly high)    CMP with BUN 28 (high), CR 1.82 (high)  CBC with normal WBC, Hg 8.9 microcytic,     Imagin2024 right knee XR:    Impression   CONCLUSION: Moderate joint effusion

## 2024-08-16 ENCOUNTER — OFFICE VISIT (OUTPATIENT)
Dept: RHEUMATOLOGY | Facility: CLINIC | Age: 74
End: 2024-08-16

## 2024-08-16 VITALS — WEIGHT: 180 LBS | BODY MASS INDEX: 24.38 KG/M2 | HEIGHT: 72 IN

## 2024-08-16 DIAGNOSIS — M25.561 CHRONIC PAIN OF BOTH KNEES: ICD-10-CM

## 2024-08-16 DIAGNOSIS — R76.8 POSITIVE ANA (ANTINUCLEAR ANTIBODY): Primary | ICD-10-CM

## 2024-08-16 DIAGNOSIS — M25.562 CHRONIC PAIN OF BOTH KNEES: ICD-10-CM

## 2024-08-16 DIAGNOSIS — D64.9 ANEMIA, UNSPECIFIED TYPE: ICD-10-CM

## 2024-08-16 DIAGNOSIS — K25.4 GASTRIC ULCER WITH HEMORRHAGE, UNSPECIFIED CHRONICITY: ICD-10-CM

## 2024-08-16 DIAGNOSIS — G89.29 CHRONIC PAIN OF BOTH KNEES: ICD-10-CM

## 2024-08-16 DIAGNOSIS — K21.9 GASTROESOPHAGEAL REFLUX DISEASE WITHOUT ESOPHAGITIS: ICD-10-CM

## 2024-08-16 PROCEDURE — 3008F BODY MASS INDEX DOCD: CPT | Performed by: INTERNAL MEDICINE

## 2024-08-16 PROCEDURE — 99244 OFF/OP CNSLTJ NEW/EST MOD 40: CPT | Performed by: INTERNAL MEDICINE

## 2024-08-20 ENCOUNTER — TELEPHONE (OUTPATIENT)
Dept: GASTROENTEROLOGY | Facility: CLINIC | Age: 74
End: 2024-08-20

## 2024-08-20 ENCOUNTER — TELEPHONE (OUTPATIENT)
Facility: CLINIC | Age: 74
End: 2024-08-20

## 2024-08-20 ENCOUNTER — OFFICE VISIT (OUTPATIENT)
Dept: GASTROENTEROLOGY | Facility: CLINIC | Age: 74
End: 2024-08-20

## 2024-08-20 VITALS
HEIGHT: 72 IN | WEIGHT: 181 LBS | SYSTOLIC BLOOD PRESSURE: 140 MMHG | DIASTOLIC BLOOD PRESSURE: 60 MMHG | BODY MASS INDEX: 24.52 KG/M2

## 2024-08-20 DIAGNOSIS — D64.9 ANEMIA, UNSPECIFIED TYPE: ICD-10-CM

## 2024-08-20 DIAGNOSIS — K25.4 GASTRIC ULCER WITH HEMORRHAGE, UNSPECIFIED CHRONICITY: Primary | ICD-10-CM

## 2024-08-20 PROCEDURE — 3008F BODY MASS INDEX DOCD: CPT | Performed by: INTERNAL MEDICINE

## 2024-08-20 PROCEDURE — 3078F DIAST BP <80 MM HG: CPT | Performed by: INTERNAL MEDICINE

## 2024-08-20 PROCEDURE — 3077F SYST BP >= 140 MM HG: CPT | Performed by: INTERNAL MEDICINE

## 2024-08-20 PROCEDURE — 99245 OFF/OP CONSLTJ NEW/EST HI 55: CPT | Performed by: INTERNAL MEDICINE

## 2024-08-20 RX ORDER — PNV NO.95/FERROUS FUM/FOLIC AC 28MG-0.8MG
1 TABLET ORAL EVERY OTHER DAY
Qty: 90 TABLET | Refills: 3 | Status: SHIPPED | OUTPATIENT
Start: 2024-08-20

## 2024-08-20 RX ORDER — PANTOPRAZOLE SODIUM 40 MG/1
40 TABLET, DELAYED RELEASE ORAL 2 TIMES DAILY
Qty: 90 TABLET | Refills: 2 | Status: SHIPPED | OUTPATIENT
Start: 2024-08-20 | End: 2024-08-22

## 2024-08-20 NOTE — PATIENT INSTRUCTIONS
Get your labs checked.   Start taking an iron supplement every other day. Take miralax once daily with the iron as it may cause constipation. This is a powder you mix with water.     1. Schedule an upper endoscopy (EGD) with [facility] [Diagnosis: history of gastric ulcers, anemia]    2. Do not eat or drink after midnight the night before your procedure.     3. Medication adjustments:       A. Continue ALL medications as usual.    4. If you start any NEW medication after your visit today, please notify us. Certain medications will need to be held before the procedure, or the procedure cannot be performed.     5. You will need a ride home from your procedure since you are receiving sedation. Please ensure you will have an available ride home or the procedure cannot be performed.

## 2024-08-20 NOTE — TELEPHONE ENCOUNTER
GI staff: Please complete a prior Auth for pantoprazole.  Patient cannot tolerate omeprazole due to significant side effects.  Thank you.

## 2024-08-20 NOTE — TELEPHONE ENCOUNTER
Scheduled for:  EGD 05643 (35 mins)  Provider Name:  Dr. Payne  Date:  8/29/2024  Location:  [facility]  Sedation:  [facility]  Time:  8:05 am, (pt is aware that [facility] will call the day before to confirm arrival time)  Prep:  NPO after midnight  Meds/Allergies Reconciled?:  Physician reviewed  Diagnosis with codes:  Gastric ulcer with hemorrhage K25.4; Anemia, unspecified D64.9  Was patient informed to call insurance with codes (Y/N):  Yes  Referral sent?:  Referral was sent at the time of electronic surgical scheduling.  [facility] or [facility] notified?:  I sent an electronic request to Endo Scheduling and received a confirmation today.    Medication Orders:  N/A - holding off starting iron until after procedure  Misc Orders:  N/A     Further instructions given by staff:  Prep instructions were given to pt in the office, pt verbalized understanding.

## 2024-08-20 NOTE — H&P
[facility] - Gastroenterology                                                                                                               Reason for consult: Anemia, history of gastric ulcers    Requesting physician or provider: Marcie Delaney MD    Chief Complaint   Patient presents with    Consult     Referred by PCP for anemia        HPI:   Ashley Matos is a 74 year old man with history of hypertension, gastric ulcers, anemia presenting for evaluation of gastric ulcers and anemia.    He was hospitalized in December 2023 for severe abdominal pain.  He had imaging as below that was concerning for gastric ulcer.  He underwent EGD at that time that demonstrated ulcer(?s).  The EGD report is unavailable.  There is a pathology report that demonstrated gastritis and focal intestinal metaplasia on random gastric biopsies, no H. pylori.  There are biopsies obtained from a prepyloric gastric ulcer and gastric body ulcer.  The tissue from the prepyloric gastric ulcer did not survive processing.  The gastric body ulcer biopsies were benign.  He was scheduled to repeat an EGD in February 2024, but he was not able to make this appointment.  He did not follow-up with his previous gastroenterologist.  He notes that his abdominal pain has resolved.  He denies melena.  He denies dysphagia, nausea/vomiting, acid reflux.  He is not on blood thinning medications.  He has been taking omeprazole, but he complains that this gives him muscle aches in his bilateral lower extremities, causes voice changes, and makes him very tired.  He has not been taking this consistently on a daily basis.  It was previously prescribed twice daily.  He he had recent labs with hemoglobin 8.9, MCV 79.5.  No recent iron studies.  He is not currently on iron supplementation.    He previously had a colonoscopy 8 to 10 years ago in [place], [place].  His  son notes that t this was \"abnormal\" and that he should continue to follow regularly.  They are unable to tell me any more details about this.    CT abdomen/pelvis 12/2023:  1.  Marked gastric wall thickening with large lesser curvature ulceration.   The gastric wall posteriorly adjacent to the pancreas is not well seen and   may be perforated, but no free air is identified.   2.  Regional lymph nodes around the stomach are enlarged and could be   reactive or potentially metastatic.   3.  Moderate to severe bilateral hydroureteronephrosis and distended   bladder are likely from chronic outlet obstruction.  The kidneys   demonstrate decreased enhancement and delayed excretion although they do   excrete contrast.   4.  Perinephric fat stranding may be sequela of obstruction, but urinary   tract infection is possible.   5.  Bladder wall thickening may be in part due to chronic outlet   obstruction given the enlarged prostate gland, but infection is possible   and correlation with urinalysis is recommended.   6.  Multiple lipomas and lipomatous tumors including one adjacent to the   right subtrochanteric femur containing calcification.  Radiographic   follow-up is recommended to ensure stability.   7.  Please see above for additional observations.     Upper GI series 01/2024:  1. Large posterior gastric wall ulceration at the midportion of the stomach   along the lesser curve of the stomach measuring 5.1 cm showing contrast   retention best seen on the supine fluoroscopic images.     2. Gastric wall thickening in keeping with diffuse gastritis.     3. No contrast leak to suggest through and through perforation of the   gastric wall by the gastric ulceration.     4. No obstruction to the flow of contrast.     5. Other findings are detailed above.     Prior endoscopies:  Path report:  A. Random gastric biopsy:  -Fragments of gastric oxyntic and antral type mucosa with patchy mild chronic inactive gastritis and reactive  epithelial changes along with focal intestinal metaplasia  -Immunohistochemical stain for Helicobacter pylori is negative    B. Prepyloric gastric ulcer, biopsy:  -The tissue did not survive processing    C. Gastric body ulcer, biopsy:  -Superficial fragments of gastric oxyntic and antral type mucosa with mild inactive chronic inflammation and reactive foveolar changes along with focal intestinal metaplasia  -Immunohistochemical stain for Helicobacter pylori is negative      Soc:  -Previous smoking history  -Denies EtOH    Wt Readings from Last 6 Encounters:   08/20/24 181 lb (82.1 kg)   08/16/24 180 lb (81.6 kg)   08/13/24 181 lb (82.1 kg)   07/03/24 187 lb (84.8 kg)   04/17/24 180 lb (81.6 kg)   02/21/24 179 lb (81.2 kg)        History, Medications, Allergies, ROS:      History reviewed. No pertinent past medical history.   History reviewed. No pertinent surgical history.   Family Hx: History reviewed. No pertinent family history.   Social History:   Social History     Socioeconomic History    Marital status:    Tobacco Use    Smoking status: Former     Types: Cigarettes    Smokeless tobacco: Never   Vaping Use    Vaping status: Never Used   Substance and Sexual Activity    Alcohol use: Never    Drug use: Never     Social Determinants of Health     Financial Resource Strain: Low Risk  (12/29/2023)    Received from [facility]    Financial Resource Strain     In the past year, have you or any family members you live with been unable to get any of the following when it was really needed? Check all that apply.: None   Food Insecurity: Medium Risk (12/29/2023)    Received from [facility], [facility]    Food Insecurity     Within the past 12 months, you worried that your food would run out before you got money to buy more.  : Sometimes true     Within the past 12 months, the food you bought just didn't last and you didn't have money to get more. :  Sometimes true   Transportation Needs: Not At Risk (12/29/2023)    Received from [facility]    Transportation Needs     In the past 12 months, has lack of reliable transportation kept you from medical appointments, meetings, work or from getting things needed for daily living? : No   Physical Activity: High Risk (1/24/2024)    Received from [facility], [facility]    Exercise Vital Sign     On average, how many days per week do you engage in moderate to strenuous exercise (like a brisk walk)?: 0 days     On average, how many minutes do you engage in exercise at this level?: 0 min   Social Connections: Medium Risk (12/29/2023)    Received from [facility], [facility]    Social Connections     How often do you see or talk to people that you care about and feel close to? (For example: talking to friends on the phone, visiting friends or family, going to [employer] or club meetings): 1 or 2 times a week        Medications (Active prior to today's visit):  Current Outpatient Medications   Medication Sig Dispense Refill    pantoprazole 40 MG Oral Tab EC Take 1 tablet (40 mg total) by mouth 2 (two) times daily. 90 tablet 2    Ferrous Sulfate (IRON) 325 (65 Fe) MG Oral Tab Take 1 tablet by mouth every other day. 90 tablet 3    hydrALAZINE 25 MG Oral Tab Take 1 tablet (25 mg total) by mouth 3 (three) times daily. 60 tablet 0    metoprolol tartrate 25 MG Oral Tab Take 1 tablet (25 mg total) by mouth Q12H. 180 tablet 0    Misc Natural Products (PUMPKIN SEED OIL OR) Take 1 capsule by mouth daily.      tamsulosin 0.4 MG Oral Cap Take 2 capsules (0.8 mg total) by mouth nightly. TAKE AT BEDTIME         Allergies:  No Known Allergies    ROS:   CONSTITUTIONAL:  negative for fevers, rigors  EYES:  negative for diplopia   RESPIRATORY:  negative for severe shortness of breath  CARDIOVASCULAR:  negative for crushing sub-sternal chest pain  GASTROINTESTINAL:   see HPI  GENITOURINARY:  negative for dysuria or gross hematuria  INTEGUMENT/BREAST:  SKIN:  negative for jaundice   ALLERGIC/IMMUNOLOGIC:  negative for hay fever  ENDOCRINE:  negative for cold intolerance and heat intolerance  MUSCULOSKELETAL:  negative for joint effusion/severe erythema  BEHAVIOR/PSYCH:  negative for psychotic behavior    PHYSICAL EXAM:   Blood pressure 140/60, height 6' (1.829 m), weight 181 lb (82.1 kg).    Gen: appears comfortable and in no acute distress  HEENT: sclera appear anicteric, mucus membranes appear moist  CV:  the extremities are warm and well-perfused   Lung: no increased work of breathing, no conversational dyspnea   Abd: soft, non-tender exam in all quadrants without rigidity or guarding, non-distended, no masses palpated  Skin: no jaundice, no apparent rashes   Neuro: alert and interactive, no focal neuro deficits  Psych: cooperative, normal affect     Labs/Imaging:     Patient's pertinent labs and imaging were reviewed and discussed with patient today.      ASSESSMENT/PLAN:   Ashley aMtos is a 74 year old man with history of hypertension, gastric ulcers, anemia presenting for evaluation of gastric ulcers and anemia.    He has history of multiple gastric ulcers.  He previously had EGD after he had abdominal imaging demonstrating changes concerning for gastric ulcer.  At that time he was having significant abdominal pain.  EGD report unavailable, path report benign without H. pylori.  He was supposed to have follow-up EGD 3 months later, but he did not present for this procedure.  He had recent labs demonstrating anemia with hemoglobin 8.9 and microcytosis.  He has not had recent iron studies.  He is not on iron supplementation.  He previously had a colonoscopy 8 to 10 years ago that was reportedly \"abnormal\".  The family does not know any more details about this.  I recommended repeating an EGD and also colonoscopy.  The patient does not want a colonoscopy at this time.  I  recommended that we obtain his records from [place], [place] to see what was previously read as abnormal.  They are unsure at the facility name where he had this done, recommended they find this out and let the GI office know.  We will plan to just perform an EGD per patient's request.  I also recommended that he complete iron studies that were previously ordered by his PCP and start an iron supplement every other day.  He should take MiraLAX with the iron supplement to avoid constipation.  He has not been 100% compliant with the omeprazole.  He feels this gives him muscle aches in his bilateral lower extremities, causes voice changes, and makes him tired.  We can try a different PPI to see if he has less side effects.    Recommendations:  Get your labs checked.   Start taking an iron supplement every other day. Take miralax once daily with the iron as it may cause constipation. This is a powder you mix with water.     1. Schedule an upper endoscopy (EGD) with MAC [Diagnosis: history of gastric ulcers, anemia]    2. Do not eat or drink after midnight the night before your procedure.     3. Medication adjustments:       A. Continue ALL medications as usual.    4. If you start any NEW medication after your visit today, please notify us. Certain medications will need to be held before the procedure, or the procedure cannot be performed.     5. You will need a ride home from your procedure since you are receiving sedation. Please ensure you will have an available ride home or the procedure cannot be performed.           Orders This Visit:  No orders of the defined types were placed in this encounter.      Meds This Visit:  Requested Prescriptions     Signed Prescriptions Disp Refills    pantoprazole 40 MG Oral Tab EC 90 tablet 2     Sig: Take 1 tablet (40 mg total) by mouth 2 (two) times daily.    Ferrous Sulfate (IRON) 325 (65 Fe) MG Oral Tab 90 tablet 3     Sig: Take 1 tablet by mouth every other day.        Imaging & Referrals:  None       Racquel Payne MD  [facility] Gastroenterology  8/20/2024

## 2024-08-21 NOTE — TELEPHONE ENCOUNTER
Called [facility] at [phone], spoke with Alexis AGUILAR, no PA needed, reference number [id number]. Referral updated.

## 2024-08-22 RX ORDER — ESOMEPRAZOLE MAGNESIUM 40 MG/1
40 CAPSULE, DELAYED RELEASE ORAL
Qty: 60 CAPSULE | Refills: 3 | Status: SHIPPED | OUTPATIENT
Start: 2024-08-22

## 2024-08-22 NOTE — TELEPHONE ENCOUNTER
Spoke to pharmacy    Esomeprazole is covered by insurance  Pharmacy states they will let the patient know when it is ready for     HALINA Clement

## 2024-08-22 NOTE — TELEPHONE ENCOUNTER
Dr. Racquel BENTLEY for pantoprazole denied. I am awaiting the denial letter.    I attempted to call patient to discuss options, LMTCB    I spoke with his daughter, Lary  I offered to send the prescription in to [facility] but she stated her father has trouble going online.    Is there an alternative you can put in?    Thank you

## 2024-08-23 ENCOUNTER — LAB ENCOUNTER (OUTPATIENT)
Dept: LAB | Age: 74
End: 2024-08-23
Attending: INTERNAL MEDICINE
Payer: COMMERCIAL

## 2024-08-23 DIAGNOSIS — I10 ESSENTIAL HYPERTENSION WITH GOAL BLOOD PRESSURE LESS THAN 130/80: ICD-10-CM

## 2024-08-23 DIAGNOSIS — D64.9 ANEMIA, UNSPECIFIED TYPE: ICD-10-CM

## 2024-08-23 DIAGNOSIS — R73.03 PREDIABETES: ICD-10-CM

## 2024-08-23 LAB
ALBUMIN SERPL-MCNC: 4.3 G/DL (ref 3.2–4.8)
ALBUMIN/GLOB SERPL: 1.3 {RATIO} (ref 1–2)
ALP LIVER SERPL-CCNC: 108 U/L
ALT SERPL-CCNC: <7 U/L
ANION GAP SERPL CALC-SCNC: 8 MMOL/L (ref 0–18)
AST SERPL-CCNC: 12 U/L (ref ?–34)
BASOPHILS # BLD AUTO: 0.04 X10(3) UL (ref 0–0.2)
BASOPHILS NFR BLD AUTO: 0.8 %
BILIRUB SERPL-MCNC: 0.5 MG/DL (ref 0.2–1.1)
BUN BLD-MCNC: 41 MG/DL (ref 9–23)
BUN/CREAT SERPL: 19.5 (ref 10–20)
CALCIUM BLD-MCNC: 9.1 MG/DL (ref 8.7–10.4)
CHLORIDE SERPL-SCNC: 112 MMOL/L (ref 98–112)
CHOLEST SERPL-MCNC: 176 MG/DL (ref ?–200)
CO2 SERPL-SCNC: 24 MMOL/L (ref 21–32)
CREAT BLD-MCNC: 2.1 MG/DL
DEPRECATED HBV CORE AB SER IA-ACNC: 50.7 NG/ML
DEPRECATED RDW RBC AUTO: 49.3 FL (ref 35.1–46.3)
EGFRCR SERPLBLD CKD-EPI 2021: 33 ML/MIN/1.73M2 (ref 60–?)
EOSINOPHIL # BLD AUTO: 0.26 X10(3) UL (ref 0–0.7)
EOSINOPHIL NFR BLD AUTO: 5.5 %
ERYTHROCYTE [DISTWIDTH] IN BLOOD BY AUTOMATED COUNT: 16.3 % (ref 11–15)
EST. AVERAGE GLUCOSE BLD GHB EST-MCNC: 123 MG/DL (ref 68–126)
FASTING PATIENT LIPID ANSWER: YES
FASTING STATUS PATIENT QL REPORTED: YES
FOLATE SERPL-MCNC: 13 NG/ML (ref 5.4–?)
GLOBULIN PLAS-MCNC: 3.3 G/DL (ref 2–3.5)
GLUCOSE BLD-MCNC: 96 MG/DL (ref 70–99)
HBA1C MFR BLD: 5.9 % (ref ?–5.7)
HCT VFR BLD AUTO: 29.7 %
HDLC SERPL-MCNC: 42 MG/DL (ref 40–59)
HGB BLD-MCNC: 9 G/DL
IMM GRANULOCYTES # BLD AUTO: 0.01 X10(3) UL (ref 0–1)
IMM GRANULOCYTES NFR BLD: 0.2 %
IRON SATN MFR SERPL: 7 %
IRON SERPL-MCNC: 23 UG/DL
LDLC SERPL CALC-MCNC: 121 MG/DL (ref ?–100)
LYMPHOCYTES # BLD AUTO: 1.03 X10(3) UL (ref 1–4)
LYMPHOCYTES NFR BLD AUTO: 21.6 %
MCH RBC QN AUTO: 24.7 PG (ref 26–34)
MCHC RBC AUTO-ENTMCNC: 30.3 G/DL (ref 31–37)
MCV RBC AUTO: 81.6 FL
MONOCYTES # BLD AUTO: 0.43 X10(3) UL (ref 0.1–1)
MONOCYTES NFR BLD AUTO: 9 %
NEUTROPHILS # BLD AUTO: 3 X10 (3) UL (ref 1.5–7.7)
NEUTROPHILS # BLD AUTO: 3 X10(3) UL (ref 1.5–7.7)
NEUTROPHILS NFR BLD AUTO: 62.9 %
NONHDLC SERPL-MCNC: 134 MG/DL (ref ?–130)
OSMOLALITY SERPL CALC.SUM OF ELEC: 308 MOSM/KG (ref 275–295)
PLATELET # BLD AUTO: 191 10(3)UL (ref 150–450)
POTASSIUM SERPL-SCNC: 4.8 MMOL/L (ref 3.5–5.1)
PROT SERPL-MCNC: 7.6 G/DL (ref 5.7–8.2)
RBC # BLD AUTO: 3.64 X10(6)UL
SODIUM SERPL-SCNC: 144 MMOL/L (ref 136–145)
TIBC SERPL-MCNC: 311 UG/DL (ref 250–425)
TRANSFERRIN SERPL-MCNC: 209 MG/DL (ref 215–365)
TRIGL SERPL-MCNC: 71 MG/DL (ref 30–149)
VIT B12 SERPL-MCNC: 492 PG/ML (ref 211–911)
VLDLC SERPL CALC-MCNC: 12 MG/DL (ref 0–30)
WBC # BLD AUTO: 4.8 X10(3) UL (ref 4–11)

## 2024-08-23 PROCEDURE — 83036 HEMOGLOBIN GLYCOSYLATED A1C: CPT

## 2024-08-23 PROCEDURE — 36415 COLL VENOUS BLD VENIPUNCTURE: CPT

## 2024-08-23 PROCEDURE — 85025 COMPLETE CBC W/AUTO DIFF WBC: CPT

## 2024-08-23 PROCEDURE — 82728 ASSAY OF FERRITIN: CPT

## 2024-08-23 PROCEDURE — 83540 ASSAY OF IRON: CPT

## 2024-08-23 PROCEDURE — 82746 ASSAY OF FOLIC ACID SERUM: CPT

## 2024-08-23 PROCEDURE — 82607 VITAMIN B-12: CPT

## 2024-08-23 PROCEDURE — 84466 ASSAY OF TRANSFERRIN: CPT

## 2024-08-23 PROCEDURE — 80061 LIPID PANEL: CPT

## 2024-08-23 PROCEDURE — 80053 COMPREHEN METABOLIC PANEL: CPT

## 2024-08-29 ENCOUNTER — ANESTHESIA EVENT (OUTPATIENT)
Dept: ENDOSCOPY | Facility: HOSPITAL | Age: 74
End: 2024-08-29
Payer: COMMERCIAL

## 2024-08-29 ENCOUNTER — HOSPITAL ENCOUNTER (OUTPATIENT)
Facility: HOSPITAL | Age: 74
Setting detail: HOSPITAL OUTPATIENT SURGERY
Discharge: HOME | End: 2024-08-29
Attending: INTERNAL MEDICINE | Admitting: INTERNAL MEDICINE
Payer: COMMERCIAL

## 2024-08-29 ENCOUNTER — ANESTHESIA (OUTPATIENT)
Dept: ENDOSCOPY | Facility: HOSPITAL | Age: 74
End: 2024-08-29
Payer: COMMERCIAL

## 2024-08-29 VITALS
OXYGEN SATURATION: 98 % | HEART RATE: 52 BPM | BODY MASS INDEX: 24.52 KG/M2 | SYSTOLIC BLOOD PRESSURE: 166 MMHG | HEIGHT: 72 IN | WEIGHT: 181 LBS | DIASTOLIC BLOOD PRESSURE: 80 MMHG | RESPIRATION RATE: 16 BRPM

## 2024-08-29 DIAGNOSIS — K25.4 GASTRIC ULCER WITH HEMORRHAGE, UNSPECIFIED CHRONICITY: ICD-10-CM

## 2024-08-29 DIAGNOSIS — D64.9 ANEMIA, UNSPECIFIED TYPE: ICD-10-CM

## 2024-08-29 PROCEDURE — 0DB78ZX EXCISION OF STOMACH, PYLORUS, VIA NATURAL OR ARTIFICIAL OPENING ENDOSCOPIC, DIAGNOSTIC: ICD-10-PCS | Performed by: INTERNAL MEDICINE

## 2024-08-29 PROCEDURE — 43239 EGD BIOPSY SINGLE/MULTIPLE: CPT | Performed by: INTERNAL MEDICINE

## 2024-08-29 RX ORDER — LIDOCAINE HYDROCHLORIDE 10 MG/ML
INJECTION, SOLUTION EPIDURAL; INFILTRATION; INTRACAUDAL; PERINEURAL AS NEEDED
Status: DISCONTINUED | OUTPATIENT
Start: 2024-08-29 | End: 2024-08-29 | Stop reason: SURG

## 2024-08-29 RX ORDER — SODIUM CHLORIDE, SODIUM LACTATE, POTASSIUM CHLORIDE, CALCIUM CHLORIDE 600; 310; 30; 20 MG/100ML; MG/100ML; MG/100ML; MG/100ML
INJECTION, SOLUTION INTRAVENOUS CONTINUOUS
Status: DISCONTINUED | OUTPATIENT
Start: 2024-08-29 | End: 2024-08-29

## 2024-08-29 RX ORDER — NALOXONE HYDROCHLORIDE 0.4 MG/ML
0.08 INJECTION, SOLUTION INTRAMUSCULAR; INTRAVENOUS; SUBCUTANEOUS ONCE AS NEEDED
Status: DISCONTINUED | OUTPATIENT
Start: 2024-08-29 | End: 2024-08-29

## 2024-08-29 RX ADMIN — SODIUM CHLORIDE, SODIUM LACTATE, POTASSIUM CHLORIDE, CALCIUM CHLORIDE: 600; 310; 30; 20 INJECTION, SOLUTION INTRAVENOUS at 08:23:00

## 2024-08-29 RX ADMIN — LIDOCAINE HYDROCHLORIDE 120 MG: 10 INJECTION, SOLUTION EPIDURAL; INFILTRATION; INTRACAUDAL; PERINEURAL at 08:28:00

## 2024-08-29 RX ADMIN — SODIUM CHLORIDE, SODIUM LACTATE, POTASSIUM CHLORIDE, CALCIUM CHLORIDE: 600; 310; 30; 20 INJECTION, SOLUTION INTRAVENOUS at 08:40:00

## 2024-08-29 NOTE — OPERATIVE REPORT
ESOPHAGOGASTRODUODENOSCOPY (EGD) REPORT    Ashley Matos     1950 Age 74 year old   PCP Marcie Delaney MD Endoscopist Racquel Payne MD       Date of procedure: 24    Procedure: EGD w/ biopsies     Pre-operative diagnosis: hx gastric ulcers, anemia    Post-operative diagnosis: gastric ulcer     Medications: MAC    Complications: none    Procedure:  Informed consent was obtained from the patient after the risks of the procedure were discussed, including but not limited to bleeding, perforation, aspiration, infection, or possibility of a missed lesion. After discussions of the risks/benefits and alternatives to this procedure, as well as the planned sedation, the patient was placed in the left lateral decubitus position and begun on continuous blood pressure pulse oximetry and EKG monitoring and this was maintained throughout the procedure. Once an adequate level of sedation was obtained a bite block was placed. Then the lubricated tip of the [id number] diagnostic video upper endoscope was inserted and advanced using direct visualization into the posterior pharynx and ultimately into the esophagus. The scope was then advanced through the stomach and to the second portion of the duodenum.    Complications: None    Findings:      1. Esophagus: The squamocolumnar junction was noted at 45 cm and appeared regular. The diaphragmatic pinch was noted noted at 45 cm from the incisors. The esophageal mucosa appeared normal. There was no endoscopic findings of esophagitis, stricture or Phillips's esophagus.    2. Stomach: The stomach distended normally. Normal rugal folds were seen. The pylorus was patent. Retroflexion revealed a normal fundus. There was a small clean-based ulcer noted at the incisura. This was biopsied. Biopsies were also taken with a cold forceps from the antrum, incisura and body for histology.     3. Duodenum: The duodenal mucosa appeared normal in the bulb and 2nd portion of  the duodenum.     Impression:  -Esophagus: Normal.   -Stomach: Small, clean-based ulcer at the incisura, biopsied. Biopsies also obtained for h pylori.   -Duodenum: Normal.     Recommend:  1. Await pathology.   2. Continue esomeprazole twice a day.   3. Avoid NSAIDs (motrin, ibuprofen, aleve, advil, naproxen, midol, naprosyn, excedrin).     >>>If tissue was sampled/removed and you have not received your pathology results either by phone or letter within 2 weeks, please call our office at [phone].    Specimens:  Gastric ulcer, gastric biopsies     Blood loss: <1 ml

## 2024-08-29 NOTE — ANESTHESIA PREPROCEDURE EVALUATION
Anesthesia PreOp Note    HPI:     Ashley Matos is a 74 year old male who presents for preoperative consultation requested by: Racquel aPyne MD    Date of Surgery: 8/29/2024    Procedure(s):  ESOPHAGOGASTRODUODENOSCOPY (EGD)  Indication: Gastric ulcer with hemorrhage, unspecified chronicity   Anemia, unspecified type    Relevant Problems   No relevant active problems       NPO:                         History Review:  Patient Active Problem List    Diagnosis Date Noted    Positive PARTHA (antinuclear antibody) 08/16/2024    Chronic pain of both knees 08/16/2024    Anemia 08/13/2024    Gastric ulcer with hemorrhage 02/21/2024    Benign prostatic hyperplasia without lower urinary tract symptoms 02/21/2024    Essential hypertension with goal blood pressure less than 130/80 02/21/2024    Gastroesophageal reflux disease without esophagitis 02/21/2024    Recurrent pain of right knee 02/21/2024       Past Medical History:    High blood pressure    History of stomach ulcers       History reviewed. No pertinent surgical history.    Medications Prior to Admission   Medication Sig Dispense Refill Last Dose    Esomeprazole Magnesium 40 MG Oral Capsule Delayed Release Take 1 capsule (40 mg total) by mouth 2 (two) times daily before meals. 60 capsule 3     Ferrous Sulfate (IRON) 325 (65 Fe) MG Oral Tab Take 1 tablet by mouth every other day. 90 tablet 3     hydrALAZINE 25 MG Oral Tab Take 1 tablet (25 mg total) by mouth 3 (three) times daily. 60 tablet 0     metoprolol tartrate 25 MG Oral Tab Take 1 tablet (25 mg total) by mouth Q12H. 180 tablet 0     Misc Natural Products (PUMPKIN SEED OIL OR) Take 1 capsule by mouth daily.       tamsulosin 0.4 MG Oral Cap Take 2 capsules (0.8 mg total) by mouth nightly. TAKE AT BEDTIME        No current Epic-ordered facility-administered medications on file.     No current Epic-ordered outpatient medications on file.       No Known Allergies    History reviewed. No pertinent  family history.  Social History     Socioeconomic History    Marital status:    Tobacco Use    Smoking status: Former     Types: Cigarettes    Smokeless tobacco: Never   Vaping Use    Vaping status: Never Used   Substance and Sexual Activity    Alcohol use: Never    Drug use: Never       Available pre-op labs reviewed.  Lab Results   Component Value Date    WBC 4.8 08/23/2024    RBC 3.64 (L) 08/23/2024    HGB 9.0 (L) 08/23/2024    HCT 29.7 (L) 08/23/2024    MCV 81.6 08/23/2024    MCH 24.7 (L) 08/23/2024    MCHC 30.3 (L) 08/23/2024    RDW 16.3 (H) 08/23/2024    .0 08/23/2024     Lab Results   Component Value Date     08/23/2024    K 4.8 08/23/2024     08/23/2024    CO2 24.0 08/23/2024    BUN 41 (H) 08/23/2024    CREATSERUM 2.10 (H) 08/23/2024    GLU 96 08/23/2024    CA 9.1 08/23/2024          Vital Signs:  Body mass index is 24.55 kg/m².   height is 1.829 m (6') and weight is 82.1 kg (181 lb).   Vitals:    08/22/24 1200   Weight: 82.1 kg (181 lb)   Height: 1.829 m (6')        Anesthesia Evaluation     Patient summary reviewed and Nursing notes reviewed    Airway   Mallampati: II  TM distance: >3 FB  Neck ROM: full  Dental - Dentition appears grossly intact   (+) upper dentures and lower dentures    Pulmonary - negative ROS and normal exam   Cardiovascular - normal exam  (+) hypertension    ECG reviewed    Neuro/Psych - negative ROS     GI/Hepatic/Renal    (+) GERD    Endo/Other    (+) blood dyscrasia  Abdominal  - normal exam                 Anesthesia Plan:   ASA:  2  Plan:   MAC and general      I have informed Ashley Matos and/or legal guardian or family member of the nature of the anesthetic plan, benefits, risks including possible dental damage if relevant, major complications, and any alternative forms of anesthetic management.   All of the patient's questions were answered to the best of my ability. The patient desires the anesthetic management as planned.  Eve Gonzalez,  CRNA  8/29/2024 7:28 AM  Present on Admission:  **None**

## 2024-08-29 NOTE — ANESTHESIA POSTPROCEDURE EVALUATION
Patient: Ashley Matos    Procedure Summary       Date: 08/29/24 Room / Location: [facility] ENDOSCOPY 05 / [facility] ENDOSCOPY    Anesthesia Start: 0823 Anesthesia Stop:     Procedure: ESOPHAGOGASTRODUODENOSCOPY (EGD) Diagnosis:       Gastric ulcer with hemorrhage, unspecified chronicity      Anemia, unspecified type      (Gastric ulcer with hemorrhage, unspecified chronicity )      (Anemia, unspecified type)    Surgeons: Racquel Payne MD Anesthesiologist: Eve Gonzalez CRNA    Anesthesia Type: MAC, general ASA Status: 2            Anesthesia Type: MAC, general    Vitals Value Taken Time   /62 08/29/24 0845   Temp 97 08/29/24 0848   Pulse 53 08/29/24 0847   Resp 14 08/29/24 0847   SpO2 98 % 08/29/24 0847   Vitals shown include unfiled device data.    [facility] AN Post Evaluation:   Patient Evaluated in PACU  Patient Participation: complete - patient participated  Level of Consciousness: sleepy but conscious  Pain Management: adequate  Airway Patency:patent  Dental exam unchanged from preop  Yes    Nausea/Vomiting: none  Cardiovascular Status: acceptable and hemodynamically stable  Respiratory Status: acceptable and spontaneous ventilation  Postoperative Hydration acceptable      Eve Gonzalez CRNA  8/29/2024 8:48 AM

## 2024-08-29 NOTE — DISCHARGE INSTRUCTIONS
Home Care Instructions for Gastroscopy with Sedation    Diet:  - Resume your regular diet as tolerated unless otherwise instructed.  - Start with light meals to minimize bloating.  - Do not drink alcohol today.    Medication:  - If you have questions about resuming your normal medications, please contact your Primary Care Physician.    Activities:  - Take it easy today. Do not return to work today.  - Do not drive today.  - Do not operate any machinery today (including kitchen equipment).    Gastroscopy:  - You may have a sore throat for 2-3 days following the exam. This is normal. Gargling with warm salt water (1/2 tsp salt to 1 glass warm water) or using throat lozenges will help.  - If you experience any sharp pain in your neck, abdomen or chest, vomiting of blood, oral temperature over 100 degrees Fahrenheit, light-headedness or dizziness, or any other problems, contact your doctor.    **If unable to reach your doctor, please go to the [facility] Emergency Room**    - Your referring physician will receive a full report of your examination.  - If you do not hear from your doctor's office within two weeks of your biopsy, please call them for your results.    You may be able to see your laboratory results in [place] between 4 and 7 business days.  In some cases, your physician may not have viewed the results before they are released to [place].  If you have questions regarding your results contact the physician who ordered the test/exam by phone or via [facility] by choosing \"Ask a Medical Question.\"

## 2024-08-29 NOTE — H&P
History & Physical Examination    Patient Name: Ashley Matos  MRN: [id number]  [facility]: [id number]  YOB: 1947    Diagnosis: hx gastric ulcers, anemia, surveillance EGD      Medications Prior to Admission   Medication Sig Dispense Refill Last Dose    Esomeprazole Magnesium 40 MG Oral Capsule Delayed Release Take 1 capsule (40 mg total) by mouth 2 (two) times daily before meals. 60 capsule 3     Ferrous Sulfate (IRON) 325 (65 Fe) MG Oral Tab Take 1 tablet by mouth every other day. 90 tablet 3     hydrALAZINE 25 MG Oral Tab Take 1 tablet (25 mg total) by mouth 3 (three) times daily. 60 tablet 0     metoprolol tartrate 25 MG Oral Tab Take 1 tablet (25 mg total) by mouth Q12H. 180 tablet 0     Misc Natural Products (PUMPKIN SEED OIL OR) Take 1 capsule by mouth daily.       tamsulosin 0.4 MG Oral Cap Take 2 capsules (0.8 mg total) by mouth nightly. TAKE AT BEDTIME        No current facility-administered medications for this encounter.       Allergies: No Known Allergies    Past Medical History:    High blood pressure    History of stomach ulcers     History reviewed. No pertinent surgical history.  History reviewed. No pertinent family history.  Social History     Tobacco Use    Smoking status: Former     Types: Cigarettes    Smokeless tobacco: Never   Substance Use Topics    Alcohol use: Never       SYSTEM Check if Review is Normal Check if Physical Exam is Normal If not normal, please explain:   HEENT [X ] [ X]    NECK  [X ] [ X]    HEART [X ] [ X]    LUNGS [X ] [ X]    ABDOMEN [X ] [ X]    EXTREMITIES [X ] [ X]    OTHER        I have discussed the risks and benefits and alternatives of the procedure with the patient/family.  They understand and agree to proceed with plan of care.   I have reviewed the History and Physical done within the last 30 days.  Any changes noted above.    Racquel Payne MD

## 2024-08-30 ENCOUNTER — TELEPHONE (OUTPATIENT)
Facility: CLINIC | Age: 74
End: 2024-08-30

## 2024-08-30 NOTE — PROGRESS NOTES
Tried to call pt with phoebe  and daughter - no answer by either. Left VM to call office for results. His gastric biopsies were benign with no findings of h pylori and no concerning cells for cancer. Please have pt follow-up in clinic with me in 2-3 months. He should take esomeprazole BID until then. Thanks!

## 2024-08-30 NOTE — TELEPHONE ENCOUNTER
----- Message from Racquel Payne sent at 8/30/2024  2:18 PM CDT -----  Tried to call pt with phoebe  and daughter - no answer by either. Left VM to call office for results. His gastric biopsies were benign with no findings of h pylori and no concerning cells for cancer. Please have pt follow-up in clinic with me in 2-3 months. He should take esomeprazole BID until then. Thanks!

## 2024-09-03 NOTE — TELEPHONE ENCOUNTER
Called and spoke to the patient with Korean language line  Thomas ID#[id number]. He verified his date of birth and name.    MD message from 8/30/2024    Your Appointments      Tuesday October 22, 2024 10:00 AM  Follow Up Visit with Racquel Payne MD  [address] [address] [phone]       At his request, a letter was mailed to his address with the follow up appointment information.    He stated he has been taking esomeprazole twice a day.

## 2024-09-29 NOTE — TELEPHONE ENCOUNTER
Please review. Protocol Failed or has No Protocol.    Requested Prescriptions   Pending Prescriptions Disp Refills    HYDRALAZINE 25 MG Oral Tab [Pharmacy Med Name: hydrALAZINE HCl 25 MG Oral Tablet] 60 tablet 0     Sig: TAKE 1 TABLET BY MOUTH THREE TIMES DAILY       Hypertension Medications Protocol Failed - 9/23/2024  9:04 AM        Failed - Last BP reading less than 140/90     BP Readings from Last 1 Encounters:   08/29/24 (!) 166/80               Failed - EGFRCR or GFRNAA > 50     GFR Evaluation  EGFRCR: 33 , resulted on 8/23/2024          Passed - CMP or BMP in past 12 months        Passed - In person appointment or virtual visit in the past 12 mos or appointment in next 3 mos     Recent Outpatient Visits              1 month ago Gastric ulcer with hemorrhage, unspecified chronicity    [facility] Racquel Payne MD    Office Visit    1 month ago Positive PARTHA (antinuclear antibody)    [facility] [address], [place] Harvey Headley DO    Office Visit    1 month ago Essential hypertension with goal blood pressure less than 130/80    [facility], [address], [place] Marcie Delaney MD    Office Visit    2 months ago Essential hypertension with goal blood pressure [facility] than 130/80    [facility]    [facility]    4 months ago Other iron deficiency anemia    [facility], [address], [place] Marcie Delaney MD    Office Visit          Future Appointments         Provider Department Appt Notes    In 3 weeks Racquel Payne MD [facility] dst/follow up    In 3 weeks Marcie Delaney MD [facility], [address], [place] BP Check per dr SHEARER                         Future Appointments         Provider Department Appt Notes    In 3 weeks Racquel Payne  MD [facility] dst/follow up    In 3 weeks Marcie Delaney MD [facility], [address], [place] BP Check per dr SHEARER            Recent Outpatient Visits              1 month ago Gastric ulcer with hemorrhage, unspecified chronicity    [facility] Racquel Payne MD    Office Visit    1 month ago Positive PARTHA (antinuclear antibody)    [facility], [address], [place] Harvey Headley DO    Office Visit    1 month ago Essential hypertension with goal blood pressure less than 130/80    [facility]    [facility]    2 months ago Essential hypertension with goal blood pressure less than 130/80    [facility] [place] Vukomanovic, Emela, MD    Office Visit    4 months ago [facility] iron deficiency anemia    [facility]    [facility] Visit

## 2024-10-02 RX ORDER — HYDRALAZINE HYDROCHLORIDE 25 MG/1
25 TABLET, FILM COATED ORAL 3 TIMES DAILY
Qty: 270 TABLET | Refills: 0 | Status: SHIPPED | OUTPATIENT
Start: 2024-10-02

## 2024-10-22 ENCOUNTER — OFFICE VISIT (OUTPATIENT)
Dept: GASTROENTEROLOGY | Facility: CLINIC | Age: 74
End: 2024-10-22
Payer: COMMERCIAL

## 2024-10-22 ENCOUNTER — OFFICE VISIT (OUTPATIENT)
Dept: INTERNAL MEDICINE CLINIC | Facility: CLINIC | Age: 74
End: 2024-10-22

## 2024-10-22 ENCOUNTER — TELEPHONE (OUTPATIENT)
Dept: GASTROENTEROLOGY | Facility: CLINIC | Age: 74
End: 2024-10-22

## 2024-10-22 VITALS
BODY MASS INDEX: 24.11 KG/M2 | HEIGHT: 72 IN | DIASTOLIC BLOOD PRESSURE: 64 MMHG | WEIGHT: 178 LBS | SYSTOLIC BLOOD PRESSURE: 158 MMHG

## 2024-10-22 VITALS
HEART RATE: 58 BPM | SYSTOLIC BLOOD PRESSURE: 178 MMHG | BODY MASS INDEX: 24.65 KG/M2 | HEIGHT: 72 IN | WEIGHT: 182 LBS | DIASTOLIC BLOOD PRESSURE: 78 MMHG

## 2024-10-22 DIAGNOSIS — D50.0 IRON DEFICIENCY ANEMIA DUE TO CHRONIC BLOOD LOSS: ICD-10-CM

## 2024-10-22 DIAGNOSIS — K25.0 ACUTE GASTRIC ULCER WITH HEMORRHAGE: Primary | ICD-10-CM

## 2024-10-22 DIAGNOSIS — Z91.89 AT RISK FOR ALLERGIC REACTION TO MEDICATION: ICD-10-CM

## 2024-10-22 DIAGNOSIS — N18.30 STAGE 3 CHRONIC KIDNEY DISEASE, UNSPECIFIED WHETHER STAGE 3A OR 3B CKD (HCC): ICD-10-CM

## 2024-10-22 DIAGNOSIS — R73.03 PREDIABETES: ICD-10-CM

## 2024-10-22 DIAGNOSIS — D50.8 OTHER IRON DEFICIENCY ANEMIA: ICD-10-CM

## 2024-10-22 DIAGNOSIS — R21 RASH: ICD-10-CM

## 2024-10-22 DIAGNOSIS — Z12.11 SCREEN FOR COLON CANCER: ICD-10-CM

## 2024-10-22 DIAGNOSIS — L30.9 DERMATITIS: ICD-10-CM

## 2024-10-22 DIAGNOSIS — I10 ESSENTIAL HYPERTENSION WITH GOAL BLOOD PRESSURE LESS THAN 130/80: Primary | ICD-10-CM

## 2024-10-22 DIAGNOSIS — T78.40XA ALLERGIC REACTION, INITIAL ENCOUNTER: ICD-10-CM

## 2024-10-22 PROCEDURE — 3008F BODY MASS INDEX DOCD: CPT | Performed by: INTERNAL MEDICINE

## 2024-10-22 PROCEDURE — 3077F SYST BP >= 140 MM HG: CPT | Performed by: INTERNAL MEDICINE

## 2024-10-22 PROCEDURE — 3078F DIAST BP <80 MM HG: CPT | Performed by: INTERNAL MEDICINE

## 2024-10-22 PROCEDURE — 99215 OFFICE O/P EST HI 40 MIN: CPT | Performed by: INTERNAL MEDICINE

## 2024-10-22 PROCEDURE — 99214 OFFICE O/P EST MOD 30 MIN: CPT | Performed by: INTERNAL MEDICINE

## 2024-10-22 RX ORDER — TRIAMCINOLONE ACETONIDE 1 MG/G
CREAM TOPICAL 2 TIMES DAILY PRN
Qty: 60 G | Refills: 3 | Status: SHIPPED
Start: 2024-10-22

## 2024-10-22 RX ORDER — AMLODIPINE BESYLATE 5 MG/1
5 TABLET ORAL DAILY
Qty: 90 TABLET | Refills: 0 | Status: SHIPPED | OUTPATIENT
Start: 2024-10-22 | End: 2025-10-17

## 2024-10-22 NOTE — PROGRESS NOTES
[facility] - Gastroenterology                                                                                                      Clinic Follow-up Visit    Chief Complaint   Patient presents with    Follow - Up         Subjective/HPI:   Initial OV 08/2024:   Ashley Matos is a 74 year old man with history of hypertension, gastric ulcers, anemia presenting for evaluation of gastric ulcers and anemia.     He has history of multiple gastric ulcers.  He previously had EGD after he had abdominal imaging demonstrating changes concerning for gastric ulcer.  At that time he was having significant abdominal pain.  EGD report unavailable, path report benign without H. pylori.  He was supposed to have follow-up EGD 3 months later, but he did not present for this procedure.  He had recent labs demonstrating anemia with hemoglobin 8.9 and microcytosis.  He has not had recent iron studies.  He is not on iron supplementation.  He previously had a colonoscopy 8 to 10 years ago that was reportedly \"abnormal\".  The family does not know any more details about this.  I recommended repeating an EGD and also colonoscopy.  The patient does not want a colonoscopy at this time.  I recommended that we obtain his records from [place], [place] to see what was previously read as abnormal.  They are unsure at the facility name where he had this done, recommended they find this out and let the GI office know.  We will plan to just perform an EGD per patient's request.  I also recommended that he complete iron studies that were previously ordered by his PCP and start an iron supplement every other day.  He should take MiraLAX with the iron supplement to avoid constipation.  He has not been 100% compliant with the omeprazole.  He feels this gives him muscle aches in his bilateral lower extremities, causes voice changes, and makes him tired.  We  can try a different PPI to see if he has less side effects.     Recommendations:  Get your labs checked.   Start taking an iron supplement every other day. Take miralax once daily with the iron as it may cause constipation. This is a powder you mix with water.      1. Schedule an upper endoscopy (EGD) with MAC [Diagnosis: history of gastric ulcers, anemia]    [facility] CTAP 12/2023  FINDINGS:     Visible portion of the chest: Thickening of the distal esophageal walls is   consistent with esophagitis.  The visible inferior portion of the heart is   at the upper limit of normal for size.  Coronary arterial calcification is   seen.  Peribronchial thickening can be seen with bronchitis, and there is   also groundglass density and mild interlobular septal line thickening at   the visible lung bases which can be seen with edema or atypical/viral   infection.     Hepatobiliary: The liver is normal in size and contour.  Mild focal fatty   infiltration is seen at the falciform ligament.  The portal and hepatic   venous systems are patent.  The gallbladder is partially distended without   surrounding inflammatory change.  There is no biliary ductal dilation.   Small amount of perihepatic fluid is seen.     Spleen: Normal size and contour.  No mass.  Small amount of perisplenic   fluid is seen.     Pancreas: Mild peripancreatic inflammatory change is seen near the stomach.    No ductal dilation or mass is seen.  There is a periampullary duodenal   diverticulum containing predominantly air.     Adrenal glands: Mildly thickened bilaterally.     Kidneys: Decreased enhancement of the kidneys appears symmetrical.  There   is bilateral perinephric fat stranding.  Moderate to severe bilateral   hydronephrosis and hydroureter is seen.  Hypoattenuating lesions in the   kidneys are noted, some of which are too small to characterize but   statistically likely small cyst, and the largest of which exophytic from   the posterior lateral lower  pole of the left kidney measuring 5.7 cm x 5.1   cm demonstrates fluid attenuation consistent with cyst.  The ureters are   dilated throughout their course.  No ureteral stone is seen.     Bladder: Distended with wall thickening for the degree of distention.   Bladder diverticuli are noted predominantly at the dome.     Reproductive organs: The prostate gland is enlarged and enhances   heterogeneously.  The seminal vesicles appear normal.  Bilateral hydroceles   are seen.     Aorta and IVC: Normal caliber.  Nondisplaced intimal calcification is seen.     Lymphadenopathy: There are prominent lymph nodes around the stomach   measuring up to 1.6 cm x 1.3 cm.     Stomach and bowel: There is marked gastric wall thickening of low   attenuation likely from edema.  There is a large lesser curvature   ulceration spanning approximately 3.9 cm x 3.6 cm in the axial plane which   appears to contain fluid and gastric contents.  Adjacent fat stranding is   seen.  The gastric wall at this point is not well seen adjacent to the   pancreas.  Small periampullary duodenal diverticulum contains predominantly   air.  There is no small bowel dilation to suggest obstruction.  The   appendix is normal.  Overall small volume of stool is seen in the colon.   Colonic diverticuli are seen without surrounding inflammatory change.     No drainable intra-abdominal abscess is seen.  There is no intraperitoneal   free air.     Indirect right inguinal hernia contains fat and bowel.  Small indirect left   inguinal hernia contains fat.     Musculoskeletal: There is lipoma or focal fatty infiltration at the   proximal right rectus femoris measuring 1.2 cm x 0.5 cm.  Distal to this,   lipomatous lesion only partially included on the field-of-view is seen in   the rectus femoris measuring 3.8 cm x 1.4 cm in the axial plane.  There is   a lipomatous lesion with mineralization measuring 2.8 cm x 1.8 cm   inseparable from the right subtrochanteric femur  medially.  There is lipoma   anterior to the right hip within the subcutaneous fat measuring 8.2 cm x   3.3 cm.  No acute fracture is seen.  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  Mild to moderate bilateral hip osteoarthritis is seen.   There are degenerative changes of the sacroiliac joints and pubic   symphysis.     Impression    1.  Marked gastric wall thickening with large lesser curvature ulceration.  The gastric wall posteriorly adjacent to the pancreas is not well seen and  may be perforated, but no free air is identified.  2.  Regional lymph nodes around the stomach are enlarged and could be  reactive or potentially metastatic.  3.  Moderate to severe bilateral hydroureteronephrosis and distended  bladder are likely from chronic outlet obstruction.  The kidneys  demonstrate decreased enhancement and delayed excretion although they do  excrete contrast.  4.  Perinephric fat stranding may be sequela of obstruction, but urinary  tract infection is possible.  5.  Bladder wall thickening may be in part due to chronic outlet  obstruction given the enlarged prostate gland, but infection is possible  and correlation with urinalysis is recommended.  6.  Multiple lipomas and lipomatous tumors including one adjacent to the  right subtrochanteric femur containing calcification.  Radiographic  follow-up is recommended to ensure stability.  7.  Please see above for additional observations.    He underwent EGD 08/2024:   Findings:       1. Esophagus: The squamocolumnar junction was noted at 45 cm and appeared regular. The diaphragmatic pinch was noted noted at 45 cm from the incisors. The esophageal mucosa appeared normal. There was no endoscopic findings of esophagitis, stricture or Phillips's esophagus.     2. Stomach: The stomach distended normally. Normal rugal folds were seen. The pylorus was patent. Retroflexion revealed a normal  fundus. There was a small clean-based ulcer noted at the incisura. This was biopsied. Biopsies were also taken with a cold forceps from the antrum, incisura and body for histology.      3. Duodenum: The duodenal mucosa appeared normal in the bulb and 2nd portion of the duodenum.      Impression:  -Esophagus: Normal.   -Stomach: Small, clean-based ulcer at the incisura, biopsied. Biopsies also obtained for h pylori.   -Duodenum: Normal.      Recommend:  1. Await pathology.   2. Continue esomeprazole twice a day.   3. Avoid NSAIDs (motrin, ibuprofen, aleve, advil, naproxen, midol, naprosyn, excedrin).     Final Diagnosis:      A. Gastric ulcer; biopsy:  Fragments of gastric antral-type mucosa with mild foveolar hyperplasia and lamina propria fibrosis.  Diff-Quik stain (appropriate control reactivity) shows no evidence of H. pylori-like microorganisms.  No evidence of dysplasia or carcinoma identified.     B. Random gastric biopsy:     Intermixed fragments of gastric oxyntic and antral-type mucosa with mild chronic inactive gastritis and foci of incomplete and complete intestinal metaplasia..  Diff-Quik stain (appropriate control reactivity) shows no definitive evidence of H. pylori-like microorganisms.  No evidence of dysplasia or carcinoma identified.     Today's visit:   He notes that he has not been consistent with taking as esomeprazole.  He notes a rash on his upper extremities and lower extremities that he believes is from one of his medications.  This started about 1 month after starting the PPI.  He has been taking an iron supplement every other day.  He denies constipation.  He notes that abdominal pain has resolved.      Wt Readings from Last 6 Encounters:   10/22/24 178 lb (80.7 kg)   08/22/24 181 lb (82.1 kg)   08/20/24 181 lb (82.1 kg)   08/16/24 180 lb (81.6 kg)   08/13/24 181 lb (82.1 kg)   07/03/24 187 lb (84.8 kg)        History, Medications, Allergies, ROS:      Past Medical History:    High blood  pressure    History of stomach ulcers      Past Surgical History:   Procedure Laterality Date    Egd  08/29/2024    Dr. Payne; gastric ulcer      No family history on file.   Social History:   Social History     Socioeconomic History    Marital status:    Tobacco Use    Smoking status: Former     Types: Cigarettes    Smokeless tobacco: Never   Vaping Use    Vaping status: Never Used   Substance and Sexual Activity    Alcohol use: Never    Drug use: Never     Social Drivers of Health     Financial Resource Strain: Low Risk  (12/29/2023)    Received from [facility]    Financial Resource Strain     In the past year, have you or any family members you live with been unable to get any of the following when it was really needed? Check all that apply.: None   Food Insecurity: Medium Risk (12/29/2023)    Received from [facility]    Food Insecurity     Within the past 12 months, you worried that your food would run out before you got money to buy more.  : Sometimes true     Within the past 12 months, the food you bought just didn't last and you didn't have money to get more. : Sometimes true   Transportation Needs: Not At Risk (12/29/2023)    Received from [facility], [facility]    Transportation Needs     In the past 12 months, has lack of reliable transportation kept you from medical appointments, meetings, work or from getting things needed for daily living? : No   Physical Activity: High Risk (1/24/2024)    Received from [facility], [facility]    Exercise Vital Sign     On average, how many days per week do you engage in moderate to strenuous exercise (like a brisk walk)?: 0 days     On average, how many minutes do you engage in exercise at this level?: 0 min   Social Connections: Medium Risk (12/29/2023)    Received from [facility]    Social Connections      How often do you see or talk to people that you care about and feel close to? (For example: talking to friends on the phone, visiting friends or family, going to [employer] or club meetings): 1 or 2 times a week        Medications (Active prior to today's visit):  Current Outpatient Medications   Medication Sig Dispense Refill    hydrALAZINE 25 MG Oral Tab Take 1 tablet (25 mg total) by mouth 3 (three) times daily. 270 tablet 0    Esomeprazole Magnesium 40 MG Oral Capsule Delayed Release Take 1 capsule (40 mg total) by mouth 2 (two) times daily before meals. 60 capsule 3    Ferrous Sulfate (IRON) 325 (65 Fe) MG Oral Tab Take 1 tablet by mouth every other day. 90 tablet 3    metoprolol tartrate 25 MG Oral Tab Take 1 tablet (25 mg total) by mouth Q12H. 180 tablet 0    Misc Natural Products (PUMPKIN SEED OIL OR) Take 1 capsule by mouth daily.      tamsulosin 0.4 MG Oral Cap Take 2 capsules (0.8 mg total) by mouth nightly. TAKE AT BEDTIME         Allergies:  Allergies[1]    ROS:   CONSTITUTIONAL:  negative for fevers, chills  EYES:  negative for change in vision  RESPIRATORY:  negative for  shortness of breath  CARDIOVASCULAR:  negative for  chest pain  GASTROINTESTINAL:  see HPI  GENITOURINARY:  negative for dysuria  INTEGUMENT/BREAST:  SKIN:  negative for  rash  ALLERGIC/IMMUNOLOGIC:  negative for hay fever  ENDOCRINE:  negative for cold intolerance and heat intolerance  MUSCULOSKELETAL:  negative for  joint stiffness and joint swelling  BEHAVIOR/PSYCH:  negative for depressed mood    PHYSICAL EXAM:   Blood pressure 158/64, height 6' (1.829 m), weight 178 lb (80.7 kg).    Gen: appears comfortable and in no acute distress  HEENT: sclera appear anicteric, mucus membranes appear moist  CV: the extremities are warm and well-perfused   Lung: no increased work of breathing, no conversational dyspnea   Abd: soft, non-tender exam in all quadrants without rigidity or guarding, non-distended, no masses palpated  Skin: no  jaundice, erythematous and scaly rash involving extremities - no trunk involvement   Neuro: alert and interactive, no focal neuro deficits  Psych: cooperative, normal affect       Labs/Imaging:   [facility] CTAP 12/2023  FINDINGS:     Visible portion of the chest: Thickening of the distal esophageal walls is   consistent with esophagitis.  The visible inferior portion of the heart is   at the upper limit of normal for size.  Coronary arterial calcification is   seen.  Peribronchial thickening can be seen with bronchitis, and there is   also groundglass density and mild interlobular septal line thickening at   the visible lung bases which can be seen with edema or atypical/viral   infection.     Hepatobiliary: The liver is normal in size and contour.  Mild focal fatty   infiltration is seen at the falciform ligament.  The portal and hepatic   venous systems are patent.  The gallbladder is partially distended without   surrounding inflammatory change.  There is no biliary ductal dilation.   Small amount of perihepatic fluid is seen.     Spleen: Normal size and contour.  No mass.  Small amount of perisplenic   fluid is seen.     Pancreas: Mild peripancreatic inflammatory change is seen near the stomach.    No ductal dilation or mass is seen.  There is a periampullary duodenal   diverticulum containing predominantly air.     Adrenal glands: Mildly thickened bilaterally.     Kidneys: Decreased enhancement of the kidneys appears symmetrical.  There   is bilateral perinephric fat stranding.  Moderate to severe bilateral   hydronephrosis and hydroureter is seen.  Hypoattenuating lesions in the   kidneys are noted, some of which are too small to characterize but   statistically likely small cyst, and the largest of which exophytic from   the posterior lateral lower pole of the left kidney measuring 5.7 cm x 5.1   cm demonstrates fluid attenuation consistent with cyst.  The ureters are   dilated throughout their course.  No ureteral  stone is seen.     Bladder: Distended with wall thickening for the degree of distention.   Bladder diverticuli are noted predominantly at the dome.     Reproductive organs: The prostate gland is enlarged and enhances   heterogeneously.  The seminal vesicles appear normal.  Bilateral hydroceles   are seen.     Aorta and IVC: Normal caliber.  Nondisplaced intimal calcification is seen.     Lymphadenopathy: There are prominent lymph nodes around the stomach   measuring up to 1.6 cm x 1.3 cm.     Stomach and bowel: There is marked gastric wall thickening of low   attenuation likely from edema.  There is a large lesser curvature   ulceration spanning approximately 3.9 cm x 3.6 cm in the axial plane which   appears to contain fluid and gastric contents.  Adjacent fat stranding is   seen.  The gastric wall at this point is not well seen adjacent to the   pancreas.  Small periampullary duodenal diverticulum contains predominantly   air.  There is no small bowel dilation to suggest obstruction.  The   appendix is normal.  Overall small volume of stool is seen in the colon.   Colonic diverticuli are seen without surrounding inflammatory change.     No drainable intra-abdominal abscess is seen.  There is no intraperitoneal   free air.     Indirect right inguinal hernia contains fat and bowel.  Small indirect left   inguinal hernia contains fat.     Musculoskeletal: There is lipoma or focal fatty infiltration at the   proximal right rectus femoris measuring 1.2 cm x 0.5 cm.  Distal to this,   lipomatous lesion only partially included on the field-of-view is seen in   the rectus femoris measuring 3.8 cm x 1.4 cm in the axial plane.  There is   a lipomatous lesion with mineralization measuring 2.8 cm x 1.8 cm   inseparable from the right subtrochanteric femur medially.  There is lipoma   anterior to the right hip within the subcutaneous fat measuring 8.2 cm x   3.3 cm.  No acute fracture is seen.  There is mild congenital canal    stenosis from short pedicle morphology with multilevel degenerative disc   and facet disease.  Mild to moderate bilateral hip osteoarthritis is seen.   There are degenerative changes of the sacroiliac joints and pubic   symphysis.     Impression    1.  Marked gastric wall thickening with large lesser curvature ulceration.  The gastric wall posteriorly adjacent to the pancreas is not well seen and  may be perforated, but no free air is identified.  2.  Regional lymph nodes around the stomach are enlarged and could be  reactive or potentially metastatic.  3.  Moderate to severe bilateral hydroureteronephrosis and distended  bladder are likely from chronic outlet obstruction.  The kidneys  demonstrate decreased enhancement and delayed excretion although they do  excrete contrast.  4.  Perinephric fat stranding may be sequela of obstruction, but urinary  tract infection is possible.  5.  Bladder wall thickening may be in part due to chronic outlet  obstruction given the enlarged prostate gland, but infection is possible  and correlation with urinalysis is recommended.  6.  Multiple lipomas and lipomatous tumors including one adjacent to the  right subtrochanteric femur containing calcification.  Radiographic  follow-up is recommended to ensure stability.  7.  Please see above for additional observations.    EGD 08/2024  Findings:       1. Esophagus: The squamocolumnar junction was noted at 45 cm and appeared regular. The diaphragmatic pinch was noted noted at 45 cm from the incisors. The esophageal mucosa appeared normal. There was no endoscopic findings of esophagitis, stricture or Phillips's esophagus.     2. Stomach: The stomach distended normally. Normal rugal folds were seen. The pylorus was patent. Retroflexion revealed a normal fundus. There was a small clean-based ulcer noted at the incisura. This was biopsied. Biopsies were also taken with a cold forceps from the antrum, incisura and body for histology.       3. Duodenum: The duodenal mucosa appeared normal in the bulb and 2nd portion of the duodenum.      Impression:  -Esophagus: Normal.   -Stomach: Small, clean-based ulcer at the incisura, biopsied. Biopsies also obtained for h pylori.   -Duodenum: Normal.      Recommend:  1. Await pathology.   2. Continue esomeprazole twice a day.   3. Avoid NSAIDs (motrin, ibuprofen, aleve, advil, naproxen, midol, naprosyn, excedrin).     Final Diagnosis:      A. Gastric ulcer; biopsy:  Fragments of gastric antral-type mucosa with mild foveolar hyperplasia and lamina propria fibrosis.  Diff-Quik stain (appropriate control reactivity) shows no evidence of H. pylori-like microorganisms.  No evidence of dysplasia or carcinoma identified.     B. Random gastric biopsy:     Intermixed fragments of gastric oxyntic and antral-type mucosa with mild chronic inactive gastritis and foci of incomplete and complete intestinal metaplasia..  Diff-Quik stain (appropriate control reactivity) shows no definitive evidence of H. pylori-like microorganisms.  No evidence of dysplasia or carcinoma identified.       ASSESSMENT/PLAN:   Ashley Matos is a 74 year old man with history of hypertension, gastric ulcers, anemia presenting for follow-up.     He has history of multiple gastric ulcers.  He previously had EGD after he had abdominal imaging demonstrating changes concerning for gastric ulcer.  At that time he was having significant abdominal pain.  EGD report unavailable, path report benign without H. pylori.  He was supposed to have follow-up EGD 3 months later, but he did not present for this procedure.  Labs consistent with iron deficiency anemia.  He previously had a colonoscopy 8 to 10 years ago that was reportedly \"abnormal\".  The family does not know any more details about this.  I recommended repeating an EGD and also colonoscopy.  The patient does not want a colonoscopy at this time.  He underwent EGD in 08/20/2024 that was notable for a  small clean-based ulcer at the incisura.  This was biopsied and was benign.  Pathology reported mild foveolar hyperplasia and lamina propria fibrosis.  Random biopsies were notable for foci of incomplete and complete intestinal metaplasia, no H. pylori.  He previously has side effects from omeprazole use including muscle aches in his bilateral lower extremities, voice changes, and fatigue.  He was started on esomeprazole twice daily.  He has not been consistent with taking this as he has developed a rash to his extremities that he believes is from one of his medications.  This started about 1 month after starting esomeprazole.  He has been taking iron supplementation every other day.  We discussed that he should see his PCP and/or dermatology versus rheumatology regarding his rash.  He does have a history of a positive PARTHA and an elevated double-stranded DNA antibody on his recent labs.  This rash started about 1 month after starting his PPI, so I do not feel it is related.  However, we did discuss that we could trial Pepcid instead of a PPI.  He should repeat his iron studies and CBC.  Given his solitary ulcer, recommended repeat EGD to assess for healing in about 3-6 months.  We can perform gastric mapping at that time for the finding of intestinal metaplasia.     Recommendations:  Get your labs checked.   Start taking an iron supplement every other day. Take miralax once daily with the iron as it may cause constipation. This is a powder you mix with water.      1. Schedule an upper endoscopy (EGD) with MAC [Diagnosis: history of gastric ulcers, anemia]    Recommendations:  Get your rash evaluated by Dr. Delaney and a dermatologist.   Consider pepcid instead of esomperazole.     1. Schedule an upper endoscopy (EGD) with MAC [Diagnosis: follow-up gastric ulcer - assess for healing]    2. Do not eat or drink after midnight the night before your procedure.     3. Medication adjustments: Continue ALL medications  as usual.    4. If you start any NEW medication after your visit today, please notify us. Certain medications will need to be held before the procedure, or the procedure cannot be performed.     5. You will need a ride home from your procedure since you are receiving sedation. Please ensure you will have an available ride home or the procedure cannot be performed.         Orders This Visit:  Orders Placed This Encounter   Procedures    CBC With Differential With Platelet    Ferritin    Iron And Tibc       Meds This Visit:  Requested Prescriptions      No prescriptions requested or ordered in this encounter       Imaging & Referrals:  None     Racquel Payne MD  [facility] Gastroenterology  10/22/2024         [1] No Known Allergies

## 2024-10-22 NOTE — PATIENT INSTRUCTIONS
Get your rash evaluated by Dr. Delaney and a dermatologist.   Consider pepcid instead of esomperazole.     1. Schedule an upper endoscopy (EGD) with [facility] [Diagnosis: follow-up gastric ulcer - assess for healing]    2. Do not eat or drink after midnight the night before your procedure.     3. Medication adjustments: Continue ALL medications as usual.    4. If you start any NEW medication after your visit today, please notify us. Certain medications will need to be held before the procedure, or the procedure cannot be performed.     5. You will need a ride home from your procedure since you are receiving sedation. Please ensure you will have an available ride home or the procedure cannot be performed.

## 2024-10-22 NOTE — PROGRESS NOTES
Subjective:   Patient ID: Ashley Matos is a 74 year old male.  Chief Complaint   Patient presents with    Hypertension    Rash     Stts he's getting an allergic reaction when he's taking his blood pressure medications, but does not know which one is causing it.       Patient is limited with language he speaks Khmer and his daughter is on the phone interpreting-    Patient states has itchy rash of his hands upper arms and lower legs for  4 weeks    State he did thought it might be BP medications so he stoped taking them  for at least for 2 weeks   feels well  , denies concerns or symptoms today denes CP spb palpitations, he is eating well and denies any stomach  pains  heartburns or  any nausea ,vomiting ,diarrhea ,constipation or blood in stool.     Seen Gastoenerologist had colonocopy in 8/24 and EGD -recently and taking  Nexium 40 mg bid   Bp elevated is  today  -pt stoped BP medications week ago denies headache or cp palpitations or stokes   Denies abdominal pain, denies UTI symptoms fever or chills .         Past Medical History:  Diagnosis Date  Difficulty in urination  Enlarged prostate  Essential (primary) hypertension  Gastric ulcer  Gastroesophageal reflux disease  Primary generalized (osteo)arthritis        Current Outpatient Medications  Medication Sig Dispense Refill  tamsulosin (FLOMAX) 0.4 MG Cap Take 0.4 mg by mouth daily.  metoPROLOL tartrate (LOPRESSOR) 25 MG tablet Take 1 tablet by mouth every 12 hours. 60 tablet 3  omeprazole (PriLOSEC) 40 MG capsule Take 1 capsule by mouth in the morning and 1 capsule in the evening. 60 capsule 1  Misc Natural Products (Pumpkin Seed Oil) Cap Take 1 capsule by mouth daily.      ALLERGIES:  No Known Allergies        History/Other:   Review of Systems   Constitutional:  Negative for chills, fatigue and fever.   HENT:  Negative for ear pain and sore throat.    Eyes:  Negative for pain and redness.   Respiratory:  Negative for cough, shortness of breath and  wheezing.    Cardiovascular:  Negative for chest pain, palpitations and leg swelling.   Gastrointestinal:  Negative for abdominal pain, anal bleeding, blood in stool, constipation, diarrhea, nausea, rectal pain and vomiting.        Denies heartburns   Genitourinary:  Negative for dysuria and frequency.   Skin:  Positive for rash. Negative for pallor.   Neurological:  Negative for dizziness and headaches.   Psychiatric/Behavioral:  Negative for confusion and sleep disturbance. The patient is not nervous/anxious.      Current Outpatient Medications   Medication Sig Dispense Refill    triamcinolone 0.1 % External Cream Apply topically 2 (two) times daily as needed. 60 g 3    amLODIPine 5 MG Oral Tab Take 1 tablet (5 mg total) by mouth daily. 90 tablet 0    Esomeprazole Magnesium 40 MG Oral Capsule Delayed Release Take 1 capsule (40 mg total) by mouth 2 (two) times daily before meals. 60 capsule 3    Ferrous Sulfate (IRON) 325 (65 Fe) MG Oral Tab Take 1 tablet by mouth every other day. 90 tablet 3    Misc Natural Products (PUMPKIN SEED OIL OR) Take 1 capsule by mouth daily.      tamsulosin 0.4 MG Oral Cap Take 2 capsules (0.8 mg total) by mouth nightly. TAKE AT BEDTIME      hydrALAZINE 25 MG Oral Tab Take 1 tablet (25 mg total) by mouth 3 (three) times daily. (Patient not taking: Reported on 10/22/2024) 270 tablet 0    metoprolol tartrate 25 MG Oral Tab Take 1 tablet (25 mg total) by mouth Q12H. (Patient not taking: Reported on 10/22/2024) 180 tablet 0     Allergies:No Known Allergies    Objective:   Physical Exam  Vitals and nursing note reviewed.   Constitutional:       General: He is not in acute distress.  HENT:      Head: Normocephalic and atraumatic.   Eyes:      General: No scleral icterus.        Right eye: No discharge.         Left eye: No discharge.   Cardiovascular:      Rate and Rhythm: Normal rate and regular rhythm.      Heart sounds: Normal heart sounds. No murmur heard.  Pulmonary:      Effort:  Pulmonary effort is normal. No respiratory distress.      Breath sounds: Normal breath sounds. No wheezing or rales.   Abdominal:      Palpations: Abdomen is soft. There is no mass.      Tenderness: There is no abdominal tenderness. There is no right CVA tenderness or left CVA tenderness.      Comments: No hepatomegaly, no splenomegaly   Musculoskeletal:      Cervical back: Neck supple.      Right knee: No swelling, erythema, bony tenderness or crepitus. Decreased range of motion.      Right lower leg: No edema.      Left lower leg: No edema.   Lymphadenopathy:      Cervical: No cervical adenopathy.   Skin:     General: Skin is warm and dry.      Findings: Erythema and rash present. Rash is purpuric and scaling. Rash is not pustular or vesicular.          Neurological:      Mental Status: He is alert and oriented to person, place, and time.   Psychiatric:         Behavior: Behavior normal.     Blood pressure (!) 178/78, pulse 58, height 6' (1.829 m), weight 182 lb (82.6 kg).        Assessment & Plan:      Dermatitis  Contact dermatitis ?  Medications less likely ? Localized  rash hands forearms  and lower legs   Avoid  creams lotions or hand sensitizer   Start  triamcinolone cream bid 1-2  weeks thin layer   Avoid gloves  at work   - Derm Referral - In Network   If needed to see Allergist Dr Cholo salamanca on Metoprolol and hydralazine allergies .        Essential hypertension with goal blood   History of atrial flutter  Patient has Holter monitor-showers occasional  PSVT   Stoped metoprolol 25 mg twice daily  and Hydralazine 25   bid  - not taking  for 1 week  due to possible allergic reaction ?  Start Amlodipine 5 mg every day   Side effects and directions of medication discussed with patient. Patient verbalized understanding and compliance   F/u with  cardiologist soon   - Cardio Referral - again   Internal  F/u in  -3 weeks      Benign prostatic hyperplasia without lower urinary tract symptoms  Patient is on  tamsulosin 0.5 mg 2 capsules at night  Stable symptoms continue present management follow-up with urology  Fluids   Stable cpm      Gastroesophageal reflux disease without esophagitis  Patient advised to avoid spicy food, coffee, tea, caffeinated drinks, alcohol, acidic food/juices  Advised to avoid NSAID's - Aspirin-based medications  Advised to avoid wearing  tight clothes   Advised to elevate the head of the bed   Avoid eating at least 3 hours before bedtime   Counseling on ideal weight/BMI  Patient not -Taking  Esomeprazole 40 mg twice daily 30-60  minutes before breakfast and dinner - monitor kidney function might need to change to Famotidine /Pepcid   Side effects and directions of medication discussed with patient. Patient verbalized understanding and compliance.    Stable cpm on diet   Labs  monitor  His gastric biopsies were benign with no findings of h pylori and no concerning cells for cancer.   - Gastro f/u visit          Prediabetes   Decrease sugar and carbohydrate diet   Labs to follow   A1c 6.3 ->-5/9  -8/24        Stage 3 chronic kidney disease, unspecified whether stage 3a or 3b CKD (HCC)  Labs to complete today  Last GFR was in 50s  - 33  Keep with good water hydration and avoid NSAIDs  Refer to Nephrologist  Dr Unger     - Comp Metabolic Panel (14); Future  - Nephrology Referral - In Network     Anemia   likely due to bleeding -gastric  Last hemoglobin on 12/31   -8.0--  5/24 -9.2 --8.9   9.0 - 8/23/24   Labs today to complete  Hematology -Dr Barrett   Referred   If any persistent worsening symptoms patient to go to emergency room    - Oncology/Hematology Referral - [place] (Dinorah Kahn)       Total time spent caring for the patient on the day of the encounter:  40 min  This includes pre-charting, reviewing and obtaining results, exam, plan, notes, and counseling.      Orders Placed This Encounter   Procedures    Comp Metabolic Panel (14)    TSH W Reflex To Free T4    Comp Metabolic Panel  (14)    Hemoglobin A1C    Lipid Panel       Meds This Visit:  Requested Prescriptions     Signed Prescriptions Disp Refills    triamcinolone 0.1 % External Cream 60 g 3     Sig: Apply topically 2 (two) times daily as needed.    amLODIPine 5 MG Oral Tab 90 tablet 0     Sig: Take 1 tablet (5 mg total) by mouth daily.       Imaging & Referrals:  DERM - INTERNAL  ALLERGY - INTERNAL  NEPHROLOGY - INTERNAL  ONCOLOGY/HEMATOLOGY - INTERNAL

## 2024-10-22 NOTE — TELEPHONE ENCOUNTER
Scheduled for: EGD 18852  Provider Name:  Dr. Payne  Date:  3/25/2025  Location:  [facility]  Sedation:  [facility]  Time:  Patient is aware he/she will receive a phone call the day before with the arrival time.  Prep:  NPO after midnight  Meds/Allergies Reconciled?:  Physician reviewed  Diagnosis with codes:  Acute gastric ulcer K25.0  Was patient informed to call insurance with codes (Y/N):  Yes  Referral sent?:  Referral was sent at the time of electronic surgical scheduling.  EM or [facility] notified?:  I sent an electronic request to Endo Scheduling and received a confirmation today.    Medication Orders:  Patient is aware to NOT take iron pills, herbal meds and diet supplements for 7 days before exam. Also to NOT take any form of alcohol, recreational drugs and any forms of ED meds 24-72 hours before exam.   Misc Orders:  N/A     Further instructions given by staff:  Prep instructions were given to pt and family in the office, pt verbalized understanding.

## 2024-10-25 ENCOUNTER — LAB ENCOUNTER (OUTPATIENT)
Dept: LAB | Age: 74
End: 2024-10-25
Attending: INTERNAL MEDICINE
Payer: COMMERCIAL

## 2024-10-25 DIAGNOSIS — K25.0 ACUTE GASTRIC ULCER WITH HEMORRHAGE: ICD-10-CM

## 2024-10-25 DIAGNOSIS — R73.03 PREDIABETES: ICD-10-CM

## 2024-10-25 DIAGNOSIS — D50.0 IRON DEFICIENCY ANEMIA DUE TO CHRONIC BLOOD LOSS: ICD-10-CM

## 2024-10-25 DIAGNOSIS — N18.30 STAGE 3 CHRONIC KIDNEY DISEASE, UNSPECIFIED WHETHER STAGE 3A OR 3B CKD (HCC): ICD-10-CM

## 2024-10-25 DIAGNOSIS — I10 ESSENTIAL HYPERTENSION WITH GOAL BLOOD PRESSURE LESS THAN 130/80: ICD-10-CM

## 2024-10-25 LAB
ALBUMIN SERPL-MCNC: 4.5 G/DL (ref 3.2–4.8)
ALBUMIN/GLOB SERPL: 1.3 {RATIO} (ref 1–2)
ALP LIVER SERPL-CCNC: 128 U/L
ALT SERPL-CCNC: 8 U/L
ANION GAP SERPL CALC-SCNC: 3 MMOL/L (ref 0–18)
AST SERPL-CCNC: 12 U/L (ref ?–34)
BASOPHILS # BLD AUTO: 0.03 X10(3) UL (ref 0–0.2)
BASOPHILS NFR BLD AUTO: 0.5 %
BILIRUB SERPL-MCNC: 0.4 MG/DL (ref 0.2–1.1)
BUN BLD-MCNC: 45 MG/DL (ref 9–23)
BUN/CREAT SERPL: 18.1 (ref 10–20)
CALCIUM BLD-MCNC: 9.4 MG/DL (ref 8.7–10.4)
CHLORIDE SERPL-SCNC: 115 MMOL/L (ref 98–112)
CHOLEST SERPL-MCNC: 185 MG/DL (ref ?–200)
CO2 SERPL-SCNC: 26 MMOL/L (ref 21–32)
CREAT BLD-MCNC: 2.49 MG/DL
DEPRECATED HBV CORE AB SER IA-ACNC: 47 NG/ML
DEPRECATED RDW RBC AUTO: 45.7 FL (ref 35.1–46.3)
EGFRCR SERPLBLD CKD-EPI 2021: 27 ML/MIN/1.73M2 (ref 60–?)
EOSINOPHIL # BLD AUTO: 0.44 X10(3) UL (ref 0–0.7)
EOSINOPHIL NFR BLD AUTO: 7.8 %
ERYTHROCYTE [DISTWIDTH] IN BLOOD BY AUTOMATED COUNT: 14.9 % (ref 11–15)
EST. AVERAGE GLUCOSE BLD GHB EST-MCNC: 120 MG/DL (ref 68–126)
FASTING PATIENT LIPID ANSWER: YES
FASTING STATUS PATIENT QL REPORTED: YES
GLOBULIN PLAS-MCNC: 3.5 G/DL (ref 2–3.5)
GLUCOSE BLD-MCNC: 97 MG/DL (ref 70–99)
HBA1C MFR BLD: 5.8 % (ref ?–5.7)
HCT VFR BLD AUTO: 30.9 %
HDLC SERPL-MCNC: 38 MG/DL (ref 40–59)
HGB BLD-MCNC: 9.4 G/DL
IMM GRANULOCYTES # BLD AUTO: 0.01 X10(3) UL (ref 0–1)
IMM GRANULOCYTES NFR BLD: 0.2 %
IRON SATN MFR SERPL: 10 %
IRON SERPL-MCNC: 36 UG/DL
LDLC SERPL CALC-MCNC: 133 MG/DL (ref ?–100)
LYMPHOCYTES # BLD AUTO: 1.12 X10(3) UL (ref 1–4)
LYMPHOCYTES NFR BLD AUTO: 20 %
MCH RBC QN AUTO: 25.5 PG (ref 26–34)
MCHC RBC AUTO-ENTMCNC: 30.4 G/DL (ref 31–37)
MCV RBC AUTO: 83.7 FL
MONOCYTES # BLD AUTO: 0.44 X10(3) UL (ref 0.1–1)
MONOCYTES NFR BLD AUTO: 7.8 %
NEUTROPHILS # BLD AUTO: 3.57 X10 (3) UL (ref 1.5–7.7)
NEUTROPHILS # BLD AUTO: 3.57 X10(3) UL (ref 1.5–7.7)
NEUTROPHILS NFR BLD AUTO: 63.7 %
NONHDLC SERPL-MCNC: 147 MG/DL (ref ?–130)
OSMOLALITY SERPL CALC.SUM OF ELEC: 309 MOSM/KG (ref 275–295)
PLATELET # BLD AUTO: 192 10(3)UL (ref 150–450)
POTASSIUM SERPL-SCNC: 5.4 MMOL/L (ref 3.5–5.1)
PROT SERPL-MCNC: 8 G/DL (ref 5.7–8.2)
RBC # BLD AUTO: 3.69 X10(6)UL
SODIUM SERPL-SCNC: 144 MMOL/L (ref 136–145)
TIBC SERPL-MCNC: 359 UG/DL (ref 250–425)
TRANSFERRIN SERPL-MCNC: 241 MG/DL (ref 215–365)
TRIGL SERPL-MCNC: 75 MG/DL (ref 30–149)
TSI SER-ACNC: 1.02 MIU/ML (ref 0.55–4.78)
VLDLC SERPL CALC-MCNC: 14 MG/DL (ref 0–30)
WBC # BLD AUTO: 5.6 X10(3) UL (ref 4–11)

## 2024-10-25 PROCEDURE — 82728 ASSAY OF FERRITIN: CPT

## 2024-10-25 PROCEDURE — 80061 LIPID PANEL: CPT

## 2024-10-25 PROCEDURE — 36415 COLL VENOUS BLD VENIPUNCTURE: CPT

## 2024-10-25 PROCEDURE — 83540 ASSAY OF IRON: CPT

## 2024-10-25 PROCEDURE — 83036 HEMOGLOBIN GLYCOSYLATED A1C: CPT

## 2024-10-25 PROCEDURE — 85025 COMPLETE CBC W/AUTO DIFF WBC: CPT

## 2024-10-25 PROCEDURE — 84443 ASSAY THYROID STIM HORMONE: CPT

## 2024-10-25 PROCEDURE — 84466 ASSAY OF TRANSFERRIN: CPT

## 2024-10-25 PROCEDURE — 80053 COMPREHEN METABOLIC PANEL: CPT

## 2024-11-01 ENCOUNTER — TELEPHONE (OUTPATIENT)
Dept: INTERNAL MEDICINE CLINIC | Facility: CLINIC | Age: 74
End: 2024-11-01

## 2024-11-01 DIAGNOSIS — E87.5 SERUM POTASSIUM ELEVATED: Primary | ICD-10-CM

## 2024-11-01 RX ORDER — TRIAMCINOLONE ACETONIDE 1 MG/G
CREAM TOPICAL 2 TIMES DAILY PRN
Qty: 60 G | Refills: 3 | Status: SHIPPED | OUTPATIENT
Start: 2024-11-01

## 2024-11-01 NOTE — TELEPHONE ENCOUNTER
Lary called back. She states pharmacy did not receive triamcinolone. Transmission to pharmacy failed. Resent and received confirmation it went through. E-Prescribing Status: Receipt confirmed by pharmacy (11/1/2024  4:11 PM CDT)

## 2024-11-01 NOTE — TELEPHONE ENCOUNTER
Dr. Delaney, what lab test did you want for the patient to re-check next week. Thank you.    Called and spoke to patient's daughter, Lary, verified patient's  and instructed her Dr. Delaney has reviewed her father's lab results as below:    Please call patient daughter - with blood test results.     Patient and his wife does not speak English much      Kidney function is decreased and some worsening  -Patient already have appointment with kidney doctor nephrologist Dr. Sintia Quintero- 12/10/24 - to make sure to keep that appointment and to come for a visit - 15 minutes earlier     Potassium is elevated recommend patient to  -Have another potassium check in few days-recommend to avoid  Potassium rich diet tomatoes potatoes bananas oranges..  Be fasting-before the blood test at least for 6 hours fasting.  New blood test is in the system     Keep with good water hydration 50 to 60 ounces of water per day  Liver function is normal     Sugar is mildly elevated A1c is in prediabetic range   keep with low sugar and carbohydrate diet-        Cholesterol LDL is elevated 133  -goal is less than 100 recommend patient keep with low saturated fat diet less red meat fried food butter cheese-..     Thyroid hormone is in normal range     Recommend to continue present management and have follow-up visit as scheduled on 24.    Lary verbalized understanding and will contact her father.

## 2024-11-05 DIAGNOSIS — D50.0 IRON DEFICIENCY ANEMIA DUE TO CHRONIC BLOOD LOSS: Primary | ICD-10-CM

## 2024-11-05 DIAGNOSIS — K25.0 ACUTE GASTRIC ULCER WITH HEMORRHAGE: ICD-10-CM

## 2024-11-12 ENCOUNTER — OFFICE VISIT (OUTPATIENT)
Dept: INTERNAL MEDICINE CLINIC | Facility: CLINIC | Age: 74
End: 2024-11-12

## 2024-11-12 VITALS
DIASTOLIC BLOOD PRESSURE: 73 MMHG | HEART RATE: 56 BPM | BODY MASS INDEX: 24.38 KG/M2 | HEIGHT: 72 IN | WEIGHT: 180 LBS | SYSTOLIC BLOOD PRESSURE: 154 MMHG

## 2024-11-12 DIAGNOSIS — R73.03 PREDIABETES: ICD-10-CM

## 2024-11-12 DIAGNOSIS — L30.9 DERMATITIS: Primary | ICD-10-CM

## 2024-11-12 DIAGNOSIS — E87.5 SERUM POTASSIUM ELEVATED: ICD-10-CM

## 2024-11-12 DIAGNOSIS — I10 ESSENTIAL HYPERTENSION WITH GOAL BLOOD PRESSURE LESS THAN 130/80: ICD-10-CM

## 2024-11-12 PROCEDURE — 3078F DIAST BP <80 MM HG: CPT | Performed by: INTERNAL MEDICINE

## 2024-11-12 PROCEDURE — 3077F SYST BP >= 140 MM HG: CPT | Performed by: INTERNAL MEDICINE

## 2024-11-12 PROCEDURE — 99215 OFFICE O/P EST HI 40 MIN: CPT | Performed by: INTERNAL MEDICINE

## 2024-11-12 PROCEDURE — 3008F BODY MASS INDEX DOCD: CPT | Performed by: INTERNAL MEDICINE

## 2024-11-12 RX ORDER — TRIAMCINOLONE ACETONIDE 5 MG/G
CREAM TOPICAL
Qty: 15 G | Refills: 1 | Status: SHIPPED | OUTPATIENT
Start: 2024-11-12

## 2024-11-12 RX ORDER — AMLODIPINE BESYLATE 10 MG/1
10 TABLET ORAL DAILY
Qty: 90 TABLET | Refills: 0 | Status: SHIPPED | OUTPATIENT
Start: 2024-11-12

## 2024-11-12 NOTE — PROGRESS NOTES
Subjective:   Patient ID: Ashley Matos is a 74 year old male.  Chief Complaint   Patient presents with    Hypertension    Rash     C/o rash on left leg, arms and body.       Patient is limited with language he speaks Bruneian and his daughter is on the phone interpreting-    Patient states has itchy rash of his hands  and lower legs -that is improving with cream -still present but is better     Patient states his blood pressure at home is improving now usually using 1  range/70-80  , denies concerns or symptoms today denes CP spb palpitations, he is eating well and denies any stomach  pains  heartburns or  any nausea ,vomiting ,diarrhea ,constipation or blood in stool.     Seen Gastoenerologist had colonocopy in 8/24 and EGD -recently and taking  Nexium 40 mg bid     Denies abdominal pain, denies UTI symptoms fever or chills .         Past Medical History:  Diagnosis Date  Difficulty in urination  Enlarged prostate  Essential (primary) hypertension  Gastric ulcer  Gastroesophageal reflux disease  Primary generalized (osteo)arthritis        Current Outpatient Medications  Medication Sig Dispense Refill  tamsulosin (FLOMAX) 0.4 MG Cap Take 0.4 mg by mouth daily.  metoPROLOL tartrate (LOPRESSOR) 25 MG tablet Take 1 tablet by mouth every 12 hours. 60 tablet 3  omeprazole (PriLOSEC) 40 MG capsule Take 1 capsule by mouth in the morning and 1 capsule in the evening. 60 capsule 1  Misc Natural Products (Pumpkin Seed Oil) Cap Take 1 capsule by mouth daily.      ALLERGIES:  No Known Allergies        History/Other:   Review of Systems   Constitutional:  Negative for chills, fatigue and fever.   HENT:  Negative for ear pain and sore throat.    Eyes:  Negative for pain and redness.   Respiratory:  Negative for cough, shortness of breath and wheezing.    Cardiovascular:  Negative for chest pain, palpitations and leg swelling.   Gastrointestinal:  Negative for abdominal pain, anal bleeding, blood in stool, constipation,  diarrhea, nausea, rectal pain and vomiting.        Denies heartburns   Genitourinary:  Negative for dysuria and frequency.   Skin:  Positive for rash (improving). Negative for pallor.   Neurological:  Negative for dizziness and headaches.   Psychiatric/Behavioral:  Negative for confusion and sleep disturbance. The patient is not nervous/anxious.      Current Outpatient Medications   Medication Sig Dispense Refill    triamcinolone 0.1 % External Cream Apply topically 2 (two) times daily as needed. 60 g 3    amLODIPine 5 MG Oral Tab Take 1 tablet (5 mg total) by mouth daily. 90 tablet 0    Esomeprazole Magnesium 40 MG Oral Capsule Delayed Release Take 1 capsule (40 mg total) by mouth 2 (two) times daily before meals. 60 capsule 3    Ferrous Sulfate (IRON) 325 (65 Fe) MG Oral Tab Take 1 tablet by mouth every other day. 90 tablet 3    Misc Natural Products (PUMPKIN SEED OIL OR) Take 1 capsule by mouth daily.      tamsulosin 0.4 MG Oral Cap Take 2 capsules (0.8 mg total) by mouth nightly. TAKE AT BEDTIME      hydrALAZINE 25 MG Oral Tab Take 1 tablet (25 mg total) by mouth 3 (three) times daily. (Patient not taking: Reported on 11/12/2024) 270 tablet 0    metoprolol tartrate 25 MG Oral Tab Take 1 tablet (25 mg total) by mouth Q12H. (Patient not taking: Reported on 11/12/2024) 180 tablet 0     Allergies:No Known Allergies    Objective:   Physical Exam  Vitals and nursing note reviewed.   Constitutional:       General: He is not in acute distress.  HENT:      Head: Normocephalic and atraumatic.   Eyes:      General: No scleral icterus.        Right eye: No discharge.         Left eye: No discharge.   Cardiovascular:      Rate and Rhythm: Normal rate and regular rhythm.      Heart sounds: Normal heart sounds. No murmur heard.  Pulmonary:      Effort: Pulmonary effort is normal. No respiratory distress.      Breath sounds: Normal breath sounds. No wheezing or rales.   Abdominal:      Palpations: Abdomen is soft. There is no  mass.      Tenderness: There is no abdominal tenderness. There is no right CVA tenderness or left CVA tenderness.      Comments: No hepatomegaly, no splenomegaly   Musculoskeletal:      Cervical back: Neck supple.      Right knee: No swelling, erythema, bony tenderness or crepitus. Decreased range of motion.      Right lower leg: No edema.      Left lower leg: No edema.   Lymphadenopathy:      Cervical: No cervical adenopathy.   Skin:     General: Skin is warm and dry.      Findings: Rash present. Rash is purpuric and scaling. Rash is not pustular, urticarial or vesicular.             Comments: Left more than lower leg and hands-rash improving.   Neurological:      Mental Status: He is alert and oriented to person, place, and time.   Psychiatric:         Behavior: Behavior normal.     Blood pressure 154/73, pulse 56, height 6' (1.829 m), weight 180 lb (81.6 kg).        Assessment & Plan:      Dermatitis  Contact dermatitis   Localized  rash hands forearms  and lower legs   Avoid  creams lotions or hand sensitizer     Triamcinolone 0.5% cream bid 1-2  weeks thin layer   Avoid gloves  at work   - Derm Referral - In Network   Patient believes the rash was caused by medication metoprolol and hydralazine.  He stopped  taking medications     Recommend to  see Allergist Dr Emmanuel to jadyn on Metoprolol and hydralazine possible allergies .        Essential hypertension with goal blood   History of atrial flutter  Patient has Holter monitor-showers occasional  PSVT   Stoped metoprolol 25 mg twice daily  and Hydralazine 25 mg   due to possible allergic reaction ?  On  Amlodipine 5 mg every day  Tolerating medication well increase to 10 mg daily  Noticed elevated blood pressure  Side effects and directions of medication discussed with patient wife and his daughter     Patient verbalized understanding and compliance   F/u with  cardiologist soon   - Cardio Referral - again   Internal  F/u in  -3 weeks      Benign prostatic  hyperplasia without lower urinary tract symptoms  Patient is on tamsulosin 0.5 mg 2 capsules at night  Stable symptoms continue present management follow-up with urology  Fluids   Stable cpm      Gastroesophageal reflux disease without esophagitis  Patient advised to avoid spicy food, coffee, tea, caffeinated drinks, alcohol, acidic food/juices  Advised to avoid NSAID's - Aspirin-based medications  Advised to avoid wearing  tight clothes   Advised to elevate the head of the bed   Avoid eating at least 3 hours before bedtime   Counseling on ideal weight/BMI  Patient not -Taking  Esomeprazole 40 mg twice daily 30-60  minutes before breakfast and dinner - monitor kidney function might need to change to Famotidine /Pepcid  Due to decreased kidney function recommend to start taking esomeprazole 40 mg once daily in the morning - monitor    Side effects and directions of medication discussed with patient. Patient verbalized understanding and compliance.    Stable cpm on diet   Labs  monitor  His gastric biopsies were benign with no findings of h pylori and no concerning cells for cancer.   - Gastro f/u visit    Has appointment in March /25 with gastroenterology      Prediabetes   Decrease sugar and carbohydrate diet   Labs to follow   A1c 6.3 ->-5/9  -8/24  5.8 - 10/24   Stable cpm         Stage 3 chronic kidney disease, unspecified whether stage 3a or 3b CKD (HCC)  Labs to complete today  Last GFR was in 50s  - 33  Keep with good water hydration and avoid NSAIDs  Refer to Nephrologist  Dr Jones -patient has appointment on December 10, 2024  [facility] labs   - Comp Metabolic Panel (14); Future  - Nephrology Referral - In Network     Anemia   likely due to bleeding -gastric  Last hemoglobin on 12/31   -8.0--  5/24 -9.2 --8.9   9.0 - 8/23/24     9.4 -10/24 - improving slowly   Labs today to complete  Hematology -Dr Barrett   Referred   If any persistent worsening symptoms patient to go to emergency room    -  Oncology/Hematology Referral - [place] (Dinorah Kahn)     Potassium elevated recommend low potassium diet and recheck potassium-  Fasting blood test to be checked in AM       Recommend patient his wife and specialist daughter close translating-for patient to see the specialist as advised complete the blood testing and follow-up on his appointments regularly          Total time spent caring for the patient on the day of the encounter:  40 min  This includes pre-charting, reviewing and obtaining results, exam, plan, notes, and counseling.      No orders of the defined types were placed in this encounter.      Meds This Visit:  Requested Prescriptions      No prescriptions requested or ordered in this encounter       Imaging & Referrals:  None

## 2024-11-23 ENCOUNTER — LAB ENCOUNTER (OUTPATIENT)
Dept: LAB | Age: 74
End: 2024-11-23
Attending: INTERNAL MEDICINE
Payer: COMMERCIAL

## 2024-11-23 DIAGNOSIS — E87.5 SERUM POTASSIUM ELEVATED: ICD-10-CM

## 2024-11-23 DIAGNOSIS — I10 ESSENTIAL HYPERTENSION WITH GOAL BLOOD PRESSURE LESS THAN 130/80: ICD-10-CM

## 2024-11-23 LAB
ALBUMIN SERPL-MCNC: 4.4 G/DL (ref 3.2–4.8)
ALBUMIN/GLOB SERPL: 1.4 {RATIO} (ref 1–2)
ALP LIVER SERPL-CCNC: 130 U/L
ALT SERPL-CCNC: 16 U/L
ANION GAP SERPL CALC-SCNC: 6 MMOL/L (ref 0–18)
AST SERPL-CCNC: 15 U/L (ref ?–34)
BILIRUB SERPL-MCNC: 0.3 MG/DL (ref 0.2–1.1)
BUN BLD-MCNC: 36 MG/DL (ref 9–23)
BUN/CREAT SERPL: 17.3 (ref 10–20)
CALCIUM BLD-MCNC: 9.4 MG/DL (ref 8.7–10.4)
CHLORIDE SERPL-SCNC: 113 MMOL/L (ref 98–112)
CO2 SERPL-SCNC: 24 MMOL/L (ref 21–32)
CREAT BLD-MCNC: 2.08 MG/DL
EGFRCR SERPLBLD CKD-EPI 2021: 33 ML/MIN/1.73M2 (ref 60–?)
FASTING STATUS PATIENT QL REPORTED: YES
GLOBULIN PLAS-MCNC: 3.2 G/DL (ref 2–3.5)
GLUCOSE BLD-MCNC: 97 MG/DL (ref 70–99)
OSMOLALITY SERPL CALC.SUM OF ELEC: 304 MOSM/KG (ref 275–295)
POTASSIUM SERPL-SCNC: 5.4 MMOL/L (ref 3.5–5.1)
PROT SERPL-MCNC: 7.6 G/DL (ref 5.7–8.2)
SODIUM SERPL-SCNC: 143 MMOL/L (ref 136–145)

## 2024-11-23 PROCEDURE — 36415 COLL VENOUS BLD VENIPUNCTURE: CPT

## 2024-11-23 PROCEDURE — 80053 COMPREHEN METABOLIC PANEL: CPT

## 2024-12-10 ENCOUNTER — OFFICE VISIT (OUTPATIENT)
Facility: CLINIC | Age: 74
End: 2024-12-10

## 2024-12-10 VITALS
WEIGHT: 181 LBS | BODY MASS INDEX: 25 KG/M2 | SYSTOLIC BLOOD PRESSURE: 135 MMHG | DIASTOLIC BLOOD PRESSURE: 70 MMHG | HEART RATE: 60 BPM

## 2024-12-10 DIAGNOSIS — N18.30 STAGE 3 CHRONIC KIDNEY DISEASE, UNSPECIFIED WHETHER STAGE 3A OR 3B CKD (HCC): Primary | ICD-10-CM

## 2024-12-10 DIAGNOSIS — N40.1 BENIGN PROSTATIC HYPERPLASIA WITH NOCTURIA: ICD-10-CM

## 2024-12-10 DIAGNOSIS — R35.1 BENIGN PROSTATIC HYPERPLASIA WITH NOCTURIA: ICD-10-CM

## 2024-12-10 PROCEDURE — 99204 OFFICE O/P NEW MOD 45 MIN: CPT | Performed by: INTERNAL MEDICINE

## 2024-12-10 PROCEDURE — 3075F SYST BP GE 130 - 139MM HG: CPT | Performed by: INTERNAL MEDICINE

## 2024-12-10 PROCEDURE — 3078F DIAST BP <80 MM HG: CPT | Performed by: INTERNAL MEDICINE

## 2024-12-10 NOTE — PROGRESS NOTES
Consult Requested By: Dr. Delaney    Reason for Consult: SETH on CKD 3    HPI:       73 y/o M ( Italian) h/o HTN, atrial flutter, GERD with gastric ulcers sees gi on ppi for it- since dec 2023  , BPH, anemia, arthritis and elevated cr is here for initial evaluation   Bp 135-140 mmhg at home  Also when he was hospitalized found to have obstructive uropathy, b/l hydro and following with Dr Up  B/l cr 1.5 mg/dl ( likely changes sec to chronic obstruction)   Recently cr getting worse 1.5--> 2.0 mg/dl  He was on metoprolol and hydralazine ( stopped bc of allergic reaction) also omeprazole switched to esomeprazole  Echo in dec 2023- normal systolic fx,     Accompanied by his nephew who can speak english    HISTORY:  Past Medical History:    Essential hypertension    High blood pressure    History of stomach ulcers      Past Surgical History:   Procedure Laterality Date    Egd  08/29/2024    Dr. Payne; gastric ulcer      History reviewed. No pertinent family history.   Social History:   Social History     Socioeconomic History    Marital status:    Tobacco Use    Smoking status: Former     Types: Cigarettes     Passive exposure: Never    Smokeless tobacco: Never   Vaping Use    Vaping status: Never Used   Substance and Sexual Activity    Alcohol use: Never    Drug use: Never     Social Drivers of Health     Financial Resource Strain: Low Risk  (12/29/2023)    Received from [employer], [facility]    Financial Resource Strain     In the past year, have you or any family members you live with been unable to get any of the following when it was really needed? Check all that apply.: None   Food Insecurity: Medium Risk (12/29/2023)    Received from [employer], [facility]    Food Insecurity     Within the past 12 months, you worried that your food would run out before you got money to buy more.  : Sometimes true     Within the past 12 months, the food you  bought just didn't last and you didn't have money to get more. : Sometimes true   Transportation Needs: Not At Risk (12/29/2023)    Received from [employer], [facility]    Transportation Needs     In the past 12 months, has lack of reliable transportation kept you from medical appointments, meetings, work or from getting things needed for daily living? : No   Physical Activity: High Risk (1/24/2024)    Received from [facility], [facility]    Exercise Vital Sign     On average, how many days per week do you engage in moderate to strenuous exercise (like a brisk walk)?: 0 days     On average, how many minutes do you engage in exercise at this level?: 0 min   Social Connections: Medium Risk (12/29/2023)    Received from [facility], [facility]    Social Connections     How often do you see or talk to people that you care about and feel close to? (For example: talking to friends on the phone, visiting friends or family, going to Jewish or club meetings): 1 or 2 times a week        Medications (Active prior to today's visit):  Current Outpatient Medications   Medication Sig Dispense Refill    amLODIPine 10 MG Oral Tab Take 1 tablet (10 mg total) by mouth daily. 90 tablet 0    Esomeprazole Magnesium 40 MG Oral Capsule Delayed Release Take 1 capsule (40 mg total) by mouth 2 (two) times daily before meals. 60 capsule 3    Ferrous Sulfate (IRON) 325 (65 Fe) MG Oral Tab Take 1 tablet by mouth every other day. 90 tablet 3    metoprolol tartrate 25 MG Oral Tab Take 1 tablet (25 mg total) by mouth Q12H. 180 tablet 0    tamsulosin 0.4 MG Oral Cap Take 2 capsules (0.8 mg total) by mouth nightly. TAKE AT BEDTIME      triamcinolone 0.5 % External Cream Apply thin layer of the cream twice per day on affected area of the skin 15 g 1    hydrALAZINE 25 MG Oral Tab Take 1 tablet (25 mg total) by mouth 3 (three) times daily. (Patient not taking: Reported on 10/22/2024)  270 tablet 0    Misc Natural Products (PUMPKIN SEED OIL OR) Take 1 capsule by mouth daily.       Not taking hydralazine and metoprolol    Allergies:  Allergies[1]      ROS:     Constitutional:  Negative for decreased activity, fever, irritability and lethargy  ENMT:  Negative for ear drainage, hearing loss and nasal drainage  Eyes:  Negative for eye discharge and vision loss  Cardiovascular:  Negative for chest pain, sobs  Respiratory:  Negative for cough, dyspnea and wheezing  Gastrointestinal:  Negative for abdominal pain, constipation  Genitourinary:  Negative for dysuria and hematuria  Endocrine:  Negative for abnormal sleep patterns, increased activity  Hema/Lymph:  Negative for easy bleeding and easy bruising  Integumentary:  Negative for pruritus and rash  Musculoskeletal:  Negative for bone/joint symptoms  Neurological:  Negative for gait disturbance  Psychiatric:  Negative for inappropriate interaction and psychiatric symptoms      Vitals:    12/10/24 1509   BP: 135/70   Pulse:        PHYSICAL EXAM:   Constitutional: appears well hydrated alert and responsive no acute distress noted  Head/Face: normocephalic  Eyes/Vision: normal extraocular motion is intact  Nose/Mouth/Throat:mucous membranes are moist   Neck/Thyroid: neck is supple without adenopathy  Lymphatic: no abnormal cervical, supraclavicular adenopathy is noted  Respiratory:  lungs are clear to auscultation bilaterally  Cardiovascular: regular rate and rhythm  Abdomen: soft, non-tender, non-distended, BS normal  Skin/Hair: no unusual rashes present  Back/Spine: no abnormalities noted  Musculoskeletal:  no deformities  Extremities: no edema  Neurological:  Grossly normal    Lab Results   Component Value Date     11/23/2024     10/25/2024     08/23/2024    K 5.4 (H) 11/23/2024    K 5.4 (H) 10/25/2024    K 4.8 08/23/2024     (H) 11/23/2024     (H) 10/25/2024     08/23/2024    CO2 24.0 11/23/2024    CO2 26.0  10/25/2024    CO2 24.0 08/23/2024    BUN 36 (H) 11/23/2024    BUN 45 (H) 10/25/2024    BUN 41 (H) 08/23/2024    CREATSERUM 2.08 (H) 11/23/2024    CREATSERUM 2.49 (H) 10/25/2024    CREATSERUM 2.10 (H) 08/23/2024    CA 9.4 11/23/2024    CA 9.4 10/25/2024    CA 9.1 08/23/2024    EGFRCR 33 (L) 11/23/2024    EGFRCR 27 (L) 10/25/2024    EGFRCR 33 (L) 08/23/2024    ALB 4.4 11/23/2024    ALB 4.5 10/25/2024    ALB 4.3 08/23/2024      ASSESSMENT/PLAN:   Assessment   1. Stage 3 chronic kidney disease, unspecified whether stage 3a or 3b CKD ([facility])  - Renal Function Panel; Future  - Urinalysis, Routine; Future  - PTH, Intact; Future  - Sodium, Urine, Random; Future  - Eosinophil smear; Future  - US KIDNEY/BLADDER (CPT=76770); Future    2. Benign prostatic hyperplasia with nocturia  - US KIDNEY/BLADDER (CPT=76770); Future       SETH    Recent rise in cr in past few mths - concern for retention ?   Maryana since mild hyperkalemia  Recheck renal / bladder US  Check UA for protein/blood  Not on any offending agents  Hydrate and repeat labs  Also will reach out to Dr Up   Also mild hyperkalemia- low K diet discussed     CKD 3  B/l cr 1.5 mg/dl likely sec to chronic obstruction  Previous renal us at [facility]- inc echogenicity, cortical thinning. Mod to severe b/l hydronephrosis and hydroureter    HTN   On amlodipine  Bp at home 130/ 70 mmhg  Low salt diet discussed    BPH  On tamsulosin  Will fu with urology   Renal/bladder us    Gerd/gastric ulcer   On ppi    PLAN  No nsaids  Low potassium diet  Hydrate well  Renal US   Urine sodium and eosinophils  Labs before next appt  Urology fu     Orders This Visit:  Orders Placed This Encounter   Procedures    Renal Function Panel    Urinalysis, Routine    PTH, Intact    Sodium, Urine, Random    Eosinophil smear       Meds This Visit:  Requested Prescriptions      No prescriptions requested or ordered in this encounter       Imaging & Referrals:  US KIDNEY/BLADDER (CPT=76770)     Azul  MD Robert  12/10/2024            [1] No Known Allergies

## 2024-12-16 ENCOUNTER — OFFICE VISIT (OUTPATIENT)
Dept: DERMATOLOGY CLINIC | Facility: CLINIC | Age: 74
End: 2024-12-16

## 2024-12-16 DIAGNOSIS — L30.9 ECZEMA, UNSPECIFIED TYPE: Primary | ICD-10-CM

## 2024-12-16 PROCEDURE — 99204 OFFICE O/P NEW MOD 45 MIN: CPT | Performed by: STUDENT IN AN ORGANIZED HEALTH CARE EDUCATION/TRAINING PROGRAM

## 2024-12-16 RX ORDER — PANTOPRAZOLE SODIUM 40 MG/1
40 TABLET, DELAYED RELEASE ORAL 2 TIMES DAILY
COMMUNITY
Start: 2024-12-05

## 2024-12-16 RX ORDER — CLOBETASOL PROPIONATE 0.5 MG/G
OINTMENT TOPICAL
Qty: 60 G | Refills: 3 | Status: SHIPPED | OUTPATIENT
Start: 2024-12-16

## 2024-12-16 NOTE — PROGRESS NOTES
December 16, 2024    New patient     Referred by:   Marcie Delaney MD  [address] [place] [address]      CHIEF COMPLAINT: Rash     HISTORY OF PRESENT ILLNESS: Ashley Matos is a 74 year old male here for evaluation of rash.    Location: bilateral lower legs and hands  Duration: 3 months   Signs and symptoms: itchy and redness (arms have improved, legs remain)  Current treatment:  Triamcinolone    Past treatments: none    Personal Dermatologic History  History of chronic skin disease: No    Family History  History of chronic skin disease: No  History autoimmune diseases:  No    Past Medical History  Past Medical History:    Essential hypertension    High blood pressure    History of stomach ulcers       REVIEW OF SYSTEMS:  Constitutional: Denies fever, chills, unintentional weight loss.   Skin as per HPI    Medications  Current Outpatient Medications   Medication Sig Dispense Refill    pantoprazole 40 MG Oral Tab EC Take 1 tablet (40 mg total) by mouth 2 (two) times daily.      amLODIPine 10 MG Oral Tab Take 1 tablet (10 mg total) by mouth daily. 90 tablet 0    triamcinolone 0.5 % External Cream Apply thin layer of the cream twice per day on affected area of the skin 15 g 1    Esomeprazole Magnesium 40 MG Oral Capsule Delayed Release Take 1 capsule (40 mg total) by mouth 2 (two) times daily before meals. 60 capsule 3    Ferrous Sulfate (IRON) 325 (65 Fe) MG Oral Tab Take 1 tablet by mouth every other day. 90 tablet 3    metoprolol tartrate 25 MG Oral Tab Take 1 tablet (25 mg total) by mouth Q12H. 180 tablet 0    Misc Natural Products (PUMPKIN SEED OIL OR) Take 1 capsule by mouth daily.      tamsulosin 0.4 MG Oral Cap Take 2 capsules (0.8 mg total) by mouth nightly. TAKE AT BEDTIME      hydrALAZINE 25 MG Oral Tab Take 1 tablet (25 mg total) by mouth 3 (three) times daily. (Patient not taking: Reported on 12/16/2024) 270 tablet 0       PHYSICAL EXAM:  General: awake, alert, no acute distress  Skin: Skin  exam was performed today including the following: legs and arms. Pertinent findings include:   - with pink scaly well defined plaques    ASSESSMENT & PLAN:  Pathophysiology of diagnoses discussed with patient.  Therapeutic options reviewed. Risks, benefits, and alternatives discussed with patient. Instructions reviewed at length.    #Eczematous Dermatitis  - clobetasol 0.05% twice daily to affected areas Monday-Friday. Take weekends off. Avoid use on face, breasts, groin, or axillae.      Return to clinic:  6 weeks  or sooner if something concerning arises    Javier Colon MD

## 2025-01-21 ENCOUNTER — LAB ENCOUNTER (OUTPATIENT)
Dept: LAB | Age: 75
End: 2025-01-21
Attending: INTERNAL MEDICINE
Payer: COMMERCIAL

## 2025-01-21 DIAGNOSIS — K25.0 ACUTE GASTRIC ULCER WITH HEMORRHAGE: ICD-10-CM

## 2025-01-21 DIAGNOSIS — D50.0 IRON DEFICIENCY ANEMIA DUE TO CHRONIC BLOOD LOSS: ICD-10-CM

## 2025-01-21 DIAGNOSIS — N18.30 STAGE 3 CHRONIC KIDNEY DISEASE, UNSPECIFIED WHETHER STAGE 3A OR 3B CKD (HCC): ICD-10-CM

## 2025-01-21 LAB
ALBUMIN SERPL-MCNC: 4.3 G/DL (ref 3.2–4.8)
ANION GAP SERPL CALC-SCNC: 8 MMOL/L (ref 0–18)
BASOPHILS # BLD AUTO: 0.04 X10(3) UL (ref 0–0.2)
BASOPHILS NFR BLD AUTO: 0.6 %
BILIRUB UR QL: NEGATIVE
BUN BLD-MCNC: 34 MG/DL (ref 9–23)
BUN/CREAT SERPL: 15.8 (ref 10–20)
CALCIUM BLD-MCNC: 9.1 MG/DL (ref 8.7–10.4)
CHLORIDE SERPL-SCNC: 117 MMOL/L (ref 98–112)
CLARITY UR: CLEAR
CO2 SERPL-SCNC: 20 MMOL/L (ref 21–32)
COLOR UR: COLORLESS
CREAT BLD-MCNC: 2.15 MG/DL
DEPRECATED HBV CORE AB SER IA-ACNC: 65 NG/ML
DEPRECATED RDW RBC AUTO: 44.6 FL (ref 35.1–46.3)
EGFRCR SERPLBLD CKD-EPI 2021: 32 ML/MIN/1.73M2 (ref 60–?)
EOSINOPHIL # BLD AUTO: 0.32 X10(3) UL (ref 0–0.7)
EOSINOPHIL NFR BLD AUTO: 4.9 %
ERYTHROCYTE [DISTWIDTH] IN BLOOD BY AUTOMATED COUNT: 14.8 % (ref 11–15)
GLUCOSE BLD-MCNC: 94 MG/DL (ref 70–99)
GLUCOSE UR-MCNC: NORMAL MG/DL
HCT VFR BLD AUTO: 29 %
HGB BLD-MCNC: 9.2 G/DL
HGB UR QL STRIP.AUTO: NEGATIVE
IMM GRANULOCYTES # BLD AUTO: 0.03 X10(3) UL (ref 0–1)
IMM GRANULOCYTES NFR BLD: 0.5 %
IRON SATN MFR SERPL: 10 %
IRON SERPL-MCNC: 35 UG/DL
KETONES UR-MCNC: NEGATIVE MG/DL
LEUKOCYTE ESTERASE UR QL STRIP.AUTO: NEGATIVE
LYMPHOCYTES # BLD AUTO: 1.18 X10(3) UL (ref 1–4)
LYMPHOCYTES NFR BLD AUTO: 18.1 %
MCH RBC QN AUTO: 26.4 PG (ref 26–34)
MCHC RBC AUTO-ENTMCNC: 31.7 G/DL (ref 31–37)
MCV RBC AUTO: 83.3 FL
MONOCYTES # BLD AUTO: 0.35 X10(3) UL (ref 0.1–1)
MONOCYTES NFR BLD AUTO: 5.4 %
NEUTROPHILS # BLD AUTO: 4.59 X10 (3) UL (ref 1.5–7.7)
NEUTROPHILS # BLD AUTO: 4.59 X10(3) UL (ref 1.5–7.7)
NEUTROPHILS NFR BLD AUTO: 70.5 %
NITRITE UR QL STRIP.AUTO: NEGATIVE
OSMOLALITY SERPL CALC.SUM OF ELEC: 307 MOSM/KG (ref 275–295)
PH UR: 5 [PH] (ref 5–8)
PHOSPHATE SERPL-MCNC: 3.3 MG/DL (ref 2.4–5.1)
PLATELET # BLD AUTO: 268 10(3)UL (ref 150–450)
PLATELETS.RETICULATED NFR BLD AUTO: 1.3 % (ref 0–7)
POTASSIUM SERPL-SCNC: 5 MMOL/L (ref 3.5–5.1)
PROT UR-MCNC: NEGATIVE MG/DL
PTH-INTACT SERPL-MCNC: 114.8 PG/ML (ref 18.5–88)
RBC # BLD AUTO: 3.48 X10(6)UL
SODIUM SERPL-SCNC: 145 MMOL/L (ref 136–145)
SODIUM SERPL-SCNC: 86 MMOL/L
SP GR UR STRIP: 1.01 (ref 1–1.03)
TIBC SERPL-MCNC: 334 UG/DL (ref 250–425)
TRANSFERRIN SERPL-MCNC: 224 MG/DL (ref 215–365)
UROBILINOGEN UR STRIP-ACNC: NORMAL
WBC # BLD AUTO: 6.5 X10(3) UL (ref 4–11)

## 2025-01-21 PROCEDURE — 81003 URINALYSIS AUTO W/O SCOPE: CPT

## 2025-01-21 PROCEDURE — 83970 ASSAY OF PARATHORMONE: CPT

## 2025-01-21 PROCEDURE — 82728 ASSAY OF FERRITIN: CPT

## 2025-01-21 PROCEDURE — 80069 RENAL FUNCTION PANEL: CPT

## 2025-01-21 PROCEDURE — 84300 ASSAY OF URINE SODIUM: CPT

## 2025-01-21 PROCEDURE — 83540 ASSAY OF IRON: CPT

## 2025-01-21 PROCEDURE — 84466 ASSAY OF TRANSFERRIN: CPT

## 2025-01-21 PROCEDURE — 85025 COMPLETE CBC W/AUTO DIFF WBC: CPT

## 2025-01-21 PROCEDURE — 36415 COLL VENOUS BLD VENIPUNCTURE: CPT

## 2025-01-24 ENCOUNTER — TELEPHONE (OUTPATIENT)
Dept: INTERNAL MEDICINE CLINIC | Facility: CLINIC | Age: 75
End: 2025-01-24

## 2025-01-24 NOTE — TELEPHONE ENCOUNTER
Shereen calling, she is asking if patient was advised NOT to follow up with Dr. Olvera [facility]rology for elevated PSA  Advised that he was recommended in October by PCP and also by Nephrology provider 12/10/24 that he should follow up with urology     Lis will call the patient and inform and help him schedule follow up visit.   Advised if questions, patient can call us.

## 2025-01-27 ENCOUNTER — OFFICE VISIT (OUTPATIENT)
Dept: DERMATOLOGY CLINIC | Facility: CLINIC | Age: 75
End: 2025-01-27

## 2025-01-27 DIAGNOSIS — L30.9 ECZEMA, UNSPECIFIED TYPE: Primary | ICD-10-CM

## 2025-01-27 PROCEDURE — 99213 OFFICE O/P EST LOW 20 MIN: CPT | Performed by: STUDENT IN AN ORGANIZED HEALTH CARE EDUCATION/TRAINING PROGRAM

## 2025-01-27 RX ORDER — CLOBETASOL PROPIONATE 0.5 MG/G
1 CREAM TOPICAL 2 TIMES DAILY
Qty: 60 G | Refills: 3 | Status: SHIPPED | OUTPATIENT
Start: 2025-01-27 | End: 2026-01-27

## 2025-01-27 RX ORDER — TRIAMCINOLONE ACETONIDE 1 MG/G
1 CREAM TOPICAL AS NEEDED
COMMUNITY
Start: 2024-12-05

## 2025-01-27 NOTE — PROGRESS NOTES
January 27, 2025    New patient     Referred by:   Marcie Delaney MD  [address] [place] [address]      CHIEF COMPLAINT: Eczema f/u    HISTORY OF PRESENT ILLNESS: Ashley Matos is a 74 year old male here eczema f/u     Location: bilateral lower legs and hands  Duration: 4 months   Signs and symptoms: has seen improvement.  Itchiness at times.     Current treatment:  clobetasol   Past treatments:  Triamcinolone      Personal Dermatologic History  History of chronic skin disease: No    Family History  History of chronic skin disease: No  History autoimmune diseases:  No    Past Medical History  Past Medical History:    Essential hypertension    High blood pressure    History of stomach ulcers       REVIEW OF SYSTEMS:  Constitutional: Denies fever, chills, unintentional weight loss.   Skin as per HPI    Medications  Current Outpatient Medications   Medication Sig Dispense Refill    triamcinolone 0.1 % External Cream Apply 1 Application topically as needed.      pantoprazole 40 MG Oral Tab EC Take 1 tablet (40 mg total) by mouth 2 (two) times daily.      clobetasol 0.05 % External Ointment Apply to the affected areas twice daily Monday-Friday. Take weekends off. Advised to AVOID on face, under breasts, underarms and groin. 60 g 3    amLODIPine 10 MG Oral Tab Take 1 tablet (10 mg total) by mouth daily. 90 tablet 0    triamcinolone 0.5 % External Cream Apply thin layer of the cream twice per day on affected area of the skin 15 g 1    hydrALAZINE 25 MG Oral Tab Take 1 tablet (25 mg total) by mouth 3 (three) times daily. (Patient not taking: Reported on 10/22/2024) 270 tablet 0    Esomeprazole Magnesium 40 MG Oral Capsule Delayed Release Take 1 capsule (40 mg total) by mouth 2 (two) times daily before meals. 60 capsule 3    Ferrous Sulfate (IRON) 325 (65 Fe) MG Oral Tab Take 1 tablet by mouth every other day. 90 tablet 3    metoprolol tartrate 25 MG Oral Tab Take 1 tablet (25 mg total) by mouth Q12H. 180 tablet  0    Misc Natural Products (PUMPKIN SEED OIL OR) Take 1 capsule by mouth daily.      tamsulosin 0.4 MG Oral Cap Take 2 capsules (0.8 mg total) by mouth nightly. TAKE AT BEDTIME         PHYSICAL EXAM:  General: awake, alert, no acute distress  Skin: Skin exam was performed today including the following: Bilateral legs . Pertinent findings include:   - L shin pink patch     ASSESSMENT & PLAN:  Pathophysiology of diagnoses discussed with patient.  Therapeutic options reviewed. Risks, benefits, and alternatives discussed with patient. Instructions reviewed at length.    #Eczematous Dermatitis  - Continue clobetasol 0.05% Twice daily as needed to affected areas Monday-Friday. Take weekends off. Avoid use on face, breasts, groin, or axillae. Will switch to cream from ointment     Return to clinic:  6 weeks  or sooner if something concerning arises    By signing my name below, I, Manny VALADEZ MA,  attest that this documentation has been prepared under the direction and in the presence of Javier Colon MD.   Electronically Signed: Manny VALADEZ MA, 1/27/2025, 3:59 PM.    I, Javier Colon MD,  personally performed the services described in this documentation. All medical record entries made by the scribe were at my direction and in my presence.  I have reviewed the chart and agree that the record reflects my personal performance and is accurate and complete.  Javier Colon MD, 1/27/2025, 4:08 PM

## 2025-01-31 ENCOUNTER — TELEPHONE (OUTPATIENT)
Facility: CLINIC | Age: 75
End: 2025-01-31

## 2025-01-31 NOTE — TELEPHONE ENCOUNTER
----- Message from Racquel Payne sent at 1/31/2025 12:39 PM CST -----  Delphine: I've tried calling him 3 times with the Cam  but haven't reached him and he hasn't called back from the VM I've left. I'd like to add colon to his EGD but he previously refused. Let me know if he reaches out. Thanks.

## 2025-02-10 NOTE — TELEPHONE ENCOUNTER
Patient daughter is returning call to schedule colonoscopy with the Esophagogastroduodenoscopy.  Patient is currently scheduled for an Esophagogastroduodenoscopy on 3/25/25.   Please call

## 2025-02-11 RX ORDER — PANTOPRAZOLE SODIUM 40 MG/1
40 TABLET, DELAYED RELEASE ORAL 2 TIMES DAILY
Qty: 90 TABLET | Refills: 0 | OUTPATIENT
Start: 2025-02-11

## 2025-02-11 NOTE — TELEPHONE ENCOUNTER
He was previously getting esomeprazole. Is he taking pantoprazole or esomeprazole? If it's pantoprazole, please resend me the refill request.

## 2025-02-11 NOTE — TELEPHONE ENCOUNTER
Requested Prescriptions     Pending Prescriptions Disp Refills    PANTOPRAZOLE 40 MG Oral Tab EC [Pharmacy Med Name: Pantoprazole Sodium 40 MG Oral Tablet Delayed Release] 90 tablet 0     Sig: Take 1 tablet by mouth twice daily     Last seen: 8/20/24  Suggested follow up:  Next appointment: 3/25/25  Last refill: 12/5/24    Refill pended, please review/sign if agreeable.

## 2025-02-13 RX ORDER — AMLODIPINE BESYLATE 10 MG/1
10 TABLET ORAL DAILY
Qty: 90 TABLET | Refills: 0 | Status: SHIPPED | OUTPATIENT
Start: 2025-02-13

## 2025-02-13 NOTE — TELEPHONE ENCOUNTER
Please kindly review, medication fails/has no protocol attached. - patient's Norvasc dosage was increased to 10 mg at last office visit 11/12/24.    [x] FAILS    [] NO PROTOCOL ATTACHED    Per office visit with Dr. Delaney 11/12/24:  On  Amlodipine 5 mg every day  Tolerating medication well increase to 10 mg daily    Patient of Dr. Delaney.    Future Appointments: None     Recent Visits  Date Type Provider Dept   11/12/24 Office Visit Marcie Delaney MD [facility]   10/22/24 Office Visit Marcie Delaney MD [facility]   08/13/24 Office Visit Marcie Delaney MD [facility]     Requested Prescriptions   Pending Prescriptions Disp Refills    AMLODIPINE 10 MG Oral Tab [Pharmacy Med Name: amLODIPine Besylate 10 MG Oral Tablet] 90 tablet 0     Sig: Take 1 tablet by mouth once daily       Hypertension Medications Protocol Failed - 2/13/2025  3:10 PM        Failed - EGFRCR or GFRNAA > 50     GFR Evaluation  EGFRCR: 32 , resulted on 1/21/2025          Passed - CMP or BMP in past 12 months        Passed - Last BP reading less than 140/90     BP Readings from Last 1 Encounters:   12/10/24 135/70               Passed - In person appointment or virtual visit in the past 12 mos or appointment in next 3 mos     Recent Outpatient Visits              2 weeks ago Eczema, unspecified type    [facility] [place] Javier Colon MD    Office Visit    1 month ago Eczema, unspecified type    [facility] [place] Michalik, Daniel, MD    Office Visit    2 months ago Stage 3 chronic kidney disease, unspecified whether stage 3a or 3b CKD (HCC)    [facility], [facility], Azul Barton MD    Office Visit    3 months ago Dermatitis    [facility] [place]Marcie Pratt MD    Office Visit    3 months ago Acute gastric ulcer with hemorrhage    [facility]  [facility], [facility] Racquel Payne MD    Office Visit          Future Appointments         Provider Department Appt Notes    In 5 days [facility] US [facility] - [place]     In 1 month GIA, PROCEDURE [facility], [facility] Colon/EGD MAC @ [facility]                    Passed - Medication is active on med list

## 2025-02-18 ENCOUNTER — HOSPITAL ENCOUNTER (OUTPATIENT)
Dept: ULTRASOUND IMAGING | Age: 75
Discharge: HOME | End: 2025-02-18
Attending: INTERNAL MEDICINE
Payer: COMMERCIAL

## 2025-02-18 DIAGNOSIS — N18.30 STAGE 3 CHRONIC KIDNEY DISEASE, UNSPECIFIED WHETHER STAGE 3A OR 3B CKD (HCC): ICD-10-CM

## 2025-02-18 DIAGNOSIS — N40.1 BENIGN PROSTATIC HYPERPLASIA WITH NOCTURIA: ICD-10-CM

## 2025-02-18 DIAGNOSIS — R35.1 BENIGN PROSTATIC HYPERPLASIA WITH NOCTURIA: ICD-10-CM

## 2025-02-18 PROCEDURE — 76770 US EXAM ABDO BACK WALL COMP: CPT | Performed by: INTERNAL MEDICINE

## 2025-02-19 ENCOUNTER — TELEPHONE (OUTPATIENT)
Facility: CLINIC | Age: 75
End: 2025-02-19

## 2025-02-19 NOTE — TELEPHONE ENCOUNTER
Called and spoke to daughter Lary  Explained severe hydronephrosis and liver lesions  Recommended er however will discuss with pcp first since pt speaks only Bangladeshi and [place] so v diff for pt to be in hosp since doesn't understand. So prefers outpateint at this time  Dw Dr Up and will see him in clinic this week for hydro  Also sent msg to pcp and will discuss further with her.

## 2025-02-24 ENCOUNTER — TELEPHONE (OUTPATIENT)
Dept: INTERNAL MEDICINE CLINIC | Facility: CLINIC | Age: 75
End: 2025-02-24

## 2025-02-24 DIAGNOSIS — K76.9 LIVER LESION: Primary | ICD-10-CM

## 2025-02-24 NOTE — TELEPHONE ENCOUNTER
Racquel Payne MD Sas, Kathryn E., APRN; Azul Jones MD; Marcie Delaney MD; ABDELRAHMAN Wilkinson Rn Triage  Thanks for the update - I've been pushing for a colonoscopy as well given his anemia, but he just finally agreed to one recently. I am doing EGD/colonoscopy on 3/25. Not sure I can do any sooner but I will see what I can do. Please let me know when the CT is done. Thanks.

## 2025-02-24 NOTE — TELEPHONE ENCOUNTER
Spoke with daughter and rescheduled procedure    Called patient with  and patient stated he does not want to move up the procedure and 3/25 works best for him

## 2025-02-24 NOTE — TELEPHONE ENCOUNTER
Called patient's daughter (name and , ARIE verified)instructed on providers message below. Instructed to schedule the CT scan appt then to call us to schedule the follow up appt to review results. Verbalized understanding and agrees to plan.

## 2025-02-24 NOTE — TELEPHONE ENCOUNTER
Abnormal US results.     It was brought to my attention to review staff message to Dr Delaney from Dr Barry Jones. Abnormal liver lesions seen. He needs CT abdomen pelvis chest. I have placed the order.   Pt needs to be seen in clinic ASAP by an available provider to review these results with him, He will need an appt with GI as well. He is scheduled for EGD/colonoscopy with Dr Payne 3/25/2025. I have sent a staff message to her as well to make aware of the abnormal results.

## 2025-02-24 NOTE — TELEPHONE ENCOUNTER
Noted. Please make sure he has an appt scheduled with an available provider to discuss his abnormal results as well

## 2025-02-24 NOTE — TELEPHONE ENCOUNTER
Racquel Payne MD Sas, Kathryn E., APRN; Azul Jones MD; Marcie Delaney MD; P Em Rn Triage  Rescheduled for 3/13. Thanks.

## 2025-03-24 PROBLEM — K56.609 SBO (SMALL BOWEL OBSTRUCTION) (HCC): Status: ACTIVE | Noted: 2023-12-29

## 2025-03-24 PROBLEM — I48.92 ATRIAL FLUTTER (HCC): Status: ACTIVE | Noted: 2024-01-05

## 2025-03-24 PROBLEM — N17.9 AKI (ACUTE KIDNEY INJURY): Status: ACTIVE | Noted: 2024-01-24

## 2025-03-25 ENCOUNTER — ANESTHESIA (OUTPATIENT)
Dept: ENDOSCOPY | Facility: HOSPITAL | Age: 75
End: 2025-03-25
Payer: COMMERCIAL

## 2025-03-25 ENCOUNTER — ANESTHESIA EVENT (OUTPATIENT)
Dept: ENDOSCOPY | Facility: HOSPITAL | Age: 75
End: 2025-03-25
Payer: COMMERCIAL

## 2025-03-25 ENCOUNTER — HOSPITAL ENCOUNTER (OUTPATIENT)
Facility: HOSPITAL | Age: 75
Setting detail: HOSPITAL OUTPATIENT SURGERY
Discharge: HOME | End: 2025-03-25
Attending: INTERNAL MEDICINE | Admitting: INTERNAL MEDICINE
Payer: COMMERCIAL

## 2025-03-25 VITALS
OXYGEN SATURATION: 99 % | BODY MASS INDEX: 23.16 KG/M2 | HEART RATE: 55 BPM | WEIGHT: 171 LBS | HEIGHT: 72 IN | DIASTOLIC BLOOD PRESSURE: 70 MMHG | RESPIRATION RATE: 13 BRPM | SYSTOLIC BLOOD PRESSURE: 170 MMHG

## 2025-03-25 DIAGNOSIS — D50.0 IRON DEFICIENCY ANEMIA DUE TO CHRONIC BLOOD LOSS: ICD-10-CM

## 2025-03-25 DIAGNOSIS — K25.0 ACUTE GASTRIC ULCER WITH HEMORRHAGE: ICD-10-CM

## 2025-03-25 PROBLEM — Z87.11 HISTORY OF GASTRIC ULCER: Status: ACTIVE | Noted: 2025-01-01

## 2025-03-25 PROBLEM — K57.90 DIVERTICULOSIS: Status: ACTIVE | Noted: 2025-03-25

## 2025-03-25 PROBLEM — K63.5 POLYP OF COLON: Status: ACTIVE | Noted: 2025-03-25

## 2025-03-25 PROBLEM — Z87.11 HISTORY OF GASTRIC ULCER: Status: ACTIVE | Noted: 2025-03-25

## 2025-03-25 PROBLEM — K57.90 DIVERTICULOSIS: Status: ACTIVE | Noted: 2025-01-01

## 2025-03-25 PROBLEM — K63.5 POLYP OF COLON: Status: ACTIVE | Noted: 2025-01-01

## 2025-03-25 PROBLEM — K31.A0 GASTRIC INTESTINAL METAPLASIA: Status: ACTIVE | Noted: 2025-01-01

## 2025-03-25 PROBLEM — K31.A0 GASTRIC INTESTINAL METAPLASIA: Status: ACTIVE | Noted: 2025-03-25

## 2025-03-25 PROCEDURE — 45385 COLONOSCOPY W/LESION REMOVAL: CPT | Performed by: INTERNAL MEDICINE

## 2025-03-25 PROCEDURE — 0DBN8ZX EXCISION OF SIGMOID COLON, VIA NATURAL OR ARTIFICIAL OPENING ENDOSCOPIC, DIAGNOSTIC: ICD-10-PCS | Performed by: INTERNAL MEDICINE

## 2025-03-25 PROCEDURE — 0DB78ZX EXCISION OF STOMACH, PYLORUS, VIA NATURAL OR ARTIFICIAL OPENING ENDOSCOPIC, DIAGNOSTIC: ICD-10-PCS | Performed by: INTERNAL MEDICINE

## 2025-03-25 PROCEDURE — 43239 EGD BIOPSY SINGLE/MULTIPLE: CPT | Performed by: INTERNAL MEDICINE

## 2025-03-25 PROCEDURE — 0DBP8ZX EXCISION OF RECTUM, VIA NATURAL OR ARTIFICIAL OPENING ENDOSCOPIC, DIAGNOSTIC: ICD-10-PCS | Performed by: INTERNAL MEDICINE

## 2025-03-25 DEVICE — REPLAY HEMOSTASIS CLIP, 11MM SPAN
Type: IMPLANTABLE DEVICE | Site: COLON | Status: FUNCTIONAL
Brand: REPLAY

## 2025-03-25 RX ORDER — SODIUM CHLORIDE, SODIUM LACTATE, POTASSIUM CHLORIDE, CALCIUM CHLORIDE 600; 310; 30; 20 MG/100ML; MG/100ML; MG/100ML; MG/100ML
INJECTION, SOLUTION INTRAVENOUS CONTINUOUS
Status: DISCONTINUED | OUTPATIENT
Start: 2025-03-25 | End: 2025-03-25

## 2025-03-25 RX ORDER — NALOXONE HYDROCHLORIDE 0.4 MG/ML
0.08 INJECTION, SOLUTION INTRAMUSCULAR; INTRAVENOUS; SUBCUTANEOUS ONCE AS NEEDED
Status: DISCONTINUED | OUTPATIENT
Start: 2025-03-25 | End: 2025-03-25

## 2025-03-25 RX ORDER — LIDOCAINE HYDROCHLORIDE 10 MG/ML
INJECTION, SOLUTION EPIDURAL; INFILTRATION; INTRACAUDAL; PERINEURAL AS NEEDED
Status: DISCONTINUED | OUTPATIENT
Start: 2025-03-25 | End: 2025-03-25 | Stop reason: SURG

## 2025-03-25 RX ORDER — SILODOSIN 8 MG/1
CAPSULE ORAL
COMMUNITY
Start: 2025-02-21

## 2025-03-25 RX ORDER — ESOMEPRAZOLE MAGNESIUM 40 MG/1
40 CAPSULE, DELAYED RELEASE ORAL
Qty: 90 CAPSULE | Refills: 3 | Status: SHIPPED | OUTPATIENT
Start: 2025-03-25

## 2025-03-25 RX ADMIN — LIDOCAINE HYDROCHLORIDE 20 MG: 10 INJECTION, SOLUTION EPIDURAL; INFILTRATION; INTRACAUDAL; PERINEURAL at 07:38:00

## 2025-03-25 RX ADMIN — SODIUM CHLORIDE, SODIUM LACTATE, POTASSIUM CHLORIDE, CALCIUM CHLORIDE: 600; 310; 30; 20 INJECTION, SOLUTION INTRAVENOUS at 08:35:00

## 2025-03-25 NOTE — H&P
History & Physical Examination    Patient Name: Ashley Matos  MRN: [id number]  CSN: [id number]  YOB: 1950    Diagnosis: hx gastric ulcer, gastric intestinal metaplasia, LINDA, US kidney concerning for liver mets      Prescriptions Prior to Admission[1]  Current Facility-Administered Medications   Medication Dose Route Frequency    lactated ringers infusion   Intravenous Continuous       Allergies: Allergies[2]    Past Medical History:    Essential hypertension    High blood pressure    History of stomach ulcers    Liver lesion    Visual impairment    SOMETIMES USES GLASSES AT WORK     Past Surgical History:   Procedure Laterality Date    Egd  08/29/2024    Dr. Payne; gastric ulcer     History reviewed. No pertinent family history.  Social History     Tobacco Use    Smoking status: Former     Types: Cigarettes     Passive exposure: Never    Smokeless tobacco: Never   Substance Use Topics    Alcohol use: Never       SYSTEM Check if Review is Normal Check if Physical Exam is Normal If not normal, please explain:   HEENT [X ] [ X]    NECK  [X ] [ X]    HEART [X ] [ X]    LUNGS [X ] [ X]    ABDOMEN [X ] [ X]    EXTREMITIES [X ] [ X]    OTHER        I have discussed the risks and benefits and alternatives of the procedure with the patient/family.  They understand and agree to proceed with plan of care.   I have reviewed the History and Physical done within the last 30 days.  Any changes noted above.    Racquel Payne MD                 [1]   Medications Prior to Admission   Medication Sig Dispense Refill Last Dose/Taking    amLODIPine 10 MG Oral Tab Take 1 tablet (10 mg total) by mouth daily. 90 tablet 0 3/23/2025    clobetasol 0.05 % External Cream Apply 1 Application topically 2 (two) times daily. Apply twice daily Monday-Friday. Take weekends off. Use on affected areas. Do not use on face, groin, or armpits. 60 g 3 Taking    clobetasol 0.05 % External Ointment Apply to the affected areas  twice daily Monday-Friday. Take weekends off. Advised to AVOID on face, under breasts, underarms and groin. 60 g 3 Taking    Misc Natural Products (PUMPKIN SEED OIL OR) Take 1 capsule by mouth daily.   3/23/2025    tamsulosin 0.4 MG Oral Cap Take 2 capsules (0.8 mg total) by mouth nightly. TAKE AT BEDTIME   3/23/2025    silodosin 8 MG Oral Cap  (Patient not taking: Reported on 3/25/2025)   Not Taking    [] polyethylene glycol, PEG 3350-KCl-NaBcb-NaCl-NaSulf, 236 g Oral Recon Soln Take 4,000 mL by mouth once for 1 dose. May substitute prescription with Golytely/Nulytely/Gavilyte and or generic equivalent, if needed. Take bowel preparation as provided by gastroenterology office, or visit our website at [email or web address] (Patient not taking: Reported on 3/24/2025) 4000 mL 0 Not Taking    triamcinolone 0.1 % External Cream Apply 1 Application topically as needed. (Patient not taking: Reported on 2025)       pantoprazole 40 MG Oral Tab EC Take 1 tablet (40 mg total) by mouth 2 (two) times daily. (Patient not taking: Reported on 3/24/2025)   Not Taking    triamcinolone 0.5 % External Cream Apply thin layer of the cream twice per day on affected area of the skin (Patient not taking: Reported on 2025) 15 g 1     hydrALAZINE 25 MG Oral Tab Take 1 tablet (25 mg total) by mouth 3 (three) times daily. (Patient not taking: Reported on 10/22/2024) 270 tablet 0     Esomeprazole Magnesium 40 MG Oral Capsule Delayed Release Take 1 capsule (40 mg total) by mouth 2 (two) times daily before meals. (Patient not taking: Reported on 3/24/2025) 60 capsule 3 Not Taking    Ferrous Sulfate (IRON) 325 (65 Fe) MG Oral Tab Take 1 tablet by mouth every other day. (Patient not taking: Reported on 3/24/2025) 90 tablet 3 Not Taking    metoprolol tartrate 25 MG Oral Tab Take 1 tablet (25 mg total) by mouth Q12H. (Patient not taking: Reported on 3/24/2025) 180 tablet 0 Not Taking    [2]   Allergies  Allergen Reactions    Other OTHER (SEE COMMENTS)     Was given a stomach medicine, does not know the name, and does not know the reaction

## 2025-03-25 NOTE — OPERATIVE REPORT
ESOPHAGOGASTRODUODENOSCOPY (EGD) & COLONOSCOPY REPORT    Ashley Matos     1950 Age 75 year old   PCP Marcie Delaney MD Endoscopist Racquel Payne MD       Date of procedure: 25    Procedure: EGD w/ biopsies & Colonoscopy w/ cold snare polypectomy, clip placement x2    Pre-operative diagnosis: hx gastric ulcer, gastric intestinal metaplasia, LINDA, US kidney c/f liver mets     Post-operative diagnosis: colon polyps, diverticulosis     Medications: MAC    Withdrawal time: 28 minutes    Complications: none    Procedure: Informed consent was obtained from the patient after the risks of the procedure were discussed, including but not limited to bleeding, perforation, aspiration, infection, or possibility of a missed lesion. We discussed the risks/benefits and alternatives to this procedure, as well as the planned sedation. EGD procedure: The patient was placed in the left lateral decubitus position and begun on continuous blood pressure pulse oximetry and EKG monitoring and this was maintained throughout the procedure. Once an adequate level of sedation was obtained a bite block was placed. Then the lubricated tip of the [id number] diagnostic video upper endoscope was inserted and advanced using direct visualization into the posterior pharynx and ultimately into the esophagus with  distal extent of the second portion of the duodenum.     Colonoscopy procedure: Once an adequate level of sedation was obtained a digital rectal exam was completed. Then the lubricated tip of the [id number] diagnostic video colonoscope was inserted and advanced with some difficulty to the cecum due to sigmoid looping using the CO2 insufflation technique. The cecum was identified by localizing the trifold, the appendix and the ileocecal valve. A routine second examination of the cecum/ascending colon was performed. Withdrawal was begun with thorough washing and careful examination of the colonic walls and  folds. Photodocumentation was obtained. The bowel prep was good. Views of the colon were good with washing. I then carefully withdrew the instrument from the patient who tolerated the procedure well.     Complications: None    EGD findings:      1. Esophagus: The squamocolumnar junction was noted at 44 cm and appeared regular. The diaphragmatic pinch was noted noted at 44 cm from the incisors. The esophageal mucosa appeared normal. There was no evidence of esophagitis, stricture or endoscopic evidence of Phillips's esophagus.  2. Stomach: The stomach distended normally. Normal rugal folds were seen. The pylorus was patent. Retroflexion revealed a normal fundus. The gastric mucosa appeared normal. The previously noted ulcer at the incisura was healed. Biopsies were taken with a cold forceps from the antrum (lesser curvature and greater curvature), incisura and body for histology (lesser curvature and greater curvature) for gastric mapping.   3. Duodenum: The duodenal mucosa appeared normal in the bulb and 2nd portion of the duodenum.     EGD Impression:  -Esophagus: Normal.   -Stomach: Normal. Ulcer no longer present. Biopsied for gastric mapping.   -Duodenum: Normal.     Colonoscopy findings:    1. Seven polyps noted as follows:      A. FOUR -- 5-8 mm polyps in the sigmoid colon; sessile morphology; cold snare polypectomy performed, polyps retrieved.      B. THREE -- 5-7 mm polyps in the rectum; sessile morphology; cold snare polypectomy performed, polyps retrieved. Two clips placed for hemostasis.   2. Diverticulosis: left-sided.  3. Ileocecal valve appeared normal. The examined portion of the terminal ileum appeared normal.   4. The colonic mucosa throughout the colon showed normal vascular pattern, without evidence of angioectasias or inflammation.   5. A retroflexed view of the rectum appeared normal.   6. TYLER: somewhat poor rectal tone, no masses palpated.     Colonoscopy Impression:  Seven sub-centimeter polyps  removed. Two clips placed.   Left-sided diverticulosis.   Colon was otherwise normal with glistening mucosa and intact vascular pattern throughout.    Recommend:  Await pathology. Could consider deferring repeat colonoscopy given age.   High fiber diet.  Monitor for blood in the stool. If having more than just tinge of blood, call office or go to the ER.  Avoid NSAIDs (motrin, ibuprofen, aleve, advil, naproxen, midol, naprosyn, excedrin) for 14 days.   Continue esomeprazole once daily.   Complete the CT scans ordered by your PCP.     >>>If tissue was obtained and you have not received your pathology results either by phone or letter within 2 weeks, please call our office at [phone].    Specimens: colon polyps, antrum LC, antrum GC, incisura, body LC, body GC    Blood loss: <1 ml

## 2025-03-25 NOTE — ANESTHESIA PREPROCEDURE EVALUATION
Anesthesia PreOp Note    HPI:     Ashley Matos is a 75 year old male who presents for preoperative consultation requested by: Racquel Payne MD    Date of Surgery: 3/25/2025    Procedure(s):  ESOPHAGOGASTRODUODENOSCOPY with biopsy  COLONOSCOPY  Indication: Acute gastric ulcer with hemorrhage/ Iron deficiency anemia due to chronic blood loss    Relevant Problems   No relevant active problems       NPO:  Last Liquid Consumption Date: 03/24/25  Last Liquid Consumption Time: 2000  Last Solid Consumption Date: 03/24/25  Last Solid Consumption Time: 0600  Last Liquid Consumption Date: 03/24/25          History Review:  Patient Active Problem List    Diagnosis Date Noted    Liver lesion 02/24/2025    Positive PARTHA (antinuclear antibody) 08/16/2024    Chronic pain of both knees 08/16/2024    Anemia 08/13/2024    Gastric ulcer with hemorrhage 02/21/2024    Benign prostatic hyperplasia without lower urinary tract symptoms 02/21/2024    Essential hypertension with goal blood pressure less than 130/80 02/21/2024    Gastroesophageal reflux disease without esophagitis 02/21/2024    Recurrent pain of right knee 02/21/2024    SETH (acute kidney injury) 01/24/2024    Atrial flutter (HCC) 01/05/2024    SBO (small bowel obstruction) (HCC) 12/29/2023       Past Medical History:    Essential hypertension    High blood pressure    History of stomach ulcers    Liver lesion    Visual impairment    SOMETIMES USES GLASSES AT WORK       Past Surgical History:   Procedure Laterality Date    Egd  08/29/2024    Dr. Payne; gastric ulcer       Prescriptions Prior to Admission[1]  Current Medications and Prescriptions Ordered in Epic[2]    Allergies[3]    History reviewed. No pertinent family history.  Social History     Socioeconomic History    Marital status:    Tobacco Use    Smoking status: Former     Types: Cigarettes     Passive exposure: Never    Smokeless tobacco: Never   Vaping Use    Vaping status: Never Used    Substance and Sexual Activity    Alcohol use: Never    Drug use: Never   Other Topics Concern    Grew up on a farm No    History of tanning Yes    Outdoor occupation No    Reaction to local anesthetic No    Pt has a pacemaker No    Pt has a defibrillator No       Available pre-op labs reviewed.  Lab Results   Component Value Date    WBC 6.5 01/21/2025    RBC 3.48 (L) 01/21/2025    HGB 9.2 (L) 01/21/2025    HCT 29.0 (L) 01/21/2025    MCV 83.3 01/21/2025    MCH 26.4 01/21/2025    MCHC 31.7 01/21/2025    RDW 14.8 01/21/2025    .0 01/21/2025     Lab Results   Component Value Date     01/21/2025    K 5.0 01/21/2025     (H) 01/21/2025    CO2 20.0 (L) 01/21/2025    BUN 34 (H) 01/21/2025    CREATSERUM 2.15 (H) 01/21/2025    GLU 94 01/21/2025    CA 9.1 01/21/2025          Vital Signs:  Body mass index is 23.19 kg/m².   height is 1.829 m (6') and weight is 77.6 kg (171 lb). His blood pressure is 180/79 (abnormal) and his pulse is 55. His respiration is 14 and oxygen saturation is 98%.   Vitals:    03/20/25 1255 03/24/25 0858 03/25/25 0704   BP:   (!) 180/79   Pulse:   55   Resp:   14   SpO2:   98%   Weight: 78 kg (171 lb 15.3 oz) 77.6 kg (171 lb)    Height: 1.829 m (6') 1.829 m (6')         Anesthesia Evaluation     Patient summary reviewed    Airway   Mallampati: II  TM distance: >3 FB  Neck ROM: full  Dental    (+) upper dentures and lower dentures    Pulmonary - negative ROS and normal exam   Cardiovascular - normal exam  Exercise tolerance: good  (+) hypertension poorly controlled    Rhythm: regular  Rate: normal  ROS comment: Did not take amlodipine this AM    Neuro/Psych - negative ROS     GI/Hepatic/Renal    (+) GERD well controlled, liver disease    Comments: Hydronephrosis and liver lesions noted on 2/2025 ultrasound    Endo/Other - negative ROS   Abdominal                  Anesthesia Plan:   ASA:  2  Plan:   MAC  Plan Comments: GA discussed as backup  Informed Consent Plan and Risks Discussed  With:  Patient      I have informed Ashley Matos and/or legal guardian or family member of the nature of the anesthetic plan, benefits, risks including possible dental damage if relevant, major complications, and any alternative forms of anesthetic management.   All of the patient's questions were answered to the best of my ability. The patient desires the anesthetic management as planned.  Brandyn Foster CRNA  3/25/2025 7:21 AM  Present on Admission:  **None**           [1]   Medications Prior to Admission   Medication Sig Dispense Refill Last Dose/Taking    amLODIPine 10 MG Oral Tab Take 1 tablet (10 mg total) by mouth daily. 90 tablet 0 3/23/2025    clobetasol 0.05 % External Cream Apply 1 Application topically 2 (two) times daily. Apply twice daily Monday-Friday. Take weekends off. Use on affected areas. Do not use on face, groin, or armpits. 60 g 3 Taking    clobetasol 0.05 % External Ointment Apply to the affected areas twice daily Monday-Friday. Take weekends off. Advised to AVOID on face, under breasts, underarms and groin. 60 g 3 Taking    Misc Natural Products (PUMPKIN SEED OIL OR) Take 1 capsule by mouth daily.   3/23/2025    tamsulosin 0.4 MG Oral Cap Take 2 capsules (0.8 mg total) by mouth nightly. TAKE AT BEDTIME   3/23/2025    silodosin 8 MG Oral Cap  (Patient not taking: Reported on 3/25/2025)   Not Taking    [] polyethylene glycol, PEG 3350-KCl-NaBcb-NaCl-NaSulf, 236 g Oral Recon Soln Take 4,000 mL by mouth once for 1 dose. May substitute prescription with Golytely/Nulytely/Gavilyte and or generic equivalent, if needed. Take bowel preparation as provided by gastroenterology office, or visit our website at [email or web address] (Patient not taking: Reported on 3/24/2025) 4000 mL 0 Not Taking    triamcinolone 0.1 % External Cream Apply 1 Application topically as needed. (Patient not taking: Reported on 2025)       pantoprazole 40 MG  Oral Tab EC Take 1 tablet (40 mg total) by mouth 2 (two) times daily. (Patient not taking: Reported on 3/24/2025)   Not Taking    triamcinolone 0.5 % External Cream Apply thin layer of the cream twice per day on affected area of the skin (Patient not taking: Reported on 1/27/2025) 15 g 1     hydrALAZINE 25 MG Oral Tab Take 1 tablet (25 mg total) by mouth 3 (three) times daily. (Patient not taking: Reported on 10/22/2024) 270 tablet 0     Esomeprazole Magnesium 40 MG Oral Capsule Delayed Release Take 1 capsule (40 mg total) by mouth 2 (two) times daily before meals. (Patient not taking: Reported on 3/24/2025) 60 capsule 3 Not Taking    Ferrous Sulfate (IRON) 325 (65 Fe) MG Oral Tab Take 1 tablet by mouth every other day. (Patient not taking: Reported on 3/24/2025) 90 tablet 3 Not Taking    metoprolol tartrate 25 MG Oral Tab Take 1 tablet (25 mg total) by mouth Q12H. (Patient not taking: Reported on 3/24/2025) 180 tablet 0 Not Taking   [2]   Current Facility-Administered Medications Ordered in Epic   Medication Dose Route Frequency Provider Last Rate Last Admin    lactated ringers infusion   Intravenous Continuous Racquel Payne MD 20 mL/hr at 03/25/25 0714 New Bag at 03/25/25 0714     No current [facility]-ordered outpatient medications on file.   [3]   Allergies  Allergen Reactions    Other OTHER (SEE COMMENTS)     Was given a stomach medicine, does not know the name, and does not know the reaction

## 2025-03-25 NOTE — DISCHARGE INSTRUCTIONS
Home Care Instructions for Colonoscopy and/or Gastroscopy with Sedation    Diet:  - Resume your regular diet as tolerated unless otherwise instructed.  - Start with light meals to minimize bloating.  - Do not drink alcohol today.    Medication:  - If you have questions about resuming your normal medications, please contact your Primary Care Physician.    Activities:  - Take it easy today. Do not return to work today.  - Do not drive today.  - Do not operate any machinery today (including kitchen equipment).  - Do not make any critical decisions or sign any paperwork.  - Do not exercise today.    Colonoscopy:  - You may notice some rectal \"spotting\" (a little blood on the toilet tissue) for a day or two after the exam. This is normal.  - If you experience any rectal bleeding (not spotting), persistent tenderness or sharp severe abdominal pains, oral temperature over 100 degrees Fahrenheit, light-headedness or dizziness, or any other problems, contact your doctor.    Gastroscopy:  - You may have a sore throat for 2-3 days following the exam. This is normal. Gargling with warm salt water (1/2 tsp salt to 1 glass warm water) or using throat lozenges will help.  - If you experience any sharp pain in your neck, abdomen or chest, vomiting of blood, oral temperature over 100 degrees Fahrenheit, light-headedness or dizziness, or any other problems, contact your doctor.    **If unable to reach your doctor, please go to the [facility] Emergency Room**    - Your referring physician will receive a full report of your examination.  - If you do not hear from your doctor's office within two weeks of your biopsy, please call them for your results.    You may be able to see your laboratory results in [employer] between 4 and 7 business days.  In some cases, your physician may not have viewed the results before they are released to [facility].  If you have questions regarding your results contact the physician who ordered the  test/exam by phone or via [employer] by choosing \"Ask a Medical Question.\"

## 2025-03-25 NOTE — ANESTHESIA POSTPROCEDURE EVALUATION
tPatient: Ashley Matos    Procedure Summary       Date: 03/25/25 Room / Location: [facility] ENDOSCOPY 01 / [facility] ENDOSCOPY    Anesthesia Start: 0735 Anesthesia Stop: 0839    Procedures:       ESOPHAGOGASTRODUODENOSCOPY with biopsy      COLONOSCOPY with polypectomy and clipping Diagnosis:       Acute gastric ulcer with hemorrhage      Iron deficiency anemia due to chronic blood loss      (diverticulosis Polyps with clipping Normal egd)    Surgeons: Racquel Payne MD Anesthesiologist: Brandyn Foster CRNA    Anesthesia Type: MAC ASA Status: 2            Anesthesia Type: MAC    Vitals Value Taken Time   /70 03/25/25 0839   Temp 36.3 03/25/25 0839   Pulse 51 03/25/25 0838   Resp 13 03/25/25 0838   SpO2 98 % 03/25/25 0838   Vitals shown include unfiled device data.    [facility] AN Post Evaluation:   Patient Evaluated in PACU  Patient Participation: complete - patient participated  Level of Consciousness: sleepy but conscious  Pain Score: 0  Pain Management: adequate  Airway Patency:patent  Dental exam unchanged from preop  Yes    Nausea/Vomiting: none  Cardiovascular Status: acceptable and blood pressure returned to baseline  Respiratory Status: acceptable and room air  Postoperative Hydration acceptable      Brandyn Foster CRNA  3/25/2025 8:39 AM

## 2025-03-28 NOTE — PROGRESS NOTES
Discussed results with pt. Could consider repeat EGD +/- colonoscopy  in 3 years pending medical comorbidities. No findings to explain ongoing LINDA or findings of possible liver mets on US kidney. Recommended he schedule the previously ordered  CT scans by  Paulette Vincent.     HALINA Gonzalez - no findings to explain liver lesions and no findings to explain LINDA. We could consider video capsule but I first want him to get the CT scans done because if he has a malignancy, that would explain the LINDA.

## 2025-04-04 RX ORDER — PANTOPRAZOLE SODIUM 40 MG/1
40 TABLET, DELAYED RELEASE ORAL 2 TIMES DAILY
Qty: 90 TABLET | Refills: 0 | Status: SHIPPED | OUTPATIENT
Start: 2025-04-04

## 2025-04-04 NOTE — TELEPHONE ENCOUNTER
Requested Prescriptions     Pending Prescriptions Disp Refills    PANTOPRAZOLE 40 MG Oral Tab EC [Pharmacy Med Name: Pantoprazole Sodium 40 MG Oral Tablet Delayed Release] 90 tablet 0     Sig: Take 1 tablet by mouth twice daily       LOV 10/22/2024  LR   3/25/2025 for esomeprazole    I called and spoke to at [place] at [facility] pharmacy, to clarify medication the patient is on and requesting.   They did receive the prescription sent for esomeprazole and it's stored on file however it is not covered by the insurance.     Requesting Pantoprazole, which is covered by insurance.

## 2025-05-13 RX ORDER — AMLODIPINE BESYLATE 10 MG/1
10 TABLET ORAL DAILY
Qty: 90 TABLET | Refills: 3 | Status: SHIPPED | OUTPATIENT
Start: 2025-05-13

## 2025-05-14 ENCOUNTER — HOSPITAL ENCOUNTER (INPATIENT)
Age: 75
LOS: 1 days | Discharge: HOME | DRG: 351 | End: 2025-05-15
Attending: EMERGENCY MEDICINE | Admitting: FAMILY MEDICINE

## 2025-05-14 ENCOUNTER — APPOINTMENT (OUTPATIENT)
Dept: GENERAL RADIOLOGY | Age: 75
DRG: 351 | End: 2025-05-14
Attending: EMERGENCY MEDICINE

## 2025-05-14 ENCOUNTER — ANESTHESIA EVENT (OUTPATIENT)
Dept: SURGERY | Age: 75
End: 2025-05-14

## 2025-05-14 ENCOUNTER — ANESTHESIA (OUTPATIENT)
Dept: SURGERY | Age: 75
End: 2025-05-14

## 2025-05-14 DIAGNOSIS — K40.30 INCARCERATED RIGHT INGUINAL HERNIA: Primary | ICD-10-CM

## 2025-05-14 PROBLEM — K21.9 GASTROESOPHAGEAL REFLUX DISEASE WITHOUT ESOPHAGITIS: Chronic | Status: ACTIVE | Noted: 2025-05-14

## 2025-05-14 PROBLEM — I10 ESSENTIAL HYPERTENSION, BENIGN: Chronic | Status: ACTIVE | Noted: 2025-05-14

## 2025-05-14 LAB
ANION GAP SERPL CALC-SCNC: 10 MMOL/L (ref 7–19)
ATRIAL RATE (BPM): 65
BASOPHILS # BLD: 0 K/MCL (ref 0–0.3)
BASOPHILS NFR BLD: 0 %
BUN SERPL-MCNC: 36 MG/DL (ref 6–20)
BUN/CREAT SERPL: 19 (ref 7–25)
CALCIUM SERPL-MCNC: 8.9 MG/DL (ref 8.4–10.2)
CHLORIDE SERPL-SCNC: 114 MMOL/L (ref 97–110)
CO2 SERPL-SCNC: 21 MMOL/L (ref 21–32)
CREAT SERPL-MCNC: 1.88 MG/DL (ref 0.67–1.17)
DEPRECATED RDW RBC: 43 FL (ref 39–50)
EGFRCR SERPLBLD CKD-EPI 2021: 37 ML/MIN/{1.73_M2}
EOSINOPHIL # BLD: 0.3 K/MCL (ref 0–0.5)
EOSINOPHIL NFR BLD: 4 %
ERYTHROCYTE [DISTWIDTH] IN BLOOD: 14.3 % (ref 11–15)
FASTING DURATION TIME PATIENT: ABNORMAL H
GLUCOSE SERPL-MCNC: 106 MG/DL (ref 70–99)
HCT VFR BLD CALC: 29 % (ref 39–51)
HGB BLD-MCNC: 9 G/DL (ref 13–17)
IMM GRANULOCYTES # BLD AUTO: 0 K/MCL (ref 0–0.2)
IMM GRANULOCYTES # BLD: 0 %
LYMPHOCYTES # BLD: 1 K/MCL (ref 1–4)
LYMPHOCYTES NFR BLD: 14 %
MCH RBC QN AUTO: 25.6 PG (ref 26–34)
MCHC RBC AUTO-ENTMCNC: 31 G/DL (ref 32–36.5)
MCV RBC AUTO: 82.4 FL (ref 78–100)
MONOCYTES # BLD: 0.4 K/MCL (ref 0.3–0.9)
MONOCYTES NFR BLD: 6 %
NEUTROPHILS # BLD: 5.7 K/MCL (ref 1.8–7.7)
NEUTROPHILS NFR BLD: 76 %
NRBC BLD MANUAL-RTO: 0 /100 WBC
P AXIS (DEGREES): 49
PLATELET # BLD AUTO: 204 K/MCL (ref 140–450)
POTASSIUM SERPL-SCNC: 4.6 MMOL/L (ref 3.4–5.1)
PR-INTERVAL (MSEC): 180
QRS-INTERVAL (MSEC): 112
QT-INTERVAL (MSEC): 396
QTC: 412
R AXIS (DEGREES): 7
RAINBOW EXTRA TUBES HOLD SPECIMEN: NORMAL
RBC # BLD: 3.52 MIL/MCL (ref 4.5–5.9)
REPORT TEXT: NORMAL
SODIUM SERPL-SCNC: 140 MMOL/L (ref 135–145)
T AXIS (DEGREES): 59
VENTRICULAR RATE EKG/MIN (BPM): 65
WBC # BLD: 7.5 K/MCL (ref 4.2–11)

## 2025-05-14 PROCEDURE — 13000098 HB GENERAL ROBOTIC CASE S/U + 1ST 15 MIN

## 2025-05-14 PROCEDURE — 10006027 HB SUPPLY 278

## 2025-05-14 PROCEDURE — 10004452 HB PACU ADDL 30 MINUTES

## 2025-05-14 PROCEDURE — 85025 COMPLETE CBC W/AUTO DIFF WBC: CPT | Performed by: EMERGENCY MEDICINE

## 2025-05-14 PROCEDURE — 10004451 HB PACU RECOVERY 1ST 30 MINUTES

## 2025-05-14 PROCEDURE — 10002807 HB RX 258: Performed by: ANESTHESIOLOGY

## 2025-05-14 PROCEDURE — 10002800 HB RX 250 W HCPCS

## 2025-05-14 PROCEDURE — 93005 ELECTROCARDIOGRAM TRACING: CPT | Performed by: EMERGENCY MEDICINE

## 2025-05-14 PROCEDURE — 71045 X-RAY EXAM CHEST 1 VIEW: CPT

## 2025-05-14 PROCEDURE — 99223 1ST HOSP IP/OBS HIGH 75: CPT | Performed by: FAMILY MEDICINE

## 2025-05-14 PROCEDURE — 49650 LAP ING HERNIA REPAIR INIT: CPT

## 2025-05-14 PROCEDURE — 10002807 HB RX 258: Performed by: FAMILY MEDICINE

## 2025-05-14 PROCEDURE — 10006023 HB SUPPLY 272

## 2025-05-14 PROCEDURE — 13000002 HB ANESTHESIA  GENERAL   S/U + 1ST 15 MIN

## 2025-05-14 PROCEDURE — 10002800 HB RX 250 W HCPCS: Performed by: ANESTHESIOLOGY

## 2025-05-14 PROCEDURE — 99285 EMERGENCY DEPT VISIT HI MDM: CPT | Performed by: EMERGENCY MEDICINE

## 2025-05-14 PROCEDURE — 0YU54JZ SUPPLEMENT RIGHT INGUINAL REGION WITH SYNTHETIC SUBSTITUTE, PERCUTANEOUS ENDOSCOPIC APPROACH: ICD-10-PCS

## 2025-05-14 PROCEDURE — 8E0W4CZ ROBOTIC ASSISTED PROCEDURE OF TRUNK REGION, PERCUTANEOUS ENDOSCOPIC APPROACH: ICD-10-PCS

## 2025-05-14 PROCEDURE — 13000099 HB GENERAL ROBOTIC CASE EA ADD MINUTE

## 2025-05-14 PROCEDURE — 13000003 HB ANESTHESIA  GENERAL EA ADD MINUTE

## 2025-05-14 PROCEDURE — C1781 MESH (IMPLANTABLE): HCPCS

## 2025-05-14 PROCEDURE — 96374 THER/PROPH/DIAG INJ IV PUSH: CPT

## 2025-05-14 PROCEDURE — 93010 ELECTROCARDIOGRAM REPORT: CPT | Performed by: INTERNAL MEDICINE

## 2025-05-14 PROCEDURE — 80048 BASIC METABOLIC PNL TOTAL CA: CPT | Performed by: EMERGENCY MEDICINE

## 2025-05-14 PROCEDURE — 10002801 HB RX 250 W/O HCPCS: Performed by: ANESTHESIOLOGY

## 2025-05-14 PROCEDURE — 10002800 HB RX 250 W HCPCS: Performed by: EMERGENCY MEDICINE

## 2025-05-14 PROCEDURE — 10000002 HB ROOM CHARGE MED SURG

## 2025-05-14 DEVICE — 3DMAX MID ANATOMICAL MESH, 10 CM X 16 CM (4" X 6"), LARGE, RIGHT
Type: IMPLANTABLE DEVICE | Site: GROIN | Status: FUNCTIONAL
Brand: 3DMAX

## 2025-05-14 RX ORDER — IBUPROFEN 400 MG/1
400 TABLET, FILM COATED ORAL EVERY 6 HOURS PRN
Qty: 30 TABLET | Refills: 0 | Status: SHIPPED | OUTPATIENT
Start: 2025-05-14

## 2025-05-14 RX ORDER — ONDANSETRON 2 MG/ML
4 INJECTION INTRAMUSCULAR; INTRAVENOUS 2 TIMES DAILY PRN
Status: DISCONTINUED | OUTPATIENT
Start: 2025-05-14 | End: 2025-05-14 | Stop reason: HOSPADM

## 2025-05-14 RX ORDER — ACETAMINOPHEN 650 MG/1
650 SUPPOSITORY RECTAL EVERY 4 HOURS PRN
Status: DISCONTINUED | OUTPATIENT
Start: 2025-05-14 | End: 2025-05-15 | Stop reason: HOSPADM

## 2025-05-14 RX ORDER — PROPOFOL 10 MG/ML
INJECTION, EMULSION INTRAVENOUS PRN
Status: DISCONTINUED | OUTPATIENT
Start: 2025-05-14 | End: 2025-05-15

## 2025-05-14 RX ORDER — DROPERIDOL 2.5 MG/ML
0.62 INJECTION, SOLUTION INTRAMUSCULAR; INTRAVENOUS
Status: DISCONTINUED | OUTPATIENT
Start: 2025-05-14 | End: 2025-05-14 | Stop reason: HOSPADM

## 2025-05-14 RX ORDER — ONDANSETRON 2 MG/ML
4 INJECTION INTRAMUSCULAR; INTRAVENOUS EVERY 12 HOURS PRN
Status: DISCONTINUED | OUTPATIENT
Start: 2025-05-14 | End: 2025-05-15 | Stop reason: HOSPADM

## 2025-05-14 RX ORDER — PROCHLORPERAZINE EDISYLATE 5 MG/ML
5 INJECTION INTRAMUSCULAR; INTRAVENOUS EVERY 4 HOURS PRN
Status: DISCONTINUED | OUTPATIENT
Start: 2025-05-14 | End: 2025-05-14 | Stop reason: HOSPADM

## 2025-05-14 RX ORDER — DEXTROSE, SODIUM CHLORIDE, SODIUM LACTATE, POTASSIUM CHLORIDE, AND CALCIUM CHLORIDE 5; .6; .31; .03; .02 G/100ML; G/100ML; G/100ML; G/100ML; G/100ML
INJECTION, SOLUTION INTRAVENOUS CONTINUOUS
Status: DISCONTINUED | OUTPATIENT
Start: 2025-05-14 | End: 2025-05-15

## 2025-05-14 RX ORDER — ROCURONIUM BROMIDE 10 MG/ML
INJECTION, SOLUTION INTRAVENOUS PRN
Status: DISCONTINUED | OUTPATIENT
Start: 2025-05-14 | End: 2025-05-15

## 2025-05-14 RX ORDER — IBUPROFEN 200 MG
400 TABLET ORAL
Status: ACTIVE | OUTPATIENT
Start: 2025-05-14 | End: 2025-05-15

## 2025-05-14 RX ORDER — ACETAMINOPHEN 325 MG/1
650 TABLET ORAL
Status: ACTIVE | OUTPATIENT
Start: 2025-05-14 | End: 2025-05-15

## 2025-05-14 RX ORDER — NEOSTIGMINE METHYLSULFATE 1 MG/ML
INJECTION INTRAVENOUS PRN
Status: DISCONTINUED | OUTPATIENT
Start: 2025-05-14 | End: 2025-05-15

## 2025-05-14 RX ORDER — PANTOPRAZOLE SODIUM 40 MG/1
40 TABLET, DELAYED RELEASE ORAL 2 TIMES DAILY
Status: DISCONTINUED | OUTPATIENT
Start: 2025-05-15 | End: 2025-05-15 | Stop reason: HOSPADM

## 2025-05-14 RX ORDER — 0.9 % SODIUM CHLORIDE 0.9 %
2 VIAL (ML) INJECTION EVERY 12 HOURS SCHEDULED
Status: DISCONTINUED | OUTPATIENT
Start: 2025-05-14 | End: 2025-05-15 | Stop reason: HOSPADM

## 2025-05-14 RX ORDER — ONDANSETRON 4 MG/1
4 TABLET, ORALLY DISINTEGRATING ORAL EVERY 12 HOURS PRN
Status: DISCONTINUED | OUTPATIENT
Start: 2025-05-14 | End: 2025-05-15 | Stop reason: HOSPADM

## 2025-05-14 RX ORDER — SODIUM CHLORIDE, SODIUM LACTATE, POTASSIUM CHLORIDE, CALCIUM CHLORIDE 600; 310; 30; 20 MG/100ML; MG/100ML; MG/100ML; MG/100ML
INJECTION, SOLUTION INTRAVENOUS CONTINUOUS PRN
Status: DISCONTINUED | OUTPATIENT
Start: 2025-05-14 | End: 2025-05-15

## 2025-05-14 RX ORDER — OXYCODONE HYDROCHLORIDE 5 MG/1
5 TABLET ORAL EVERY 6 HOURS PRN
Qty: 15 TABLET | Refills: 0 | Status: SHIPPED | OUTPATIENT
Start: 2025-05-14 | End: 2025-05-19

## 2025-05-14 RX ORDER — ACETAMINOPHEN 500 MG
500 TABLET ORAL EVERY 4 HOURS PRN
Qty: 30 TABLET | Refills: 0 | Status: SHIPPED | OUTPATIENT
Start: 2025-05-14

## 2025-05-14 RX ORDER — DEXTROSE MONOHYDRATE 25 G/50ML
25 INJECTION, SOLUTION INTRAVENOUS PRN
Status: DISCONTINUED | OUTPATIENT
Start: 2025-05-14 | End: 2025-05-14 | Stop reason: HOSPADM

## 2025-05-14 RX ORDER — HYDROCODONE BITARTRATE AND ACETAMINOPHEN 5; 325 MG/1; MG/1
1 TABLET ORAL EVERY 4 HOURS PRN
Status: DISCONTINUED | OUTPATIENT
Start: 2025-05-14 | End: 2025-05-15 | Stop reason: HOSPADM

## 2025-05-14 RX ORDER — GLYCOPYRROLATE 0.2 MG/ML
INJECTION, SOLUTION INTRAMUSCULAR; INTRAVENOUS PRN
Status: DISCONTINUED | OUTPATIENT
Start: 2025-05-14 | End: 2025-05-15

## 2025-05-14 RX ORDER — NICOTINE POLACRILEX 4 MG
30 LOZENGE BUCCAL
Status: DISCONTINUED | OUTPATIENT
Start: 2025-05-14 | End: 2025-05-14 | Stop reason: HOSPADM

## 2025-05-14 RX ORDER — 0.9 % SODIUM CHLORIDE 0.9 %
10 VIAL (ML) INJECTION PRN
Status: DISCONTINUED | OUTPATIENT
Start: 2025-05-14 | End: 2025-05-15 | Stop reason: HOSPADM

## 2025-05-14 RX ORDER — TAMSULOSIN HYDROCHLORIDE 0.4 MG/1
0.8 CAPSULE ORAL NIGHTLY
Status: DISCONTINUED | OUTPATIENT
Start: 2025-05-15 | End: 2025-05-15 | Stop reason: HOSPADM

## 2025-05-14 RX ORDER — HYDRALAZINE HYDROCHLORIDE 20 MG/ML
5 INJECTION INTRAMUSCULAR; INTRAVENOUS EVERY 10 MIN PRN
Status: DISCONTINUED | OUTPATIENT
Start: 2025-05-14 | End: 2025-05-14 | Stop reason: HOSPADM

## 2025-05-14 RX ORDER — ALBUTEROL SULFATE 0.83 MG/ML
2.5 SOLUTION RESPIRATORY (INHALATION)
Status: DISCONTINUED | OUTPATIENT
Start: 2025-05-14 | End: 2025-05-14 | Stop reason: HOSPADM

## 2025-05-14 RX ORDER — BUPIVACAINE HYDROCHLORIDE 2.5 MG/ML
INJECTION, SOLUTION EPIDURAL; INFILTRATION; INTRACAUDAL; PERINEURAL PRN
Status: DISCONTINUED | OUTPATIENT
Start: 2025-05-14 | End: 2025-05-14 | Stop reason: HOSPADM

## 2025-05-14 RX ORDER — ONDANSETRON 2 MG/ML
INJECTION INTRAMUSCULAR; INTRAVENOUS PRN
Status: DISCONTINUED | OUTPATIENT
Start: 2025-05-14 | End: 2025-05-15

## 2025-05-14 RX ORDER — ACETAMINOPHEN 325 MG/1
650 TABLET ORAL EVERY 4 HOURS PRN
Status: DISCONTINUED | OUTPATIENT
Start: 2025-05-14 | End: 2025-05-15 | Stop reason: HOSPADM

## 2025-05-14 RX ADMIN — FENTANYL CITRATE 100 MCG: 50 INJECTION INTRAMUSCULAR; INTRAVENOUS at 20:43

## 2025-05-14 RX ADMIN — SODIUM CHLORIDE, SODIUM LACTATE, POTASSIUM CHLORIDE, CALCIUM CHLORIDE AND DEXTROSE MONOHYDRATE: 5; 600; 310; 30; 20 INJECTION, SOLUTION INTRAVENOUS at 16:20

## 2025-05-14 RX ADMIN — WATER 2000 MG: 1 INJECTION INTRAMUSCULAR; INTRAVENOUS; SUBCUTANEOUS at 19:20

## 2025-05-14 RX ADMIN — ROCURONIUM BROMIDE 50 MG: 10 INJECTION INTRAVENOUS at 18:57

## 2025-05-14 RX ADMIN — NEOSTIGMINE METHYLSULFATE 2 MG: 1 INJECTION INTRAVENOUS at 22:07

## 2025-05-14 RX ADMIN — GLYCOPYRROLATE 0.4 MG: 0.2 INJECTION, SOLUTION INTRAMUSCULAR; INTRAVENOUS at 22:07

## 2025-05-14 RX ADMIN — SODIUM CHLORIDE, POTASSIUM CHLORIDE, SODIUM LACTATE AND CALCIUM CHLORIDE: 600; 310; 30; 20 INJECTION, SOLUTION INTRAVENOUS at 18:52

## 2025-05-14 RX ADMIN — ROCURONIUM BROMIDE 20 MG: 10 INJECTION INTRAVENOUS at 21:08

## 2025-05-14 RX ADMIN — PROPOFOL 100 MG: 10 INJECTION, EMULSION INTRAVENOUS at 18:57

## 2025-05-14 RX ADMIN — FENTANYL CITRATE 25 MCG: 50 INJECTION INTRAMUSCULAR; INTRAVENOUS at 22:37

## 2025-05-14 RX ADMIN — ONDANSETRON 4 MG: 2 INJECTION INTRAMUSCULAR; INTRAVENOUS at 19:15

## 2025-05-14 RX ADMIN — FENTANYL CITRATE 25 MCG: 50 INJECTION INTRAMUSCULAR; INTRAVENOUS at 22:47

## 2025-05-14 RX ADMIN — FENTANYL CITRATE 25 MCG: 50 INJECTION INTRAMUSCULAR; INTRAVENOUS at 23:07

## 2025-05-14 RX ADMIN — MORPHINE SULFATE 4 MG: 4 INJECTION INTRAVENOUS at 12:57

## 2025-05-14 SDOH — SOCIAL STABILITY: SOCIAL NETWORK
HOW OFTEN DO YOU SEE OR TALK TO PEOPLE THAT YOU CARE ABOUT AND FEEL CLOSE TO? (FOR EXAMPLE: TALKING TO FRIENDS ON THE PHONE, VISITING FRIENDS OR FAMILY, GOING TO CHURCH OR CLUB MEETINGS): 3 TO 5 TIMES A WEEK

## 2025-05-14 SDOH — HEALTH STABILITY: GENERAL: BECAUSE OF A PHYSICAL, MENTAL, OR EMOTIONAL CONDITION, DO YOU HAVE DIFFICULTY DOING ERRANDS ALONE?: NO

## 2025-05-14 SDOH — ECONOMIC STABILITY: INCOME INSECURITY: IN THE PAST 12 MONTHS, HAS THE ELECTRIC, GAS, OIL, OR WATER COMPANY THREATENED TO SHUT OFF SERVICE IN YOUR HOME?: NO

## 2025-05-14 SDOH — ECONOMIC STABILITY: FOOD INSECURITY: WITHIN THE PAST 12 MONTHS, THE FOOD YOU BOUGHT JUST DIDN'T LAST AND YOU DIDN'T HAVE MONEY TO GET MORE.: NEVER TRUE

## 2025-05-14 SDOH — ECONOMIC STABILITY: HOUSING INSECURITY: WHAT IS YOUR LIVING SITUATION TODAY?: I HAVE A STEADY PLACE TO LIVE

## 2025-05-14 SDOH — ECONOMIC STABILITY: HOUSING INSECURITY: WHAT IS YOUR LIVING SITUATION TODAY?: HOUSE

## 2025-05-14 SDOH — HEALTH STABILITY: PHYSICAL HEALTH: DO YOU HAVE SERIOUS DIFFICULTY WALKING OR CLIMBING STAIRS?: NO

## 2025-05-14 SDOH — SOCIAL STABILITY: SOCIAL INSECURITY: HOW OFTEN DOES ANYONE, INCLUDING FAMILY AND FRIENDS, PHYSICALLY HURT YOU?: NEVER

## 2025-05-14 SDOH — SOCIAL STABILITY: SOCIAL INSECURITY: HOW OFTEN DOES ANYONE, INCLUDING FAMILY AND FRIENDS, THREATEN YOU WITH HARM?: NEVER

## 2025-05-14 SDOH — SOCIAL STABILITY: SOCIAL NETWORK

## 2025-05-14 SDOH — SOCIAL STABILITY: SOCIAL INSECURITY: HOW OFTEN DOES ANYONE, INCLUDING FAMILY AND FRIENDS, INSULT OR TALK DOWN TO YOU?: NEVER

## 2025-05-14 SDOH — SOCIAL STABILITY: SOCIAL INSECURITY: HOW OFTEN DOES ANYONE, INCLUDING FAMILY AND FRIENDS, SCREAM OR CURSE AT YOU?: NEVER

## 2025-05-14 SDOH — ECONOMIC STABILITY: GENERAL

## 2025-05-14 SDOH — ECONOMIC STABILITY: HOUSING INSECURITY: DO YOU HAVE PROBLEMS WITH ANY OF THE FOLLOWING?: PATIENT DECLINED

## 2025-05-14 SDOH — HEALTH STABILITY: PHYSICAL HEALTH: DO YOU HAVE DIFFICULTY DRESSING OR BATHING?: NO

## 2025-05-14 SDOH — ECONOMIC STABILITY: HOUSING INSECURITY: WHAT IS YOUR LIVING SITUATION TODAY?: SPOUSE

## 2025-05-14 ASSESSMENT — PATIENT HEALTH QUESTIONNAIRE - PHQ9
IS PATIENT ABLE TO COMPLETE PHQ2 OR PHQ9: YES
SUM OF ALL RESPONSES TO PHQ9 QUESTIONS 1 AND 2: 0
CLINICAL INTERPRETATION OF PHQ2 SCORE: NO FURTHER SCREENING NEEDED

## 2025-05-14 ASSESSMENT — ORIENTATION MEMORY CONCENTRATION TEST (OMCT)
WHAT TIME IS IT (NO WATCH OR CLOCK): CORRECT
OMCT SCORE: 0
COUNT BACKWARDS FROM 20 TO 1: CORRECT
OMCT INTERPRETATION: 0-6: NO SIGNIFICANT IMPAIRMENT
WHAT YEAR IS IT NOW (MUST BE EXACT): CORRECT
WHAT MONTH IS IT NOW: CORRECT
REPEAT THE NAME AND ADDRESS I ASKED YOU TO REMEMBER: CORRECT
SAY THE MONTHS IN REVERSE ORDER STARTING WITH LAST MONTH: CORRECT

## 2025-05-14 ASSESSMENT — PAIN SCALES - GENERAL
PAINLEVEL_OUTOF10: 8
PAINLEVEL_OUTOF10: 7
PAINLEVEL_OUTOF10: 6
PAINLEVEL_OUTOF10: 6
PAINLEVEL_OUTOF10: 3
PAINLEVEL_OUTOF10: 6
PAINLEVEL_OUTOF10: 8
PAINLEVEL_OUTOF10: 6

## 2025-05-14 ASSESSMENT — LIFESTYLE VARIABLES
AUDIT-C TOTAL SCORE: 0
HOW OFTEN DO YOU HAVE 6 OR MORE DRINKS ON ONE OCCASION: NEVER
HOW MANY STANDARD DRINKS CONTAINING ALCOHOL DO YOU HAVE ON A TYPICAL DAY: 0,1 OR 2
HOW OFTEN DO YOU HAVE A DRINK CONTAINING ALCOHOL: NEVER
ALCOHOL_USE_STATUS: NO OR LOW RISK WITH VALIDATED TOOL

## 2025-05-14 ASSESSMENT — ACTIVITIES OF DAILY LIVING (ADL)
RECENT_DECLINE_ADL: NO
ADL_BEFORE_ADMISSION: INDEPENDENT
ADL_SCORE: 12
ADL_SHORT_OF_BREATH: NO

## 2025-05-14 ASSESSMENT — PAIN DESCRIPTION - PAIN TYPE: TYPE: ACUTE PAIN

## 2025-05-15 VITALS
OXYGEN SATURATION: 98 % | WEIGHT: 189.6 LBS | BODY MASS INDEX: 25.68 KG/M2 | DIASTOLIC BLOOD PRESSURE: 67 MMHG | HEIGHT: 72 IN | SYSTOLIC BLOOD PRESSURE: 155 MMHG | HEART RATE: 55 BPM | RESPIRATION RATE: 18 BRPM | TEMPERATURE: 97.3 F

## 2025-05-15 LAB
ANION GAP SERPL CALC-SCNC: 9 MMOL/L (ref 7–19)
BASOPHILS # BLD: 0 K/MCL (ref 0–0.3)
BASOPHILS NFR BLD: 1 %
BUN SERPL-MCNC: 32 MG/DL (ref 6–20)
BUN/CREAT SERPL: 20 (ref 7–25)
CALCIUM SERPL-MCNC: 8.3 MG/DL (ref 8.4–10.2)
CHLORIDE SERPL-SCNC: 115 MMOL/L (ref 97–110)
CO2 SERPL-SCNC: 23 MMOL/L (ref 21–32)
CREAT SERPL-MCNC: 1.59 MG/DL (ref 0.67–1.17)
DEPRECATED RDW RBC: 44.6 FL (ref 39–50)
EGFRCR SERPLBLD CKD-EPI 2021: 45 ML/MIN/{1.73_M2}
EOSINOPHIL # BLD: 0.1 K/MCL (ref 0–0.5)
EOSINOPHIL NFR BLD: 2 %
ERYTHROCYTE [DISTWIDTH] IN BLOOD: 14.4 % (ref 11–15)
FASTING DURATION TIME PATIENT: ABNORMAL H
GLUCOSE BLDC GLUCOMTR-MCNC: 82 MG/DL (ref 70–99)
GLUCOSE SERPL-MCNC: 99 MG/DL (ref 70–99)
HCT VFR BLD CALC: 25.5 % (ref 39–51)
HGB BLD-MCNC: 7.8 G/DL (ref 13–17)
IMM GRANULOCYTES # BLD AUTO: 0 K/MCL (ref 0–0.2)
IMM GRANULOCYTES # BLD: 0 %
LYMPHOCYTES # BLD: 1 K/MCL (ref 1–4)
LYMPHOCYTES NFR BLD: 13 %
MCH RBC QN AUTO: 26.1 PG (ref 26–34)
MCHC RBC AUTO-ENTMCNC: 30.6 G/DL (ref 32–36.5)
MCV RBC AUTO: 85.3 FL (ref 78–100)
MONOCYTES # BLD: 0.7 K/MCL (ref 0.3–0.9)
MONOCYTES NFR BLD: 9 %
NEUTROPHILS # BLD: 5.6 K/MCL (ref 1.8–7.7)
NEUTROPHILS NFR BLD: 75 %
NRBC BLD MANUAL-RTO: 0 /100 WBC
PLATELET # BLD AUTO: 188 K/MCL (ref 140–450)
POTASSIUM SERPL-SCNC: 5.1 MMOL/L (ref 3.4–5.1)
RBC # BLD: 2.99 MIL/MCL (ref 4.5–5.9)
SODIUM SERPL-SCNC: 142 MMOL/L (ref 135–145)
WBC # BLD: 7.4 K/MCL (ref 4.2–11)

## 2025-05-15 PROCEDURE — 80048 BASIC METABOLIC PNL TOTAL CA: CPT | Performed by: FAMILY MEDICINE

## 2025-05-15 PROCEDURE — 99024 POSTOP FOLLOW-UP VISIT: CPT | Performed by: STUDENT IN AN ORGANIZED HEALTH CARE EDUCATION/TRAINING PROGRAM

## 2025-05-15 PROCEDURE — 36415 COLL VENOUS BLD VENIPUNCTURE: CPT | Performed by: FAMILY MEDICINE

## 2025-05-15 PROCEDURE — 10002803 HB RX 637

## 2025-05-15 PROCEDURE — 10004651 HB RX, NO CHARGE ITEM: Performed by: FAMILY MEDICINE

## 2025-05-15 PROCEDURE — 85025 COMPLETE CBC W/AUTO DIFF WBC: CPT | Performed by: FAMILY MEDICINE

## 2025-05-15 PROCEDURE — 99239 HOSP IP/OBS DSCHRG MGMT >30: CPT | Performed by: FAMILY MEDICINE

## 2025-05-15 PROCEDURE — 10002800 HB RX 250 W HCPCS

## 2025-05-15 PROCEDURE — 10002803 HB RX 637: Performed by: FAMILY MEDICINE

## 2025-05-15 RX ORDER — METOPROLOL TARTRATE 25 MG/1
25 TABLET, FILM COATED ORAL EVERY 12 HOURS SCHEDULED
Qty: 60 TABLET | Refills: 3 | Status: SHIPPED | OUTPATIENT
Start: 2025-05-15

## 2025-05-15 RX ORDER — ACETAMINOPHEN 500 MG
TABLET ORAL PRN
Status: ON HOLD | COMMUNITY
End: 2025-05-15 | Stop reason: HOSPADM

## 2025-05-15 RX ORDER — PANTOPRAZOLE SODIUM 40 MG/1
40 TABLET, DELAYED RELEASE ORAL ONCE
Status: COMPLETED | OUTPATIENT
Start: 2025-05-15 | End: 2025-05-15

## 2025-05-15 RX ORDER — PANTOPRAZOLE SODIUM 40 MG/1
40 TABLET, DELAYED RELEASE ORAL 2 TIMES DAILY
COMMUNITY

## 2025-05-15 RX ORDER — AMLODIPINE BESYLATE 10 MG/1
10 TABLET ORAL DAILY
COMMUNITY

## 2025-05-15 RX ORDER — ACETAMINOPHEN 325 MG/1
650 TABLET ORAL EVERY 6 HOURS
Status: DISCONTINUED | OUTPATIENT
Start: 2025-05-15 | End: 2025-05-15 | Stop reason: HOSPADM

## 2025-05-15 RX ORDER — KETOROLAC TROMETHAMINE 15 MG/ML
15 INJECTION, SOLUTION INTRAMUSCULAR; INTRAVENOUS EVERY 6 HOURS
Status: DISCONTINUED | OUTPATIENT
Start: 2025-05-15 | End: 2025-05-15 | Stop reason: HOSPADM

## 2025-05-15 RX ORDER — TRAMADOL HYDROCHLORIDE 50 MG/1
50 TABLET ORAL EVERY 6 HOURS PRN
Status: DISCONTINUED | OUTPATIENT
Start: 2025-05-15 | End: 2025-05-15 | Stop reason: HOSPADM

## 2025-05-15 RX ORDER — OMEPRAZOLE 40 MG/1
40 CAPSULE, DELAYED RELEASE ORAL 2 TIMES DAILY
Qty: 180 CAPSULE | Refills: 1 | Status: SHIPPED | OUTPATIENT
Start: 2025-05-15 | End: 2025-11-11

## 2025-05-15 RX ORDER — METOPROLOL TARTRATE 25 MG/1
25 TABLET, FILM COATED ORAL EVERY 12 HOURS SCHEDULED
Status: DISCONTINUED | OUTPATIENT
Start: 2025-05-15 | End: 2025-05-15 | Stop reason: HOSPADM

## 2025-05-15 RX ORDER — OXYCODONE HYDROCHLORIDE 5 MG/1
5 TABLET ORAL EVERY 4 HOURS PRN
Status: DISCONTINUED | OUTPATIENT
Start: 2025-05-15 | End: 2025-05-15 | Stop reason: HOSPADM

## 2025-05-15 RX ADMIN — HYDROMORPHONE HYDROCHLORIDE 0.5 MG: 1 INJECTION, SOLUTION INTRAMUSCULAR; INTRAVENOUS; SUBCUTANEOUS at 01:43

## 2025-05-15 RX ADMIN — ACETAMINOPHEN 650 MG: 325 TABLET ORAL at 08:52

## 2025-05-15 RX ADMIN — METOPROLOL TARTRATE 25 MG: 25 TABLET, FILM COATED ORAL at 11:31

## 2025-05-15 RX ADMIN — KETOROLAC TROMETHAMINE 15 MG: 15 INJECTION, SOLUTION INTRAMUSCULAR; INTRAVENOUS at 08:53

## 2025-05-15 RX ADMIN — SODIUM CHLORIDE, PRESERVATIVE FREE 2 ML: 5 INJECTION INTRAVENOUS at 08:45

## 2025-05-15 RX ADMIN — PANTOPRAZOLE SODIUM 40 MG: 40 TABLET, DELAYED RELEASE ORAL at 08:53

## 2025-05-15 RX ADMIN — HYDROMORPHONE HYDROCHLORIDE 0.5 MG: 1 INJECTION, SOLUTION INTRAMUSCULAR; INTRAVENOUS; SUBCUTANEOUS at 04:48

## 2025-05-15 ASSESSMENT — PAIN SCALES - GENERAL
PAINLEVEL_OUTOF10: 4
PAINLEVEL_OUTOF10: 6
PAINLEVEL_OUTOF10: 8
PAINLEVEL_OUTOF10: 2
PAINLEVEL_OUTOF10: 3
PAINLEVEL_OUTOF10: 8

## 2025-05-16 ENCOUNTER — TELEPHONE (OUTPATIENT)
Dept: INTERNAL MEDICINE CLINIC | Facility: CLINIC | Age: 75
End: 2025-05-16

## 2025-05-17 ENCOUNTER — TELEPHONE (OUTPATIENT)
Dept: CARE COORDINATION | Age: 75
End: 2025-05-17

## 2025-05-19 ENCOUNTER — NURSE TRIAGE (OUTPATIENT)
Dept: INTERNAL MEDICINE CLINIC | Facility: CLINIC | Age: 75
End: 2025-05-19

## 2025-05-19 NOTE — TELEPHONE ENCOUNTER
Pt's daughter stated Pt had Inguinal Hernia surgery last week,no abx given at discharge, pt doing ok but pt has testicle swollen , no Pain , not sure which testicle     Advised ER for Prompt eval if cannot see the surgeon     Sent to [facility]

## 2025-05-19 NOTE — TELEPHONE ENCOUNTER
Spoke to patient's daughter (on Release of Information), verified Name and . Relayed provider's message below. States she followed up with the surgeon's office and was told that antibiotic is not needed at this time, and that swelling is expected after surgery. Daughter will follow with surgeon's office as needed for any other surgery-related questions.    Also states that patient will need a form filled out for work. Daughter is out of state, unsure where the form is and will check with her cousin (dev) who is assisting the patient. Will call back once more information regarding the form is obtained Verbalized understanding and had no further questions at this time.

## 2025-05-21 ENCOUNTER — OFFICE VISIT (OUTPATIENT)
Dept: INTERNAL MEDICINE CLINIC | Facility: CLINIC | Age: 75
End: 2025-05-21

## 2025-05-21 ENCOUNTER — LAB ENCOUNTER (OUTPATIENT)
Dept: LAB | Age: 75
End: 2025-05-21
Attending: INTERNAL MEDICINE
Payer: COMMERCIAL

## 2025-05-21 VITALS
BODY MASS INDEX: 23.57 KG/M2 | HEIGHT: 72 IN | DIASTOLIC BLOOD PRESSURE: 63 MMHG | SYSTOLIC BLOOD PRESSURE: 147 MMHG | WEIGHT: 174 LBS | HEART RATE: 73 BPM

## 2025-05-21 DIAGNOSIS — D64.9 ANEMIA, UNSPECIFIED TYPE: ICD-10-CM

## 2025-05-21 DIAGNOSIS — N50.89 SWELLING OF RIGHT TESTICLE: ICD-10-CM

## 2025-05-21 DIAGNOSIS — K40.90 RIGHT INGUINAL HERNIA: ICD-10-CM

## 2025-05-21 DIAGNOSIS — D64.9 ANEMIA, UNSPECIFIED TYPE: Primary | ICD-10-CM

## 2025-05-21 LAB
BASOPHILS # BLD AUTO: 0.04 X10(3) UL (ref 0–0.2)
BASOPHILS NFR BLD AUTO: 0.6 %
DEPRECATED RDW RBC AUTO: 41.1 FL (ref 35.1–46.3)
EOSINOPHIL # BLD AUTO: 0.47 X10(3) UL (ref 0–0.7)
EOSINOPHIL NFR BLD AUTO: 7 %
ERYTHROCYTE [DISTWIDTH] IN BLOOD BY AUTOMATED COUNT: 13.8 % (ref 11–15)
HCT VFR BLD AUTO: 26.5 % (ref 39–53)
HGB BLD-MCNC: 8.4 G/DL (ref 13–17.5)
IMM GRANULOCYTES # BLD AUTO: 0.02 X10(3) UL (ref 0–1)
IMM GRANULOCYTES NFR BLD: 0.3 %
LYMPHOCYTES # BLD AUTO: 0.88 X10(3) UL (ref 1–4)
LYMPHOCYTES NFR BLD AUTO: 13.2 %
MCH RBC QN AUTO: 25.8 PG (ref 26–34)
MCHC RBC AUTO-ENTMCNC: 31.7 G/DL (ref 31–37)
MCV RBC AUTO: 81.3 FL (ref 80–100)
MONOCYTES # BLD AUTO: 0.46 X10(3) UL (ref 0.1–1)
MONOCYTES NFR BLD AUTO: 6.9 %
NEUTROPHILS # BLD AUTO: 4.82 X10 (3) UL (ref 1.5–7.7)
NEUTROPHILS # BLD AUTO: 4.82 X10(3) UL (ref 1.5–7.7)
NEUTROPHILS NFR BLD AUTO: 72 %
PLATELET # BLD AUTO: 248 10(3)UL (ref 150–450)
RBC # BLD AUTO: 3.26 X10(6)UL (ref 3.8–5.8)
WBC # BLD AUTO: 6.7 X10(3) UL (ref 4–11)

## 2025-05-21 PROCEDURE — 85025 COMPLETE CBC W/AUTO DIFF WBC: CPT

## 2025-05-21 PROCEDURE — 3008F BODY MASS INDEX DOCD: CPT | Performed by: INTERNAL MEDICINE

## 2025-05-21 PROCEDURE — 3077F SYST BP >= 140 MM HG: CPT | Performed by: INTERNAL MEDICINE

## 2025-05-21 PROCEDURE — 99215 OFFICE O/P EST HI 40 MIN: CPT | Performed by: INTERNAL MEDICINE

## 2025-05-21 PROCEDURE — 3078F DIAST BP <80 MM HG: CPT | Performed by: INTERNAL MEDICINE

## 2025-05-21 PROCEDURE — 36415 COLL VENOUS BLD VENIPUNCTURE: CPT

## 2025-05-21 RX ORDER — SULFAMETHOXAZOLE AND TRIMETHOPRIM 800; 160 MG/1; MG/1
1 TABLET ORAL 2 TIMES DAILY
Qty: 14 TABLET | Refills: 0 | Status: SHIPPED | OUTPATIENT
Start: 2025-05-21 | End: 2025-05-28

## 2025-05-21 NOTE — PROGRESS NOTES
Ashley Matos is a 75 year old male.  Chief Complaint   Patient presents with    ER F/U     HPI:      Patient presented today for hospital discharge follow up. Daughter present on the phone and translating.  He was admitted to the hospital on 5/14/2025 for abdominal pain and was found to have R incarcerated hernia and underwent hernia inguinal hernia repair. He states that after coming home he noticed on swelling in the R testicle area which has been getting bigger. It is very painful and uncomfortable. Denies any fever, has tenderness to touch. No swelling or pain on the left side. They called the surgical office and were told that this is an expected reaction from the surgery and it will improve. But patient's wife and and patient himself very worried that it is not improving and is getting more painful with redness. No abdominal pain    Him and his wife work at [employer] and will also need letter for each of them to be excused from work since. Patient's wife is his only caregiver and also driving him to doctor's appoinments.     Need off From 5/15 to 6/5th. Per family they asked the surgeon's office for this letter and were told that it should come from pcp.         Current Medications[1]   Past Medical History[2]   Past Surgical History[3]   Social History:  Short Social Hx on File[4]   Family History[5]   Allergies[6]     REVIEW OF SYSTEMS:   Review of Systems   Review of Systems   Constitutional: Negative for activity change, appetite change and fever.   HENT: Negative for congestion and voice change.    Respiratory: Negative for cough and shortness of breath.    Cardiovascular: Negative for chest pain.   Gastrointestinal: Negative for abdominal distention, abdominal pain and vomiting.   Genitourinary: Negative for hematuria.   Skin: Negative for wound.   Psychiatric/Behavioral: Negative for behavioral problems.   Wt Readings from Last 5 Encounters:   05/21/25 174 lb (78.9 kg)   03/24/25 171 lb (77.6 kg)    12/10/24 181 lb (82.1 kg)   11/12/24 180 lb (81.6 kg)   10/22/24 182 lb (82.6 kg)     Body mass index is 23.6 kg/m².      EXAM:   /63   Pulse 73   Ht 6' (1.829 m)   Wt 174 lb (78.9 kg)   BMI 23.60 kg/m²   Physical Exam   Constitutional:       Appearance: Normal appearance.   HENT:      Head: Normocephalic.   Eyes:      Conjunctiva/sclera: Conjunctivae normal.   Breast:  Normal bilateral breast exam. No palpable masses or nodules.   No nipples asymmetry or discharge. No skin changes   Cardiovascular:      Rate and Rhythm: Normal rate and regular rhythm.      Heart sounds: Normal heart sounds. No murmur heard.  Pulmonary:      Effort: Pulmonary effort is normal.      Breath sounds: Normal breath sounds. No rhonchi or rales.   Abdominal:      General: Bowel sounds are normal.      Palpations: Abdomen is soft.      Tenderness: There is no abdominal tenderness.   Musculoskeletal:      Cervical back: Neck supple.      Right lower leg: No edema.      Left lower leg: No edema.   Skin:     General: Skin is warm and dry.   Neurological:      General: No focal deficit present.      Mental Status: He is alert and oriented to person, place, and time. Mental status is at baseline.   Psychiatric:         Mood and Affect: Mood normal.         Behavior: Behavior normal.   Genital   R testicle with tenderness and swelling and redness, no warmth. L testicle normal size     ASSESSMENT AND PLAN:   Assessment & Plan     Tomor was seen today for er f/u.    Diagnoses and all orders for this visit:    Anemia, unspecified type  -     CBC W Differential W Platelet [E]; Future  - recheck labs and compare to hospital labs for hgb trend    Right inguinal hernia  - s/p repair  - incisions healing well  - tolerating diet  Swelling of right testicle  - likely from recent inguinal surgery  - has mild tenderness as well  - wear supportive undergarments  - will prescribe bactrim for possible underlying infection  - follow up with surgery.  Please notify urology office if no improvement or worsening symptoms.     Other orders  -     sulfamethoxazole-trimethoprim DS (BACTRIM DS) 800-160 MG Oral Tab per tablet; Take 1 tablet by mouth 2 (two) times daily for 7 days.     Excuse letter from work provided for both patient and spouse since she is the only caregiver for him       The patient indicates understanding of these issues and agrees to the plan.  Encounter Times  PreCharting:   minutes    Reviewing/Obtaining:  10 minutes      Medical Exam:   5minutes    Plan: 10  minutes      Notes: 5  minutes    Counseling/Education: 5  minutes      Referring/Communicating:   minutes    Ind Interpretation:   minutes      Care Coordination:  10 minutes       My total time spent caring for the patient on the day of the encounter: 45  minutes.          Anitha Griffin MD     This note was created by Dragon voice recognition. Errors in content may be related to improper recognition by the system; efforts to review and correct have been done but errors may still exist. Please be advised the primary purpose of this note is for me to communicate medical care. Standard sentence structure is not always used. Medical terminology and medical abbreviations may be used. There may be grammatical, typographical, and automated fill ins that may have errors missed in proofreading.          [1]   Current Outpatient Medications   Medication Sig Dispense Refill    sulfamethoxazole-trimethoprim DS (BACTRIM DS) 800-160 MG Oral Tab per tablet Take 1 tablet by mouth 2 (two) times daily for 7 days. 14 tablet 0    amLODIPine 10 MG Oral Tab Take 1 tablet (10 mg total) by mouth daily. 90 tablet 3    PANTOPRAZOLE 40 MG Oral Tab EC Take 1 tablet by mouth twice daily 90 tablet 0    clobetasol 0.05 % External Cream Apply 1 Application topically 2 (two) times daily. Apply twice daily Monday-Friday. Take weekends off. Use on affected areas. Do not use on face, groin, or armpits. 60 g 3     clobetasol 0.05 % External Ointment Apply to the affected areas twice daily Monday-Friday. Take weekends off. Advised to AVOID on face, under breasts, underarms and groin. 60 g 3    metoprolol tartrate 25 MG Oral Tab Take 1 tablet (25 mg total) by mouth Q12H. 180 tablet 0    Misc Natural Products (PUMPKIN SEED OIL OR) Take 1 capsule by mouth daily.      tamsulosin 0.4 MG Oral Cap Take 2 capsules (0.8 mg total) by mouth nightly. TAKE AT BEDTIME      silodosin 8 MG Oral Cap  (Patient not taking: Reported on 5/21/2025)      Esomeprazole Magnesium 40 MG Oral Capsule Delayed Release Take 1 capsule (40 mg total) by mouth every morning before breakfast. (Patient not taking: Reported on 5/21/2025) 90 capsule 3    triamcinolone 0.1 % External Cream Apply 1 Application topically as needed. (Patient not taking: Reported on 5/21/2025)      triamcinolone 0.5 % External Cream Apply thin layer of the cream twice per day on affected area of the skin (Patient not taking: Reported on 5/21/2025) 15 g 1    hydrALAZINE 25 MG Oral Tab Take 1 tablet (25 mg total) by mouth 3 (three) times daily. (Patient not taking: Reported on 5/21/2025) 270 tablet 0    Ferrous Sulfate (IRON) 325 (65 Fe) MG Oral Tab Take 1 tablet by mouth every other day. (Patient not taking: Reported on 5/21/2025) 90 tablet 3   [2]   Past Medical History:   Essential hypertension    High blood pressure    History of stomach ulcers    Liver lesion    Visual impairment    SOMETIMES USES GLASSES AT WORK   [3]   Past Surgical History:  Procedure Laterality Date    Colonoscopy N/A 3/25/2025    Procedure: COLONOSCOPY with polypectomy and clipping;  Surgeon: Racquel Payne MD;  Location: [facility] ENDOSCOPY    Egd  08/29/2024    Dr. Payne; gastric ulcer   [4]   Social History  Socioeconomic History    Marital status:    Tobacco Use    Smoking status: Former     Types: Cigarettes     Passive exposure: Never    Smokeless tobacco: Never   Vaping Use     Vaping status: Never Used   Substance and Sexual Activity    Alcohol use: Never    Drug use: Never   Other Topics Concern    Grew up on a farm No    History of tanning Yes    Outdoor occupation No    Reaction to local anesthetic No    Pt has a pacemaker No    Pt has a defibrillator No     Social Drivers of Health     Food Insecurity: Low Risk  (5/14/2025)    Received from [facility]    Food Insecurity     Within the past 12 months, you worried that your food would run out before you got money to buy more.  : Never true     Within the past 12 months, the food you bought just didn't last and you didn't have money to get more. : Never true   Transportation Needs: Not At Risk (5/14/2025)    Received from [facility]    Transportation Needs     In the past 12 months, has lack of reliable transportation kept you from medical appointments, meetings, work or from getting things needed for daily living? : No   [5] History reviewed. No pertinent family history.  [6]   Allergies  Allergen Reactions    Other OTHER (SEE COMMENTS)     Was given a stomach medicine, does not know the name, and does not know the reaction

## 2025-05-23 PROBLEM — Z09 S/P RIGHT INGUINAL HERNIA REPAIR, FOLLOW-UP EXAM: Status: ACTIVE | Noted: 2025-05-23

## 2025-05-24 ENCOUNTER — TELEPHONE (OUTPATIENT)
Dept: CARE COORDINATION | Age: 75
End: 2025-05-24

## 2025-05-27 ENCOUNTER — APPOINTMENT (OUTPATIENT)
Dept: SURGERY | Age: 75
End: 2025-05-27

## 2025-05-30 ENCOUNTER — OFFICE VISIT (OUTPATIENT)
Dept: SURGERY | Age: 75
End: 2025-05-30

## 2025-05-30 VITALS
OXYGEN SATURATION: 96 % | BODY MASS INDEX: 23.74 KG/M2 | HEART RATE: 65 BPM | HEIGHT: 72 IN | WEIGHT: 175.27 LBS | SYSTOLIC BLOOD PRESSURE: 159 MMHG | DIASTOLIC BLOOD PRESSURE: 63 MMHG

## 2025-05-30 DIAGNOSIS — K40.30 INCARCERATED RIGHT INGUINAL HERNIA: Primary | ICD-10-CM

## 2025-05-30 PROCEDURE — 99024 POSTOP FOLLOW-UP VISIT: CPT

## 2025-05-30 ASSESSMENT — PAIN SCALES - GENERAL: PAINLEVEL_OUTOF10: 0

## 2025-05-31 ENCOUNTER — TELEPHONE (OUTPATIENT)
Dept: CARE COORDINATION | Age: 75
End: 2025-05-31

## 2025-06-07 ENCOUNTER — TELEPHONE (OUTPATIENT)
Dept: CARE COORDINATION | Age: 75
End: 2025-06-07

## 2025-06-13 NOTE — LETTER
Problem: Discharge Planning  Goal: Discharge to home or other facility with appropriate resources  6/12/2025 2054 by Keiko Dill RN  Outcome: Progressing  6/12/2025 1655 by Wali Galeana RN  Outcome: Progressing     Problem: Seizure Precautions  Goal: Remains free of injury related to seizures activity  6/12/2025 2054 by Keiko Dill RN  Outcome: Progressing  6/12/2025 1655 by Wali Galeana RN  Outcome: Progressing     Problem: Safety - Adult  Goal: Free from fall injury  6/12/2025 2054 by Keiko Dill RN  Outcome: Progressing  6/12/2025 1655 by Wali Galeana RN  Outcome: Progressing     Problem: Pain  Goal: Verbalizes/displays adequate comfort level or baseline comfort level  6/12/2025 2054 by Keiko Dill RN  Outcome: Progressing  6/12/2025 1655 by Wali Galeana RN  Outcome: Progressing     Problem: ABCDS Injury Assessment  Goal: Absence of physical injury  6/12/2025 2054 by Keiko Dill RN  Outcome: Progressing  6/12/2025 1655 by Wali Galeana RN  Outcome: Progressing     Problem: Skin/Tissue Integrity  Goal: Skin integrity remains intact  6/12/2025 2054 by Keiko Dill RN  Outcome: Progressing  6/12/2025 1655 by Wali Galeana RN  Outcome: Progressing       [facility]  Administration of Anesthesia  IAshley agree to be cared for by a physician anesthesiologist alone and/or with a nurse anesthetist, who is specially trained to monitor me and give me medicine to put me to sleep or keep me comfortable during my procedure    I understand that my anesthesiologist and/or anesthetist is not an employee or agent of [facility] or [employer]. He or she works for [facility]    As the patient asking for anesthesia services, I agree to:  Allow the anesthesiologist (anesthesia doctor) to give me medicine and do additional procedures as necessary. Some examples are: Starting or using an “IV” to give me medicine, fluids or blood during my procedure, and having a breathing tube placed to help me breathe when I’m asleep (intubation). In the event that my heart stops working properly, I understand that my anesthesiologist will make every effort to sustain my life, unless otherwise directed by [facility] Do Not Resuscitate documents.  Tell my anesthesia doctor before my procedure:  If I am pregnant.  The last time that I ate or drank.  iii. All of the medicines I take (including prescriptions, herbal supplements, and pills I can buy without a prescription (including street drugs/illegal medications). Failure to inform my anesthesiologist about these medicines may increase my risk of anesthetic complications.  iv.If I am allergic to anything or have had a reaction to anesthesia before.  I understand how the anesthesia medicine will help me (benefits).  I understand that with any type of anesthesia medicine there are risks:  The most common risks are: nausea, vomiting, sore throat, muscle soreness, damage to my eyes, mouth, or teeth (from breathing tube placement).  Rare risks include: remembering what happened during my procedure, allergic reactions to medications, injury to my airway, heart, lungs, vision, nerves, or  muscles and in extremely rare instances death.  My doctor has explained to me other choices available to me for my care (alternatives).  Pregnant Patients (“epidural”):  I understand that the risks of having an epidural (medicine given into my back to help control pain during labor), include itching, low blood pressure, difficulty urinating, headache or slowing of the baby’s heart. Very rare risks include infection, bleeding, seizure, irregular heart rhythms and nerve injury.  Regional Anesthesia (“spinal”, “epidural”, & “nerve blocks”):  I understand that rare but potential complications include headache, bleeding, infection, seizure, irregular heart rhythms, and nerve injury.    _____________________________________________________________________________  Patient (or Representative) Signature/Relationship to Patient  Date   Time    _____________________________________________________________________________   Name (if used)    Language/Organization   Time    _____________________________________________________________________________  Nurse Anesthetist Signature     Date   Time  _____________________________________________________________________________  Anesthesiologist Signature     Date   Time  I have discussed the procedure and information above with the patient (or patient’s representative) and answered their questions. The patient or their representative has agreed to have anesthesia services.    _____________________________________________________________________________  Witness        Date   Time  I have verified that the signature is that of the patient or patient’s representative, and that it was signed before the procedure  Patient Name: Ashley Matos     : 1950                 Printed: 2024 at 9:13 AM    Medical Record #: [id number]                                            Page 1 of 1  ----------ANESTHESIA CONSENT----------

## 2025-06-14 ENCOUNTER — TELEPHONE (OUTPATIENT)
Dept: CARE COORDINATION | Age: 75
End: 2025-06-14

## 2025-06-16 ENCOUNTER — NURSE TRIAGE (OUTPATIENT)
Dept: INTERNAL MEDICINE CLINIC | Facility: CLINIC | Age: 75
End: 2025-06-16

## 2025-06-16 NOTE — TELEPHONE ENCOUNTER
Action Requested: Summary for Provider     []  Critical Lab, Recommendations Needed  [] Need Additional Advice  [x]   FYI    []   Need Orders  [] Need Medications Sent to Pharmacy  []  Other     SUMMARY: Per protocol, patient should be seen in the office within 3 days. Appointment scheduled.    Future Appointments   Date Time Provider Department Center   6/18/2025  4:40 PM Anitha Griffin MD [id number]  [place]      Reason for call: Hemoptysis  Onset: 2 Weeks Ago    Daughter states patient had hernia surgery last month. [facility] patient has been complaining of cough for the past 2 weeks. Concerned since patient has been seeing bloody phlegm whenever he coughs. [facility] patient used to be a heavy smoker, he quit one year ago. Denies shortness of breath or difficulty of breathing. No fever. Daughter requesting an appointment for the patient for followup. Care advice given per protocol. Verbalized understanding and agreed with plan of care.     Reason for Disposition   Cough has been present for > 3 weeks    Protocols used: Coughing Up Blood-A-OH

## 2025-06-18 ENCOUNTER — TELEPHONE (OUTPATIENT)
Dept: INTERNAL MEDICINE CLINIC | Facility: CLINIC | Age: 75
End: 2025-06-18

## 2025-06-18 ENCOUNTER — EKG ENCOUNTER (OUTPATIENT)
Dept: LAB | Age: 75
End: 2025-06-18
Attending: INTERNAL MEDICINE
Payer: COMMERCIAL

## 2025-06-18 ENCOUNTER — HOSPITAL ENCOUNTER (OUTPATIENT)
Dept: GENERAL RADIOLOGY | Age: 75
Discharge: HOME | End: 2025-06-18
Attending: INTERNAL MEDICINE
Payer: COMMERCIAL

## 2025-06-18 ENCOUNTER — LAB ENCOUNTER (OUTPATIENT)
Dept: LAB | Age: 75
End: 2025-06-18
Attending: INTERNAL MEDICINE
Payer: COMMERCIAL

## 2025-06-18 ENCOUNTER — OFFICE VISIT (OUTPATIENT)
Dept: INTERNAL MEDICINE CLINIC | Facility: CLINIC | Age: 75
End: 2025-06-18

## 2025-06-18 VITALS
BODY MASS INDEX: 23.98 KG/M2 | HEIGHT: 72 IN | HEART RATE: 58 BPM | SYSTOLIC BLOOD PRESSURE: 138 MMHG | WEIGHT: 177 LBS | DIASTOLIC BLOOD PRESSURE: 66 MMHG

## 2025-06-18 DIAGNOSIS — R04.2 HEMOPTYSIS: ICD-10-CM

## 2025-06-18 DIAGNOSIS — R07.9 CHEST PAIN, UNSPECIFIED TYPE: Primary | ICD-10-CM

## 2025-06-18 DIAGNOSIS — I10 ESSENTIAL HYPERTENSION WITH GOAL BLOOD PRESSURE LESS THAN 130/80: ICD-10-CM

## 2025-06-18 DIAGNOSIS — R05.1 ACUTE COUGH: ICD-10-CM

## 2025-06-18 DIAGNOSIS — R60.0 LEG EDEMA: ICD-10-CM

## 2025-06-18 DIAGNOSIS — R73.03 PREDIABETES: ICD-10-CM

## 2025-06-18 DIAGNOSIS — R07.9 CHEST PAIN, UNSPECIFIED TYPE: ICD-10-CM

## 2025-06-18 LAB
ALBUMIN SERPL-MCNC: 4.3 G/DL (ref 3.2–4.8)
ALBUMIN/GLOB SERPL: 1.5 {RATIO} (ref 1–2)
ALP LIVER SERPL-CCNC: 175 U/L (ref 45–117)
ALT SERPL-CCNC: 9 U/L (ref 10–49)
ANION GAP SERPL CALC-SCNC: 8 MMOL/L (ref 0–18)
AST SERPL-CCNC: 19 U/L (ref ?–34)
BILIRUB SERPL-MCNC: 0.4 MG/DL (ref 0.2–1.1)
BUN BLD-MCNC: 46 MG/DL (ref 9–23)
BUN/CREAT SERPL: 21.6 (ref 10–20)
CALCIUM BLD-MCNC: 9 MG/DL (ref 8.7–10.4)
CHLORIDE SERPL-SCNC: 112 MMOL/L (ref 98–112)
CHOLEST SERPL-MCNC: 169 MG/DL (ref ?–200)
CO2 SERPL-SCNC: 20 MMOL/L (ref 21–32)
CREAT BLD-MCNC: 2.13 MG/DL (ref 0.7–1.3)
EGFRCR SERPLBLD CKD-EPI 2021: 32 ML/MIN/1.73M2 (ref 60–?)
FASTING STATUS PATIENT QL REPORTED: NO
GLOBULIN PLAS-MCNC: 2.9 G/DL (ref 2–3.5)
GLUCOSE BLD-MCNC: 102 MG/DL (ref 70–99)
HDLC SERPL-MCNC: 35 MG/DL (ref 40–59)
LDLC SERPL CALC-MCNC: 113 MG/DL (ref ?–100)
NONHDLC SERPL-MCNC: 134 MG/DL (ref ?–130)
OSMOLALITY SERPL CALC.SUM OF ELEC: 302 MOSM/KG (ref 275–295)
POTASSIUM SERPL-SCNC: 5.3 MMOL/L (ref 3.5–5.1)
PROT SERPL-MCNC: 7.2 G/DL (ref 5.7–8.2)
SODIUM SERPL-SCNC: 140 MMOL/L (ref 136–145)
TRIGL SERPL-MCNC: 115 MG/DL (ref 30–149)
TROPONIN I SERPL HS-MCNC: 289 NG/L (ref ?–53)
VLDLC SERPL CALC-MCNC: 20 MG/DL (ref 0–30)

## 2025-06-18 PROCEDURE — 93010 ELECTROCARDIOGRAM REPORT: CPT | Performed by: INTERNAL MEDICINE

## 2025-06-18 PROCEDURE — 80053 COMPREHEN METABOLIC PANEL: CPT

## 2025-06-18 PROCEDURE — 80061 LIPID PANEL: CPT

## 2025-06-18 PROCEDURE — 36415 COLL VENOUS BLD VENIPUNCTURE: CPT

## 2025-06-18 PROCEDURE — 3008F BODY MASS INDEX DOCD: CPT | Performed by: INTERNAL MEDICINE

## 2025-06-18 PROCEDURE — 3078F DIAST BP <80 MM HG: CPT | Performed by: INTERNAL MEDICINE

## 2025-06-18 PROCEDURE — 93005 ELECTROCARDIOGRAM TRACING: CPT

## 2025-06-18 PROCEDURE — 84484 ASSAY OF TROPONIN QUANT: CPT

## 2025-06-18 PROCEDURE — 3075F SYST BP GE 130 - 139MM HG: CPT | Performed by: INTERNAL MEDICINE

## 2025-06-18 PROCEDURE — 71046 X-RAY EXAM CHEST 2 VIEWS: CPT | Performed by: INTERNAL MEDICINE

## 2025-06-18 PROCEDURE — 99215 OFFICE O/P EST HI 40 MIN: CPT | Performed by: INTERNAL MEDICINE

## 2025-06-18 RX ORDER — ACETAMINOPHEN 500 MG
500 TABLET ORAL
COMMUNITY
Start: 2025-05-14

## 2025-06-18 NOTE — TELEPHONE ENCOUNTER
Reviewed results  Troponin elevated  EKG borderline  K is high  Chest X ray with multifocal pneumonia  Called daughter and told her, he needs to go to ER right away. If not able to go himself than call 911. Daughter understands and will convince him. [place] and will call her parents now

## 2025-06-18 NOTE — PROGRESS NOTES
Ashley Matos is a 75 year old male.  Chief Complaint   Patient presents with    Acute     HPI:    Daughter present on the phone and translating.    He states for last 2 weeks since his surgery he has been having left sided chest pain on and off along with cough productive of sputum. Has some blood tinged sputum as well when he tries hard to expectorate. Also noticing bilateral lower ext edema, more on the L side. He has also noticed shortness of breath with activity. No fever, no dizziness. Scrotal swelling has resolved. All these symptoms are ongoing since last two weeks     Current Medications[1]   Past Medical History[2]   Past Surgical History[3]   Social History:  Short Social Hx on File[4]   Family History[5]   Allergies[6]     REVIEW OF SYSTEMS:   Review of Systems   Review of Systems   Constitutional: Negative for activity change, appetite change and fever.   HENT: Negative for congestion and voice change.    Respiratory: Negative for cough and shortness of breath.    Cardiovascular: Negative for chest pain.   Gastrointestinal: Negative for abdominal distention, abdominal pain and vomiting.   Genitourinary: Negative for hematuria.   Skin: Negative for wound.   Psychiatric/Behavioral: Negative for behavioral problems.   Wt Readings from Last 5 Encounters:   06/18/25 177 lb (80.3 kg)   05/21/25 174 lb (78.9 kg)   03/24/25 171 lb (77.6 kg)   12/10/24 181 lb (82.1 kg)   11/12/24 180 lb (81.6 kg)     Body mass index is 24.01 kg/m².      EXAM:   Ht 6' (1.829 m)   Wt 177 lb (80.3 kg)   BMI 24.01 kg/m²   Physical Exam   Constitutional:       Appearance: Normal appearance.   HENT:      Head: Normocephalic.   Eyes:      Conjunctiva/sclera: Conjunctivae normal.   Breast:  Normal bilateral breast exam. No palpable masses or nodules.   No nipples asymmetry or discharge. No skin changes   Cardiovascular:      Rate and Rhythm: Normal rate and regular rhythm.      Heart sounds: Normal heart sounds. No murmur  heard.  Pulmonary:      Effort: Pulmonary effort is normal.      Breath sounds: Normal breath sounds. No rhonchi or rales.   Abdominal:      General: Bowel sounds are normal.      Palpations: Abdomen is soft.      Tenderness: There is no abdominal tenderness.   Musculoskeletal:      Cervical back: Neck supple.      Right lower leg: No edema.      Left lower leg: No edema.      Bilateral lower ext edema pitting upto knees L more than R   Skin:     General: Skin is warm and dry.   Neurological:      General: No focal deficit present.      Mental Status: He is alert and oriented to person, place, and time. Mental status is at baseline.   Psychiatric:         Mood and Affect: Mood normal.         Behavior: Behavior normal.       ASSESSMENT AND PLAN:      Assessment & Plan  Chest pain, unspecified type  - check STAT EKG and labs  - will also need 2 D echo to work up these symptoms more  - patient sent to the lab for stat blood work since labs closing soon  Orders:    Comp Metabolic Panel (14); Future    EKG 12 Lead; Future    Troponin I (High Sensitivity) [E]; Future    Acute cough    Orders:    XR CHEST PA + LAT CHEST (CPT=71046); Future    Hemoptysis  - check CXR today  - could be traumatic from coughing. But he has h/o smoking for a long time. Will check CT chest as well  Orders:    CT CHEST (CPT=71250); Future    Leg edema  - likely post op  - check echo and STAT doppler   Orders:    US VENOUS DOPPLER LEG BILAT - DIAG IMG (CPT=93970); Future    Essential hypertension with goal blood pressure less than 130/80  - well controlled  - HR also ok        Prediabetes  - dietary modifications           The patient indicates understanding of these issues and agrees to the plan.    Anitha Griffin MD     This note was created by Dragon voice recognition. Errors in content may be related to improper recognition by the system; efforts to review and correct have been done but errors may still exist. Please be advised the  primary purpose of this note is for me to communicate medical care. Standard sentence structure is not always used. Medical terminology and medical abbreviations may be used. There may be grammatical, typographical, and automated fill ins that may have errors missed in proofreading.        [1]   Current Outpatient Medications   Medication Sig Dispense Refill    acetaminophen 500 MG Oral Tab Take 1 tablet (500 mg total) by mouth.      amLODIPine 10 MG Oral Tab Take 1 tablet (10 mg total) by mouth daily. 90 tablet 3    clobetasol 0.05 % External Cream Apply 1 Application topically 2 (two) times daily. Apply twice daily Monday-Friday. Take weekends off. Use on affected areas. Do not use on face, groin, or armpits. 60 g 3    clobetasol 0.05 % External Ointment Apply to the affected areas twice daily Monday-Friday. Take weekends off. Advised to AVOID on face, under breasts, underarms and groin. 60 g 3    metoprolol tartrate 25 MG Oral Tab Take 1 tablet (25 mg total) by mouth Q12H. 180 tablet 0    Misc Natural Products (PUMPKIN SEED OIL OR) Take 1 capsule by mouth daily.      tamsulosin 0.4 MG Oral Cap Take 2 capsules (0.8 mg total) by mouth nightly. TAKE AT BEDTIME      PANTOPRAZOLE 40 MG Oral Tab EC Take 1 tablet by mouth twice daily (Patient not taking: Reported on 6/18/2025) 90 tablet 0    silodosin 8 MG Oral Cap  (Patient not taking: Reported on 6/18/2025)      Esomeprazole Magnesium 40 MG Oral Capsule Delayed Release Take 1 capsule (40 mg total) by mouth every morning before breakfast. (Patient not taking: Reported on 6/18/2025) 90 capsule 3    triamcinolone 0.1 % External Cream Apply 1 Application topically as needed. (Patient not taking: Reported on 6/18/2025)      triamcinolone 0.5 % External Cream Apply thin layer of the cream twice per day on affected area of the skin (Patient not taking: Reported on 6/18/2025) 15 g 1    hydrALAZINE 25 MG Oral Tab Take 1 tablet (25 mg total) by mouth 3 (three) times daily.  (Patient not taking: Reported on 6/18/2025) 270 tablet 0    Ferrous Sulfate (IRON) 325 (65 Fe) MG Oral Tab Take 1 tablet by mouth every other day. (Patient not taking: Reported on 6/18/2025) 90 tablet 3   [2]   Past Medical History:   Essential hypertension    High blood pressure    History of stomach ulcers    Liver lesion    Visual impairment    SOMETIMES USES GLASSES AT WORK   [3]   Past Surgical History:  Procedure Laterality Date    Colonoscopy N/A 3/25/2025    Procedure: COLONOSCOPY with polypectomy and clipping;  Surgeon: Racquel Payne MD;  Location: [facility] ENDOSCOPY    Egd  08/29/2024    Dr. Payne; gastric ulcer   [4]   Social History  Socioeconomic History    Marital status:    Tobacco Use    Smoking status: Former     Types: Cigarettes     Passive exposure: Never    Smokeless tobacco: Never   Vaping Use    Vaping status: Never Used   Substance and Sexual Activity    Alcohol use: Never    Drug use: Never   Other Topics Concern    Grew up on a farm No    History of tanning Yes    Outdoor occupation No    Reaction to local anesthetic No    Pt has a pacemaker No    Pt has a defibrillator No     Social Drivers of Health     Food Insecurity: Low Risk  (5/14/2025)    Received from [facility]    Food Insecurity     Within the past 12 months, you worried that your food would run out before you got money to buy more.  : Never true     Within the past 12 months, the food you bought just didn't last and you didn't have money to get more. : Never true   Transportation Needs: Not At Risk (5/14/2025)    Received from [facility]    Transportation Needs     In the past 12 months, has lack of reliable transportation kept you from medical appointments, meetings, work or from getting things needed for daily living? : No   [5] History reviewed. No pertinent family history.  [6]   Allergies  Allergen Reactions    Other OTHER (SEE COMMENTS)     Was given a stomach medicine, does  not know the name, and does not know the reaction

## 2025-06-19 LAB
ATRIAL RATE: 55 BPM
P AXIS: 27 DEGREES
P-R INTERVAL: 172 MS
Q-T INTERVAL: 408 MS
QRS DURATION: 112 MS
QTC CALCULATION (BEZET): 390 MS
R AXIS: -22 DEGREES
T AXIS: 127 DEGREES
VENTRICULAR RATE: 55 BPM

## 2025-06-19 NOTE — TELEPHONE ENCOUNTER
Called daughter again today for lexus olvera  He was taken to [facility] and is admitted to the ICU

## 2025-06-27 ENCOUNTER — TELEPHONE (OUTPATIENT)
Dept: INTERNAL MEDICINE CLINIC | Facility: CLINIC | Age: 75
End: 2025-06-27

## 2025-06-27 ENCOUNTER — OFFICE VISIT (OUTPATIENT)
Dept: INTERNAL MEDICINE CLINIC | Facility: CLINIC | Age: 75
End: 2025-06-27

## 2025-06-27 VITALS
SYSTOLIC BLOOD PRESSURE: 154 MMHG | DIASTOLIC BLOOD PRESSURE: 72 MMHG | BODY MASS INDEX: 23.7 KG/M2 | WEIGHT: 175 LBS | HEIGHT: 72 IN | HEART RATE: 87 BPM

## 2025-06-27 DIAGNOSIS — N18.30 STAGE 3 CHRONIC KIDNEY DISEASE, UNSPECIFIED WHETHER STAGE 3A OR 3B CKD (HCC): ICD-10-CM

## 2025-06-27 DIAGNOSIS — I82.432 ACUTE DEEP VEIN THROMBOSIS (DVT) OF POPLITEAL VEIN OF LEFT LOWER EXTREMITY (HCC): Primary | ICD-10-CM

## 2025-06-27 DIAGNOSIS — R91.8 LUNG MASS: ICD-10-CM

## 2025-06-27 DIAGNOSIS — D64.9 ANEMIA, UNSPECIFIED TYPE: ICD-10-CM

## 2025-06-27 DIAGNOSIS — I10 ESSENTIAL HYPERTENSION WITH GOAL BLOOD PRESSURE LESS THAN 130/80: ICD-10-CM

## 2025-06-27 DIAGNOSIS — J18.9 PNEUMONIA OF BOTH LUNGS DUE TO INFECTIOUS ORGANISM, UNSPECIFIED PART OF LUNG: ICD-10-CM

## 2025-06-27 PROCEDURE — 3008F BODY MASS INDEX DOCD: CPT | Performed by: INTERNAL MEDICINE

## 2025-06-27 PROCEDURE — 3078F DIAST BP <80 MM HG: CPT | Performed by: INTERNAL MEDICINE

## 2025-06-27 PROCEDURE — 99214 OFFICE O/P EST MOD 30 MIN: CPT | Performed by: INTERNAL MEDICINE

## 2025-06-27 PROCEDURE — 3077F SYST BP >= 140 MM HG: CPT | Performed by: INTERNAL MEDICINE

## 2025-06-27 RX ORDER — GUAIFENESIN 200 MG/1
200 TABLET ORAL
COMMUNITY
Start: 2025-06-23 | End: 2025-07-03

## 2025-06-27 RX ORDER — ATORVASTATIN CALCIUM 40 MG/1
40 TABLET, FILM COATED ORAL NIGHTLY
COMMUNITY
Start: 2025-06-23 | End: 2025-07-23

## 2025-06-27 NOTE — ASSESSMENT & PLAN NOTE
S/p transfusion in the hospital   No active bleeding per patient   Will repeat cbc in a month   Orders:    CBC W Differential W Platelet [E]; Future

## 2025-06-27 NOTE — PROGRESS NOTES
Ashley Matos is a 75 year old male.  Chief Complaint   Patient presents with    Follow - Up     HPI:      Patient presented today for hospital follow up. Daughter translating over the phone. He was admitted to [facility] for chest pain. He was found to have Left popliteal vein DVT along with hemoglobin down to 6.9 and elevated troponin. Required blood transfusion. V/Q scan was performed but inconclusive because of pneumonia. PE was suspected. Patient has been started on Eliquis. CT chest with pulmonary nodules and possible malignancy. He has to follow up with pulm regarding further work up. Patient was treated with abt for pneumonia and discharged home with augmentin which he has completed.    He state that he feels very weak and tired. Shoulder pain has improved but still has some chest pain similar to the hospital. No shortness of breath. Cough and mucus production present but improved.     Current Medications[1]   Past Medical History[2]   Past Surgical History[3]   Social History:  Short Social Hx on File[4]   Family History[5]   Allergies[6]     REVIEW OF SYSTEMS:   Review of Systems   Review of Systems   Constitutional: Negative for activity change, appetite change and fever.   HENT: Negative for congestion and voice change.    Respiratory: Negative for cough and shortness of breath.    Cardiovascular: Negative for chest pain.   Gastrointestinal: Negative for abdominal distention, abdominal pain and vomiting.   Genitourinary: Negative for hematuria.   Skin: Negative for wound.   Psychiatric/Behavioral: Negative for behavioral problems.   Wt Readings from Last 5 Encounters:   06/27/25 175 lb (79.4 kg)   06/18/25 177 lb (80.3 kg)   05/21/25 174 lb (78.9 kg)   03/24/25 171 lb (77.6 kg)   12/10/24 181 lb (82.1 kg)     Body mass index is 23.73 kg/m².      EXAM:   /72   Pulse 87   Ht 6' (1.829 m)   Wt 175 lb (79.4 kg)   BMI 23.73 kg/m²   Physical Exam   Constitutional:       Appearance: Normal  appearance.   HENT:      Head: Normocephalic.   Eyes:      Conjunctiva/sclera: Conjunctivae normal.   Breast:  Normal bilateral breast exam. No palpable masses or nodules.   No nipples asymmetry or discharge. No skin changes   Cardiovascular:      Rate and Rhythm: Normal rate and regular rhythm.      Heart sounds: Normal heart sounds. No murmur heard.  Pulmonary:      Effort: Pulmonary effort is normal.      Breath sounds: Normal breath sounds. No rhonchi or rales.   Abdominal:      General: Bowel sounds are normal.      Palpations: Abdomen is soft.      Tenderness: There is no abdominal tenderness.   Musculoskeletal:      Cervical back: Neck supple.      Right lower leg: No edema.      Left lower leg: No edema.   Skin:     General: Skin is warm and dry.   Neurological:      General: No focal deficit present.      Mental Status: He is alert and oriented to person, place, and time. Mental status is at baseline.   Psychiatric:         Mood and Affect: Mood normal.         Behavior: Behavior normal.       ASSESSMENT AND PLAN:      Assessment & Plan  Acute deep vein thrombosis (DVT) of popliteal vein of left lower extremity (HCC)  - started on eliquis  Annelise serra triggered by recent post op status from hernia repair  - will refill eliquis  - patient will need to follow up with hematology for further treatment direction  Orders:    Oncology/Hematology Referral - In Network    Pneumonia of both lungs due to infectious organism, unspecified part of lung  Lung Mass  - s/p augmentin  - mucinex as needed  - follow up with pulm for further work up of lung mass. Patient currently does not want any further cancer work up but agrees to follow up once feeling better        Essential hypertension with goal blood pressure less than 130/80  - on metoprolol  - blood pressure better        Anemia, unspecified type  S/p transfusion in the hospital   No active bleeding per patient   Will repeat cbc in a month   Orders:    CBC W  Differential W Platelet [E]; Future    Stage 3 chronic kidney disease, unspecified whether stage 3a or 3b CKD (HCC)  - repeat labs in a month   Orders:    Basic Metabolic Panel (8) [E]; Future       The patient indicates understanding of these issues and agrees to the plan.  Follow up in 2 months     Anitha Griffin MD     This note was created by [employer] voice recognition. Errors in content may be related to improper recognition by the system; efforts to review and correct have been done but errors may still exist. Please be advised the primary purpose of this note is for me to communicate medical care. Standard sentence structure is not always used. Medical terminology and medical abbreviations may be used. There may be grammatical, typographical, and automated fill ins that may have errors missed in proofreading.        [1]   Current Outpatient Medications   Medication Sig Dispense Refill    apixaban 5 MG Oral Tab Take 1 tablet (5 mg total) by mouth.      atorvastatin 40 MG Oral Tab Take 1 tablet (40 mg total) by mouth nightly.      guaifenesin 200 MG Oral Tab Take 1 tablet (200 mg total) by mouth.      acetaminophen 500 MG Oral Tab Take 1 tablet (500 mg total) by mouth.      amLODIPine 10 MG Oral Tab Take 1 tablet (10 mg total) by mouth daily. 90 tablet 3    PANTOPRAZOLE 40 MG Oral Tab EC Take 1 tablet by mouth twice daily 90 tablet 0    clobetasol 0.05 % External Cream Apply 1 Application topically 2 (two) times daily. Apply twice daily Monday-Friday. Take weekends off. Use on affected areas. Do not use on face, groin, or armpits. 60 g 3    clobetasol 0.05 % External Ointment Apply to the affected areas twice daily Monday-Friday. Take weekends off. Advised to AVOID on face, under breasts, underarms and groin. 60 g 3    metoprolol tartrate 25 MG Oral Tab Take 1 tablet (25 mg total) by mouth Q12H. 180 tablet 0    Misc Natural Products (PUMPKIN SEED OIL OR) Take 1 capsule by mouth daily.      silodosin 8 MG  Oral Cap  (Patient not taking: Reported on 6/27/2025)      hydrALAZINE 25 MG Oral Tab Take 1 tablet (25 mg total) by mouth 3 (three) times daily. (Patient not taking: Reported on 6/27/2025) 270 tablet 0    Ferrous Sulfate (IRON) 325 (65 Fe) MG Oral Tab Take 1 tablet by mouth every other day. (Patient not taking: Reported on 6/27/2025) 90 tablet 3   [2]   Past Medical History:   Essential hypertension    High blood pressure    History of stomach ulcers    Liver lesion    Visual impairment    SOMETIMES USES GLASSES AT WORK   [3]   Past Surgical History:  Procedure Laterality Date    Colonoscopy N/A 3/25/2025    Procedure: COLONOSCOPY with polypectomy and clipping;  Surgeon: Racquel Payne MD;  Location: [facility] ENDOSCOPY    Egd  08/29/2024    Dr. Payne; gastric ulcer   [4]   Social History  Socioeconomic History    Marital status:    Tobacco Use    Smoking status: Former     Types: Cigarettes     Passive exposure: Never    Smokeless tobacco: Never   Vaping Use    Vaping status: Never Used   Substance and Sexual Activity    Alcohol use: Never    Drug use: Never   Other Topics Concern    Grew up on a farm No    History of tanning Yes    Outdoor occupation No    Reaction to local anesthetic No    Pt has a pacemaker No    Pt has a defibrillator No     Social Drivers of Health     Food Insecurity: Low Risk  (5/14/2025)    Received from [facility]    Food Insecurity     Within the past 12 months, you worried that your food would run out before you got money to buy more.  : Never true     Within the past 12 months, the food you bought just didn't last and you didn't have money to get more. : Never true   Transportation Needs: Not At Risk (6/19/2025)    Received from [facility] Transportation Needs     In the past 12 months, has lack of reliable transportation kept you from medical appointments, meetings, work or from getting things needed for daily living?: No   Housing  Stability: Not At Risk (6/19/2025)    Received from [facility]     What is your living situation today?: I have a steady place to live   [5] History reviewed. No pertinent family history.  [6]   Allergies  Allergen Reactions    Other OTHER (SEE COMMENTS)     Was given a stomach medicine, does not know the name, and does not know the reaction

## 2025-08-28 ENCOUNTER — TELEPHONE (OUTPATIENT)
Dept: INTERNAL MEDICINE CLINIC | Facility: CLINIC | Age: 75
End: 2025-08-28

## (undated) DEVICE — V2 SPECIMEN COLLECTION MANIFOLD KIT: Brand: NEPTUNE

## (undated) DEVICE — GLOVE SURG 6.5 PROTEXIS LF CRM PF BEAD CUFF STRL PLISPRN

## (undated) DEVICE — KIT MFLD FOR SPEC COLL

## (undated) DEVICE — Device

## (undated) DEVICE — YANKAUER,BULB TIP,W/O VENT,RIGID,STERILE: Brand: MEDLINE

## (undated) DEVICE — LASSO POLYPECTOMY SNARE: Brand: LASSO

## (undated) DEVICE — KIT ENDO ORCAPOD 160/180/190

## (undated) DEVICE — CONMED SCOPE SAVER BITE BLOCK, 20X27 MM: Brand: SCOPE SAVER

## (undated) DEVICE — TRAY CATH CMP CR LUBRI-SIL IC STLK SURESTEP 16FR FOLEY URMTR

## (undated) DEVICE — CO2 CANNULA,SSOFT,ADLT,7O2,4CO2,FEMALE: Brand: MEDLINE

## (undated) DEVICE — TIP ELECTROCAUTERY HOT SHR DA VINCI ENDOWRIST CAUT MNPLR 8

## (undated) DEVICE — DRIVER NDL L31.75 CM 40 D MEGA OD8 MM MEGA SUT CUT ENDOWRIST

## (undated) DEVICE — MEDI-VAC NON-CONDUCTIVE SUCTION TUBING: Brand: CARDINAL HEALTH

## (undated) DEVICE — COVER STAND W23 IN REINFORCE PLASTIC

## (undated) DEVICE — SYSTEM SMOKE EVAC UNV PLUME FILTRATION FLOW ADJ VLV LL LOCK

## (undated) DEVICE — BLADE SURG 11 UNFRM SHARPNESS STRL SS

## (undated) DEVICE — V2 SPECIMEN COLLECTION TRAY: Brand: NEPTUNE

## (undated) DEVICE — DRAPE EQUIPMENT CLMN DA VINCI XI

## (undated) DEVICE — MEDI-VAC NON-CONDUCTIVE SUCTION TUBING 6MM X 1.8M (6FT.) L: Brand: CARDINAL HEALTH

## (undated) DEVICE — SYSTEM IMG LAPAROVUE VUETIP PREFL DEFOG SOL RADOPQ TROCAR

## (undated) DEVICE — NEEDLE HPO 25GA 1.5IN REG WALL REG BVL LL HUB DEHP-FR STRL

## (undated) DEVICE — GLOVE SURG 7 PROTEXIS LF BLUE PF SMTH BEAD CUFF INTLK STRL

## (undated) DEVICE — GLOVE SURG 6 PROTEXIS PI CLASSIC PWDR FREE BEAD CUFF PLISPRN

## (undated) DEVICE — SYRINGE, LUER SLIP, STERILE, 60ML: Brand: MEDLINE

## (undated) DEVICE — GOWN SURG LG FABRIC ASTOUND BLUE LVL 3 NONREINFORCE SET IN

## (undated) DEVICE — GIJAW SINGLE-USE BIOPSY FORCEPS WITH NEEDLE: Brand: GIJAW

## (undated) DEVICE — SUTURE VCL+ MTPS 4-0 PS2 27IN BRAID COAT ABS

## (undated) DEVICE — SEAL CANNULA ID5-12 MM NONE

## (undated) DEVICE — SCISSORS LAPSCP 31.75CM 8MM DA VINCI XI HOT SHR EWRST 38D

## (undated) DEVICE — TUBING INSFL PNEUMOCLEAR SET HFL

## (undated) DEVICE — GLOVE SURG 6.5 PROTEXIS ESTM LF CRM PF SMTH BEAD CUFF INTLK

## (undated) DEVICE — GOWN SURG XL L3 NONREINFORCE SET IN SLV STRL LF DISP BLUE

## (undated) DEVICE — KIT SURG GEN ROBOTIC LF GSAM

## (undated) DEVICE — GLOVE SURG 6 PROTEXIS PI PWDR FREE SMTH BEAD CUFF INTLK

## (undated) DEVICE — SUTURE COAT VICRYL 2-0 CT-2 L27 IN BRAID COAT UNDYED ABS

## (undated) DEVICE — KIT CLEAN ENDOKIT 1.1OZ GOWNX2

## (undated) DEVICE — ELECTRODE PT RTN L15 FT VALLEYLAB REM POLYHESIVE ACRYLIC

## (undated) DEVICE — ADHESIVE SKIN CLSR DERMABOND ADVANCED .7 ML LIQUID APL

## (undated) DEVICE — DEVICE CLSR L6 IN 2-0 GS-22 V-LOC 180 ABS GRN

## (undated) DEVICE — SYRINGE 10 ML GRAD NONPYROGENIC DEHP FREE PVC FREE LOK MED

## (undated) DEVICE — NEEDLE INSFL L120 MM OD14 GA ENDOPATH SPRG LOAD BLUNT STY

## (undated) DEVICE — DRAPE EQUIPMENT ARM L21 IN X W19 IN X H10.5 IN DA VINCI XI

## (undated) DEVICE — 60 ML SYRINGE REGULAR TIP: Brand: MONOJECT

## (undated) NOTE — LETTER
8/9/2024          Ashley Matos        [id number]        [place] [address]         Dear Ashley,    This letter is to inform you that our office has made several attempts to reach you by phone without success.      Please contact our office at the number listed below as soon as you receive this letter to discuss this issue and to make the necessary changes in our system to your contact information.  Thank you for your cooperation.        Sincerely,    Marcie Delaney MD  [phone]        Document electronically generated by:  Verónica KHALIL RN

## (undated) NOTE — LETTER
[place] [address], [place], [place]    Authorization for Surgical Operation and Procedure                               I hereby authorize Racquel Payne MD, my physician and his/her assistants (if applicable), which may include medical students, residents, and/or fellows, to perform the following surgical operation/ procedure and administer such anesthesia as may be determined necessary by my physician: Operation/Procedure name (s) ESOPHAGOGASTRODUODENOSCOPY (EGD) on Ashley Matos   2.   I recognize that during the surgical operation/procedure, unforeseen conditions may necessitate additional or different procedures than those listed above.  I, therefore, further authorize and request that the above-named surgeon, assistants, or designees perform such procedures as are, in their judgment, necessary and desirable.    3.   My surgeon/physician has discussed prior to my surgery the potential benefits, risks and side effects of this procedure; the likelihood of achieving goals; and potential problems that might occur during recuperation.  They also discussed reasonable alternatives to the procedure, including risks, benefits, and side effects related to the alternatives and risks related to not receiving this procedure.  I have had all my questions answered and I acknowledge that no guarantee has been made as to the result that may be obtained.    4.   Should the need arise during my operation/procedure, which includes change of level of care prior to discharge, I also consent to the administration of blood and/or blood products.  Further, I understand that despite careful testing and screening of blood or blood products by collecting agencies, I may still be subject to ill effects as a result of receiving a blood transfusion and/or blood products.  The following are some, but not all, of the potential risks that can occur: fever and allergic reactions, hemolytic reactions,  transmission of diseases such as Hepatitis, AIDS and Cytomegalovirus (CMV) and fluid overload.  In the event that I wish to have an autologous transfusion of my own blood, or a directed donor transfusion, I will discuss this with my physician.  Check only if Refusing Blood or Blood Products  I understand refusal of blood or blood products as deemed necessary by my physician may have serious consequences to my condition to include possible death. I hereby assume responsibility for my refusal and release the hospital, its personnel, and my physicians from any responsibility for the consequences of my refusal.    o  Refuse   5.   I authorize the use of any specimen, organs, tissues, body parts or foreign objects that may be removed from my body during the operation/procedure for diagnosis, research or teaching purposes and their subsequent disposal by hospital authorities.  I also authorize the release of specimen test results and/or written reports to my treating physician on the hospital medical staff or other referring or consulting physicians involved in my care, at the discretion of the Pathologist or my treating physician.    6.   I consent to the photographing or videotaping of the operations or procedures to be performed, including appropriate portions of my body for medical, scientific, or educational purposes, provided my identity is not revealed by the pictures or by descriptive texts accompanying them.  If the procedure has been photographed/videotaped, the surgeon will obtain the original picture, image, videotape or CD.  The hospital will not be responsible for storage, release or maintenance of the picture, image, tape or CD.    7.   I consent to the presence of a  or observers in the operating room as deemed necessary by my physician or their designees.    8.   I recognize that in the event my procedure results in extended X-Ray/fluoroscopy time, I may develop a skin reaction.    9. If  I have a Do Not Attempt Resuscitation (DNAR) order in place, that status will be suspended while in the operating room, procedural suite, and during the recovery period unless otherwise explicitly stated by me (or a person authorized to consent on my behalf). The surgeon or my attending physician will determine when the applicable recovery period ends for purposes of reinstating the DNAR order.  10. Patients having a sterilization procedure: I understand that if the procedure is successful the results will be permanent and it will therefore be impossible for me to inseminate, conceive, or bear children.  I also understand that the procedure is intended to result in sterility, although the result has not been guaranteed.   11. I acknowledge that my physician has explained sedation/analgesia administration to me including the risk and benefits I consent to the administration of sedation/analgesia as may be necessary or desirable in the judgment of my physician.    I CERTIFY THAT I HAVE READ AND FULLY UNDERSTAND THE ABOVE CONSENT TO OPERATION and/or OTHER PROCEDURE.     ____________________________________  _________________________________        ______________________________  Signature of Patient    Signature of Responsible Person                Printed Name of Responsible Person                                      ____________________________________  _____________________________                ________________________________  Signature of Witness        Date  Time         Relationship to Patient    STATEMENT OF PHYSICIAN My signature below affirms that prior to the time of the procedure; I have explained to the patient and/or his/her legal representative, the risks and benefits involved in the proposed treatment and any reasonable alternative to the proposed treatment. I have also explained the risks and benefits involved in refusal of the proposed treatment and alternatives to the proposed treatment and have  answered the patient's questions. If I have a significant financial interest in a co-management agreement or a significant financial interest in any product or implant, or other significant relationship used in this procedure/surgery, I have disclosed this and had a discussion with my patient.     _____________________________________________________              _____________________________  (Signature of Physician)                                                                                         (Date)                                   (Time)  Patient Name: Ashley Matos      : 1950      Printed: 2024     Medical Record #: [id number]                                      Page 1 of 1

## (undated) NOTE — LETTER
5/21/2025          To Whom It May Concern:    Ashley Matos is currently under my medical care and may not return to work at this time.    Please excuse Ashley from 5/15/2025 to 6/5/2025.  He may return to work on 6/6/2025.  Activity is restricted as follows: none.    If you require additional information please contact our office.        Sincerely,     Anitha Griffin MD          Document generated by:  Anitha Griffin MD

## (undated) NOTE — LETTER
[facility]  Administration of Anesthesia  IAshley agree to be cared for by a physician anesthesiologist alone and/or with a nurse anesthetist, who is specially trained to monitor me and give me medicine to put me to sleep or keep me comfortable during my procedure    I understand that my anesthesiologist and/or anesthetist is not an employee or agent of [facility] or [facility] [employer]. He or she works for [facility]    As the patient asking for anesthesia services, I agree to:  Allow the anesthesiologist (anesthesia doctor) to give me medicine and do additional procedures as necessary. Some examples are: Starting or using an “IV” to give me medicine, fluids or blood during my procedure, and having a breathing tube placed to help me breathe when I’m asleep (intubation). In the event that my heart stops working properly, I understand that my anesthesiologist will make every effort to sustain my life, unless otherwise directed by [facility] Do Not Resuscitate documents.  Tell my anesthesia doctor before my procedure:  If I am pregnant.  The last time that I ate or drank.  iii. All of the medicines I take (including prescriptions, herbal supplements, and pills I can buy without a prescription (including street drugs/illegal medications). Failure to inform my anesthesiologist about these medicines may increase my risk of anesthetic complications.  iv.If I am allergic to anything or have had a reaction to anesthesia before.  I understand how the anesthesia medicine will help me (benefits).  I understand that with any type of anesthesia medicine there are risks:  The most common risks are: nausea, vomiting, sore throat, muscle soreness, damage to my eyes, mouth, or teeth (from breathing tube placement).  Rare risks include: remembering what happened during my procedure, allergic reactions to medications, injury to my airway, heart, lungs, vision, nerves, or  muscles and in extremely rare instances death.  My doctor has explained to me other choices available to me for my care (alternatives).  Pregnant Patients (“epidural”):  I understand that the risks of having an epidural (medicine given into my back to help control pain during labor), include itching, low blood pressure, difficulty urinating, headache or slowing of the baby’s heart. Very rare risks include infection, bleeding, seizure, irregular heart rhythms and nerve injury.  Regional Anesthesia (“spinal”, “epidural”, & “nerve blocks”):  I understand that rare but potential complications include headache, bleeding, infection, seizure, irregular heart rhythms, and nerve injury.    _____________________________________________________________________________  Patient (or Representative) Signature/Relationship to Patient  Date   Time    _____________________________________________________________________________   Name (if used)    Language/Organization   Time    _____________________________________________________________________________  Nurse Anesthetist Signature     Date   Time  _____________________________________________________________________________  Anesthesiologist Signature     Date   Time  I have discussed the procedure and information above with the patient (or patient’s representative) and answered their questions. The patient or their representative has agreed to have anesthesia services.    _____________________________________________________________________________  Witness        Date   Time  I have verified that the signature is that of the patient or patient’s representative, and that it was signed before the procedure  Patient Name: Ashley Matos     : 1950                 Printed: 3/20/2025 at 8:01 AM    Medical Record #: [id number]                                            Page 1 of 1  ----------ANESTHESIA CONSENT----------

## (undated) NOTE — LETTER
[facility] [address], [place], [place]    Authorization for Surgical Operation and Procedure                               I hereby authorize Racquel Payne MD, my physician and his/her assistants (if applicable), which may include medical students, residents, and/or fellows, to perform the following surgical operation/ procedure and administer such anesthesia as may be determined necessary by my physician: Operation/Procedure name (s) ESOPHAGOGASTRODUODENOSCOPY/ COLONOSCOPY on Ashley Matos   2.   I recognize that during the surgical operation/procedure, unforeseen conditions may necessitate additional or different procedures than those listed above.  I, therefore, further authorize and request that the above-named surgeon, assistants, or designees perform such procedures as are, in their judgment, necessary and desirable.    3.   My surgeon/physician has discussed prior to my surgery the potential benefits, risks and side effects of this procedure; the likelihood of achieving goals; and potential problems that might occur during recuperation.  They also discussed reasonable alternatives to the procedure, including risks, benefits, and side effects related to the alternatives and risks related to not receiving this procedure.  I have had all my questions answered and I acknowledge that no guarantee has been made as to the result that may be obtained.    4.   Should the need arise during my operation/procedure, which includes change of level of care prior to discharge, I also consent to the administration of blood and/or blood products.  Further, I understand that despite careful testing and screening of blood or blood products by collecting agencies, I may still be subject to ill effects as a result of receiving a blood transfusion and/or blood products.  The following are some, but not all, of the potential risks that can occur: fever and allergic reactions, hemolytic  reactions, transmission of diseases such as Hepatitis, AIDS and Cytomegalovirus (CMV) and fluid overload.  In the event that I wish to have an autologous transfusion of my own blood, or a directed donor transfusion, I will discuss this with my physician.  Check only if Refusing Blood or Blood Products  I understand refusal of blood or blood products as deemed necessary by my physician may have serious consequences to my condition to include possible death. I hereby assume responsibility for my refusal and release the hospital, its personnel, and my physicians from any responsibility for the consequences of my refusal.    o  Refuse   5.   I authorize the use of any specimen, organs, tissues, body parts or foreign objects that may be removed from my body during the operation/procedure for diagnosis, research or teaching purposes and their subsequent disposal by hospital authorities.  I also authorize the release of specimen test results and/or written reports to my treating physician on the hospital medical staff or other referring or consulting physicians involved in my care, at the discretion of the Pathologist or my treating physician.    6.   I consent to the photographing or videotaping of the operations or procedures to be performed, including appropriate portions of my body for medical, scientific, or educational purposes, provided my identity is not revealed by the pictures or by descriptive texts accompanying them.  If the procedure has been photographed/videotaped, the surgeon will obtain the original picture, image, videotape or CD.  The hospital will not be responsible for storage, release or maintenance of the picture, image, tape or CD.    7.   I consent to the presence of a  or observers in the operating room as deemed necessary by my physician or their designees.    8.   I recognize that in the event my procedure results in extended X-Ray/fluoroscopy time, I may develop a skin  reaction.    9. If I have a Do Not Attempt Resuscitation (DNAR) order in place, that status will be suspended while in the operating room, procedural suite, and during the recovery period unless otherwise explicitly stated by me (or a person authorized to consent on my behalf). The surgeon or my attending physician will determine when the applicable recovery period ends for purposes of reinstating the DNAR order.  10. Patients having a sterilization procedure: I understand that if the procedure is successful the results will be permanent and it will therefore be impossible for me to inseminate, conceive, or bear children.  I also understand that the procedure is intended to result in sterility, although the result has not been guaranteed.   11. I acknowledge that my physician has explained sedation/analgesia administration to me including the risk and benefits I consent to the administration of sedation/analgesia as may be necessary or desirable in the judgment of my physician.    I CERTIFY THAT I HAVE READ AND FULLY UNDERSTAND THE ABOVE CONSENT TO OPERATION and/or OTHER PROCEDURE.     ____________________________________  _________________________________        ______________________________  Signature of Patient    Signature of Responsible Person                Printed Name of Responsible Person                                      ____________________________________  _____________________________                ________________________________  Signature of Witness        Date  Time         Relationship to Patient    STATEMENT OF PHYSICIAN My signature below affirms that prior to the time of the procedure; I have explained to the patient and/or his/her legal representative, the risks and benefits involved in the proposed treatment and any reasonable alternative to the proposed treatment. I have also explained the risks and benefits involved in refusal of the proposed treatment and alternatives to the proposed  treatment and have answered the patient's questions. If I have a significant financial interest in a co-management agreement or a significant financial interest in any product or implant, or other significant relationship used in this procedure/surgery, I have disclosed this and had a discussion with my patient.     _____________________________________________________              _____________________________  (Signature of Physician)                                                                                         (Date)                                   (Time)  Patient Name: Ashley Matos      : 1950      Printed: 3/20/2025     Medical Record #: [id number]                                      Page 1 of 1

## (undated) NOTE — LETTER
9/3/2024              Ashley Matos        [id number]        [place] [address]         Dear Ashley,     We look forward to seeing you at your next scheduled appointment.    Your Appointments      Tuesday October 22, 2024 10:00 AM  Follow Up Visit with Racquel Payne MD  [facility] [address] [address] [phone]           Sincerely,    Racquel Payne MD  [address]  [facility] [address]  Ph: [phone]  Fax: [phone]